# Patient Record
Sex: FEMALE | Race: BLACK OR AFRICAN AMERICAN | Employment: STUDENT | URBAN - NONMETROPOLITAN AREA
[De-identification: names, ages, dates, MRNs, and addresses within clinical notes are randomized per-mention and may not be internally consistent; named-entity substitution may affect disease eponyms.]

---
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
## 2021-01-15 ENCOUNTER — OFFICE VISIT (OUTPATIENT)
Dept: URGENT CARE | Age: 21
End: 2021-01-15
Payer: COMMERCIAL

## 2021-01-15 VITALS
BODY MASS INDEX: 23.97 KG/M2 | OXYGEN SATURATION: 100 % | TEMPERATURE: 99.4 F | RESPIRATION RATE: 20 BRPM | SYSTOLIC BLOOD PRESSURE: 126 MMHG | DIASTOLIC BLOOD PRESSURE: 82 MMHG | WEIGHT: 114.2 LBS | HEART RATE: 75 BPM | HEIGHT: 58 IN

## 2021-01-15 DIAGNOSIS — B37.31 VULVOVAGINAL CANDIDIASIS: Primary | ICD-10-CM

## 2021-01-15 DIAGNOSIS — Z11.3 SCREENING FOR STDS (SEXUALLY TRANSMITTED DISEASES): ICD-10-CM

## 2021-01-15 PROCEDURE — G8482 FLU IMMUNIZE ORDER/ADMIN: HCPCS | Performed by: NURSE PRACTITIONER

## 2021-01-15 PROCEDURE — G8420 CALC BMI NORM PARAMETERS: HCPCS | Performed by: NURSE PRACTITIONER

## 2021-01-15 PROCEDURE — 99213 OFFICE O/P EST LOW 20 MIN: CPT | Performed by: NURSE PRACTITIONER

## 2021-01-15 PROCEDURE — 1036F TOBACCO NON-USER: CPT | Performed by: NURSE PRACTITIONER

## 2021-01-15 PROCEDURE — G8427 DOCREV CUR MEDS BY ELIG CLIN: HCPCS | Performed by: NURSE PRACTITIONER

## 2021-01-15 RX ORDER — FLUCONAZOLE 100 MG/1
200 TABLET ORAL ONCE
Qty: 2 TABLET | Refills: 0 | Status: SHIPPED | OUTPATIENT
Start: 2021-01-15 | End: 2021-01-15

## 2021-01-15 ASSESSMENT — ENCOUNTER SYMPTOMS: ABDOMINAL PAIN: 0

## 2021-01-15 NOTE — PROGRESS NOTES
[facility]  [address] [address] [place] [address]  Dept: [phone]  Dept Fax: [phone]  Loc: [phone]    Kerry Wright is a 24 y.o. female who presents today for her medical conditions/complaintsas noted below. Kerry Wright is c/o of Vaginal Itching        HPI:     Vaginal Itching  The patient's primary symptoms include genital itching and vaginal discharge. The patient's pertinent negatives include no genital lesions, genital odor or pelvic pain. This is a new problem. The current episode started in the past 7 days. The problem occurs daily. The problem has been gradually worsening. The patient is experiencing no pain. Pertinent negatives include no abdominal pain, chills, dysuria, fever, frequency or urgency. The vaginal discharge was yellow, white and thick. Nothing aggravates the symptoms. She has tried nothing for the symptoms. She is sexually active. It is unknown whether or not her partner has an STD. Her past medical history is significant for vaginosis. History reviewed. No pertinent past medical history. Past Surgical History:   Procedure Laterality Date    EXTERNAL EAR SURGERY Left        Family History   Problem Relation Age of Onset    Other Mother         Irregular menses    Diabetes Maternal Grandmother     Cancer Maternal Grandfather        Social History     Tobacco Use    Smoking status: Never Smoker    Smokeless tobacco: Never Used   Substance Use Topics    Alcohol use: No     Alcohol/week: 0.0 standard drinks      Current Outpatient Medications   Medication Sig Dispense Refill    fluconazole (DIFLUCAN) 100 MG tablet Take 2 tablets by mouth once for 1 dose 2 tablet 0    norgestimate-ethinyl estradiol (ESTARYLLA) 0.25-35 MG-MCG per tablet TAKE 1 TABLET BY MOUTH EVERY DAY 28 tablet 11     No current facility-administered medications for this visit.       No Known Allergies    Health Maintenance   Topic Date Due    Hepatitis C screen  2001    Chlamydia screen  08/22/2019    DTaP/Tdap/Td vaccine (5 - Td) 02/26/2029    Hepatitis A vaccine  Completed    Hepatitis B vaccine  Completed    Hib vaccine  Completed    HPV vaccine  Completed    Varicella vaccine  Completed    Meningococcal (ACWY) vaccine  Completed    Flu vaccine  Completed    HIV screen  Completed    Pneumococcal 0-64 years Vaccine  Aged Out       Subjective:     Review of Systems   Constitutional: Negative for chills and fever. Gastrointestinal: Negative for abdominal pain. Genitourinary: Positive for vaginal discharge. Negative for dysuria, frequency, genital sores, pelvic pain, urgency, vaginal bleeding and vaginal pain. Psychiatric/Behavioral: Negative for behavioral problems. Objective:     Physical Exam  Vitals signs and nursing note reviewed. Constitutional:       General: She is not in acute distress. Appearance: Normal appearance. She is not ill-appearing or toxic-appearing. HENT:      Head: Normocephalic and atraumatic. Right Ear: External ear normal.      Left Ear: External ear normal.   Eyes:      Extraocular Movements: Extraocular movements intact. Conjunctiva/sclera: Conjunctivae normal.      Pupils: Pupils are equal, round, and reactive to light. Cardiovascular:      Rate and Rhythm: Normal rate. Pulmonary:      Effort: Pulmonary effort is normal.   Genitourinary:     Comments: deferred  Musculoskeletal:         General: No swelling or signs of injury. Skin:     General: Skin is warm and dry. Findings: No rash. Neurological:      General: No focal deficit present. Mental Status: She is alert and oriented to person, place, and time. Motor: No weakness.    Psychiatric:         Mood and Affect: Mood normal.       /82   Pulse 75   Temp 99.4 °F (37.4 °C)   Resp 20   Ht (!) 4' 10\" (1.473 m)   Wt 114 lb 3.2 oz (51.8 kg)   LMP 01/02/2021   SpO2 100%   BMI 23.87 kg/m² Assessment:       Diagnosis Orders   1. Vulvovaginal candidiasis  fluconazole (DIFLUCAN) 100 MG tablet   2. Screening for STDs (sexually transmitted diseases)         Plan:    No orders of the defined types were placed in this encounter. Symptoms consistent with vulvovaginal candida will treat with Diflucan. Because patient has also had unprotected sex with partner will do [facility] urine panel. She VU. No follow-ups on file. Orders Placed This Encounter   Medications    fluconazole (DIFLUCAN) 100 MG tablet     Sig: Take 2 tablets by mouth once for 1 dose     Dispense:  2 tablet     Refill:  0       Patient given educational materials- see patient instructions. Discussed use, benefit, and side effects of prescribedmedications. All patient questions answered. Pt voiced understanding. Reviewedhealth maintenance. Instructed to continue current medications, diet and exercise. Patient agreed with treatment plan. Follow up as directed. Patient Instructions   Take Diflucan as prescribed. Urine sent out for panel. Will call with results. Refrain from sexual activity until results are discussed with you.         Electronically signed by BETH Moore CNP on 1/15/2021 at 4:27 PM

## 2021-01-15 NOTE — PATIENT INSTRUCTIONS
Take Diflucan as prescribed. Urine sent out for panel. Will call with results. Refrain from sexual activity until results are discussed with you.

## 2021-01-17 ENCOUNTER — TELEPHONE (OUTPATIENT)
Dept: URGENT CARE | Age: 21
End: 2021-01-17

## 2021-01-17 RX ORDER — METRONIDAZOLE 500 MG/1
500 TABLET ORAL 2 TIMES DAILY
Qty: 14 TABLET | Refills: 0 | Status: SHIPPED | OUTPATIENT
Start: 2021-01-17 | End: 2021-01-24

## 2021-01-17 NOTE — TELEPHONE ENCOUNTER
Please call pt and let her know she has BV and a yeast infection. She has been treated for the yeast. Script for BV tx sent to pharmacy.

## 2021-05-18 ENCOUNTER — OFFICE VISIT (OUTPATIENT)
Dept: URGENT CARE | Age: 21
End: 2021-05-18
Payer: COMMERCIAL

## 2021-05-18 VITALS
SYSTOLIC BLOOD PRESSURE: 138 MMHG | BODY MASS INDEX: 24.86 KG/M2 | WEIGHT: 118.4 LBS | RESPIRATION RATE: 18 BRPM | OXYGEN SATURATION: 100 % | TEMPERATURE: 98.1 F | DIASTOLIC BLOOD PRESSURE: 78 MMHG | HEIGHT: 58 IN | HEART RATE: 74 BPM

## 2021-05-18 DIAGNOSIS — R10.32 BILATERAL LOWER ABDOMINAL CRAMPING: ICD-10-CM

## 2021-05-18 DIAGNOSIS — R10.31 BILATERAL LOWER ABDOMINAL CRAMPING: ICD-10-CM

## 2021-05-18 DIAGNOSIS — Z11.3 SCREEN FOR STD (SEXUALLY TRANSMITTED DISEASE): Primary | ICD-10-CM

## 2021-05-18 LAB
BILIRUBIN, POC: NORMAL
BLOOD URINE, POC: NORMAL
CLARITY, POC: CLEAR
COLOR, POC: YELLOW
GLUCOSE URINE, POC: NORMAL
KETONES, POC: NORMAL
LEUKOCYTE EST, POC: NORMAL
NITRITE, POC: NORMAL
PH, POC: 7
PROTEIN, POC: NORMAL
SPECIFIC GRAVITY, POC: >=1030
UROBILINOGEN, POC: NORMAL

## 2021-05-18 PROCEDURE — 99213 OFFICE O/P EST LOW 20 MIN: CPT | Performed by: NURSE PRACTITIONER

## 2021-05-18 PROCEDURE — 1036F TOBACCO NON-USER: CPT | Performed by: NURSE PRACTITIONER

## 2021-05-18 PROCEDURE — G8427 DOCREV CUR MEDS BY ELIG CLIN: HCPCS | Performed by: NURSE PRACTITIONER

## 2021-05-18 PROCEDURE — 81002 URINALYSIS NONAUTO W/O SCOPE: CPT | Performed by: NURSE PRACTITIONER

## 2021-05-18 PROCEDURE — G8420 CALC BMI NORM PARAMETERS: HCPCS | Performed by: NURSE PRACTITIONER

## 2021-05-18 ASSESSMENT — VISUAL ACUITY: OU: 1

## 2021-05-18 ASSESSMENT — ENCOUNTER SYMPTOMS
DIARRHEA: 0
COUGH: 0
BACK PAIN: 0
NAUSEA: 0
ABDOMINAL PAIN: 1
VOMITING: 0

## 2021-05-18 NOTE — PATIENT INSTRUCTIONS
[facility] swab sent for STD, yeast- we will call with results in 2 days  Follow up with PCP or return to Urgent Care for worsening or unresolved symptoms. Patient Education        Exposure to Sexually Transmitted Infections: Care Instructions  Your Care Instructions  Sexually transmitted infections (STIs) are those diseases spread by sexual contact. There are at least 20 different STIs, including chlamydia, gonorrhea, syphilis, and human immunodeficiency virus (HIV), which causes AIDS. Bacteria-caused STIs can be treated and cured. STIs caused by viruses, such as HIV, can be treated but not cured. Some STIs can reduce a woman's chances of getting pregnant in the future. STIs are spread during sexual contact, such as vaginal intercourse and oral or anal sex. Follow-up care is a key part of your treatment and safety. Be sure to make and go to all appointments, and call your doctor if you are having problems. It's also a good idea to know your test results and keep a list of the medicines you take. How can you care for yourself at home? · Take medicines exactly as prescribed. · If your doctor prescribed antibiotics, take them as directed. Do not stop taking them just because you feel better. You need to take the full course of antibiotics. · Tell your sex partner (or partners) that they will need treatment. · If you are a woman, do not douche. Douching changes the normal balance of bacteria in the vagina. It may also spread an infection up into your reproductive organs. How can you prevent sexually transmitted infections (STIs)? You can help prevent STIs if you wait to have sex with a new partner (or partners) until you've each been tested for STIs. It also helps if you use condoms (male or female condoms) and if you limit your sex partners to one person who only has sex with you. When should you call for help?    Call your doctor now or seek immediate medical care if:    · You have new pain in your belly or pelvis.     · You have symptoms of a urinary tract infection. These may include:  ? Pain or burning when you urinate. ? A frequent need to urinate without being able to pass much urine. ? Pain in the flank, which is just below the rib cage and above the waist on either side of the back. ? Blood in your urine. ? A fever.     · You have new or worsening pain or swelling in the scrotum. Watch closely for changes in your health, and be sure to contact your doctor if:    · You have unusual vaginal bleeding.     · You have a discharge from the vagina or penis.     · You have any new symptoms, such as sores, bumps, rashes, blisters, or warts.     · You have itching, tingling, pain, or burning in the genital or anal area.     · You think you may have an STI. Where can you learn more? Go to [email or web address] and sign in to your Meijob account. Enter V815 in the Stratio box to learn more about \"Exposure to Sexually Transmitted Infections: Care Instructions. \"     If you do not have an account, please click on the \"Sign Up Now\" link. Current as of: February 26, 2020               Content Version: 12.8  © 2006-2021 Healthwise, Incorporated. Care instructions adapted under license by [facility] ([facility]). If you have questions about a medical condition or this instruction, always ask your healthcare professional. [address] [place] [address] any warranty or liability for your use of this information.

## 2021-05-18 NOTE — PROGRESS NOTES
[address] [facility]  [address] [address] [place] [address]  Dept: [phone]  Dept Fax: [phone]  Loc: [phone]    Oskar Ramos is a 24 y.o. female who presents today for her medical conditions/complaintsas noted below. Oskar Ramos is c/o of Other (patient states i feel fine today but the other day i had a weird cramp in my stomach and i nned to make sure i am okay. Patient states I have been sexually active but have not had any discharge or vaginal odor.)        HPI:     Other  Associated symptoms include abdominal pain. Pertinent negatives include no chills, coughing, fever, nausea, rash or vomiting. Marisela Freitas is here with complaint of lower abd cramping a couple of days ago but feels fine now. She has been sexually active and wants to be checked for STD. She denies pain, vaginal irritation or vaginal discharge. She has had no fever,chiils or back pain noted. She had yeast, BV and ureaplasma in January of this year and was treated in our office. History reviewed. No pertinent past medical history. Past Surgical History:   Procedure Laterality Date    EXTERNAL EAR SURGERY Left        Family History   Problem Relation Age of Onset    Other Mother         Irregular menses    Diabetes Maternal Grandmother     Cancer Maternal Grandfather        Social History     Tobacco Use    Smoking status: Never Smoker    Smokeless tobacco: Never Used   Substance Use Topics    Alcohol use: No     Alcohol/week: 0.0 standard drinks      Current Outpatient Medications   Medication Sig Dispense Refill    norgestimate-ethinyl estradiol (ESTARYLLA) 0.25-35 MG-MCG per tablet TAKE 1 TABLET BY MOUTH EVERY DAY 28 tablet 11     No current facility-administered medications for this visit.      No Known Allergies    Health Maintenance   Topic Date Due    Hepatitis C screen  Never done    COVID-19 Vaccine (1) Never done    Chlamydia screen  08/22/2019    DTaP/Tdap/Td vaccine (5 - Td) 02/26/2029    Hepatitis A vaccine  Completed    Hepatitis B vaccine  Completed    Hib vaccine  Completed    HPV vaccine  Completed    Varicella vaccine  Completed    Meningococcal (ACWY) vaccine  Completed    Flu vaccine  Completed    HIV screen  Completed    Pneumococcal 0-64 years Vaccine  Aged Out       Subjective:     Review of Systems   Constitutional: Negative for activity change, appetite change, chills and fever. Respiratory: Negative for cough. Cardiovascular: Negative. Gastrointestinal: Positive for abdominal pain. Negative for diarrhea, nausea and vomiting. Genitourinary: Negative for dysuria, pelvic pain, vaginal bleeding, vaginal discharge and vaginal pain. Musculoskeletal: Negative for back pain. Skin: Negative for rash. Neurological: Negative.        :Objective      Physical Exam  Vitals and nursing note reviewed. Constitutional:       General: She is awake. She is not in acute distress. Appearance: Normal appearance. She is well-developed and well-groomed. She is not ill-appearing. HENT:      Head: Normocephalic. Right Ear: Hearing normal.      Left Ear: Hearing normal.      Nose: Nose normal.      Mouth/Throat:      Lips: Pink. Eyes:      General: Vision grossly intact. Neck:      Trachea: Phonation normal.   Cardiovascular:      Rate and Rhythm: Normal rate and regular rhythm. Heart sounds: Normal heart sounds, S1 normal and S2 normal. No murmur heard. No friction rub. No gallop. Pulmonary:      Effort: Pulmonary effort is normal. No respiratory distress. Breath sounds: Normal breath sounds and air entry. No wheezing, rhonchi or rales. Abdominal:      General: Abdomen is flat. Bowel sounds are normal.      Palpations: Abdomen is soft. Tenderness: There is no abdominal tenderness. There is no right CVA tenderness or left CVA tenderness. Musculoskeletal:         General: No tenderness or deformity.  Normal range of motion. Cervical back: Neck supple. Skin:     General: Skin is warm and dry. Capillary Refill: Capillary refill takes less than 2 seconds. Neurological:      General: No focal deficit present. Mental Status: She is alert, oriented to person, place, and time and easily aroused. Psychiatric:         Attention and Perception: Attention normal.         Mood and Affect: Mood normal.         Speech: Speech normal.         Behavior: Behavior normal. Behavior is cooperative. /78   Pulse 74   Temp 98.1 °F (36.7 °C)   Resp 18   Ht (!) 4' 10\" (1.473 m)   Wt 118 lb 6.4 oz (53.7 kg)   LMP 04/30/2021   SpO2 100%   BMI 24.75 kg/m²     :Assessment       Diagnosis Orders   1. Screen for STD (sexually transmitted disease)     2. Bilateral lower abdominal cramping  POCT Urinalysis no Micro       :Plan      Orders Placed This Encounter   Procedures    POCT Urinalysis no Micro     Results for orders placed or performed in visit on 05/18/21   POCT Urinalysis no Micro   Result Value Ref Range    Color, UA yellow     Clarity, UA clear     Glucose, UA POC neg     Bilirubin, UA neg     Ketones, UA neg     Spec Grav, UA >=1,030     Blood, UA POC neg     pH, UA 7.0     Protein, UA POC neg     Urobilinogen, UA 0.2 E.U./dl     Leukocytes, UA neg     Nitrite, UA neg        No follow-ups on file. No orders of the defined types were placed in this encounter. Patient Instructions     [facility] swab sent for STD, yeast- we will call with results in 2 days  Follow up with PCP or return to Urgent Care for worsening or unresolved symptoms. Patient Education        Exposure to Sexually Transmitted Infections: Care Instructions  Your Care Instructions  Sexually transmitted infections (STIs) are those diseases spread by sexual contact. There are at least 20 different STIs, including chlamydia, gonorrhea, syphilis, and human immunodeficiency virus (HIV), which causes AIDS.  Bacteria-caused STIs can be · You have a discharge from the vagina or penis.     · You have any new symptoms, such as sores, bumps, rashes, blisters, or warts.     · You have itching, tingling, pain, or burning in the genital or anal area.     · You think you may have an STI. Where can you learn more? Go to [email or web address] and sign in to your DigiMeld account. Enter F195 in the GridX box to learn more about \"Exposure to Sexually Transmitted Infections: Care Instructions. \"     If you do not have an account, please click on the \"Sign Up Now\" link. Current as of: February 26, 2020               Content Version: 12.8  © 2006-2021 Healthwise, [email or web address]. Care instructions adapted under license by [facility] ([facility]). If you have questions about a medical condition or this instruction, always ask your healthcare professional. [address] [place] [address] any warranty or liability for your use of this information. Patient given educational materials- see patient instructions. Discussed use, benefit, and side effects of prescribedmedications. All patient questions answered. Pt voiced understanding.        Electronically signed by BETH uBi CNP on 5/18/2021 at 11:40 AM

## 2021-05-19 ENCOUNTER — TELEPHONE (OUTPATIENT)
Dept: URGENT CARE | Age: 21
End: 2021-05-19

## 2021-05-19 DIAGNOSIS — B96.89 BACTERIAL VAGINOSIS: Primary | ICD-10-CM

## 2021-05-19 DIAGNOSIS — N76.0 BACTERIAL VAGINOSIS: Primary | ICD-10-CM

## 2021-05-19 RX ORDER — METRONIDAZOLE 500 MG/1
500 TABLET ORAL 2 TIMES DAILY
Qty: 14 TABLET | Refills: 0 | Status: SHIPPED | OUTPATIENT
Start: 2021-05-19 | End: 2021-05-26

## 2021-05-19 RX ORDER — DOXYCYCLINE HYCLATE 100 MG
100 TABLET ORAL 2 TIMES DAILY
Qty: 20 TABLET | Refills: 0 | Status: SHIPPED | OUTPATIENT
Start: 2021-05-19 | End: 2021-05-29

## 2021-05-19 NOTE — TELEPHONE ENCOUNTER
Please call and let patient know that swab came back positive for bacterial vaginosis. Doxy and Flagyl sent to [place]. Take both medicines until completed; no sexual activity until meds complete.

## 2021-05-28 ENCOUNTER — OFFICE VISIT (OUTPATIENT)
Dept: OBGYN CLINIC | Age: 21
End: 2021-05-28
Payer: COMMERCIAL

## 2021-05-28 VITALS
SYSTOLIC BLOOD PRESSURE: 119 MMHG | BODY MASS INDEX: 23.51 KG/M2 | DIASTOLIC BLOOD PRESSURE: 81 MMHG | HEART RATE: 90 BPM | HEIGHT: 58 IN | WEIGHT: 112 LBS

## 2021-05-28 DIAGNOSIS — R10.2 PELVIC PAIN IN FEMALE: ICD-10-CM

## 2021-05-28 DIAGNOSIS — N76.0 BV (BACTERIAL VAGINOSIS): Primary | ICD-10-CM

## 2021-05-28 DIAGNOSIS — B96.89 BV (BACTERIAL VAGINOSIS): Primary | ICD-10-CM

## 2021-05-28 PROCEDURE — 1036F TOBACCO NON-USER: CPT | Performed by: OBSTETRICS & GYNECOLOGY

## 2021-05-28 PROCEDURE — 99202 OFFICE O/P NEW SF 15 MIN: CPT | Performed by: OBSTETRICS & GYNECOLOGY

## 2021-05-28 PROCEDURE — G8420 CALC BMI NORM PARAMETERS: HCPCS | Performed by: OBSTETRICS & GYNECOLOGY

## 2021-05-28 PROCEDURE — G8427 DOCREV CUR MEDS BY ELIG CLIN: HCPCS | Performed by: OBSTETRICS & GYNECOLOGY

## 2021-05-28 ASSESSMENT — ENCOUNTER SYMPTOMS
RESPIRATORY NEGATIVE: 1
GASTROINTESTINAL NEGATIVE: 1
EYES NEGATIVE: 1

## 2021-05-28 NOTE — PROGRESS NOTES
Pt is here today to establish care. She had a previous diagnosis of BV and is currently taking medication for it. She states it has gotten better. She also says, when she has intercourse she always gets BV, she isn't sure why this always keeps occurring.

## 2021-05-28 NOTE — PROGRESS NOTES
Jaguar Doyle is a 24 y.o. new patient who presents today for problems with BV. States she has been treated and it gets better, but as soon as she has intercourse, her symptoms reoccur. Pt has some pelvic pain with the infection. Pt is currently taking Doxycycline and boric acid suppositories. Pt wishes to discuss how to avoid this. Review of Systems   Constitutional: Negative. HENT: Negative. Eyes: Negative. Respiratory: Negative. Cardiovascular: Negative. Gastrointestinal: Negative. Endocrine: Negative. Genitourinary: Positive for pelvic pain. Negative for dysuria, frequency, menstrual problem, urgency and vaginal discharge. Musculoskeletal: Negative. Skin: Negative. Allergic/Immunologic: Negative. Neurological: Negative. Hematological: Negative. Psychiatric/Behavioral: Negative. History reviewed. No pertinent past medical history.     Past Surgical History:   Procedure Laterality Date    EXTERNAL EAR SURGERY Left        Family History   Problem Relation Age of Onset    Other Mother         Irregular menses    Diabetes Maternal Grandmother     Cancer Maternal Grandfather        Social History     Socioeconomic History    Marital status: Single     Spouse name: Not on file    Number of children: Not on file    Years of education: Not on file    Highest education level: Not on file   Occupational History    Not on file   Tobacco Use    Smoking status: Never Smoker    Smokeless tobacco: Never Used   Vaping Use    Vaping Use: Never used   Substance and Sexual Activity    Alcohol use: No     Alcohol/week: 0.0 standard drinks    Drug use: No    Sexual activity: Yes     Partners: Male   Other Topics Concern    Not on file   Social History Narrative    Not on file     Social Determinants of Health     Financial Resource Strain:     Difficulty of Paying Living Expenses:    Food Insecurity:     Worried About Running Out of Food in the Last Year:     Ran Out of Food in the Last Year:    Transportation Needs:     Lack of Transportation (Medical):  Lack of Transportation (Non-Medical):    Physical Activity:     Days of Exercise per Week:     Minutes of Exercise per Session:    Stress:     Feeling of Stress :    Social Connections:     Frequency of Communication with Friends and Family:     Frequency of Social Gatherings with Friends and Family:     Attends [employer]ist Services:     Active Member of Clubs or Organizations:     Attends Club or Organization Meetings:     Marital Status:    Intimate Partner Violence:     Fear of Current or Ex-Partner:     Emotionally Abused:     Physically Abused:     Sexually Abused:          Current Outpatient Medications:     norgestimate-ethinyl estradiol (ESTARYLLA) 0.25-35 MG-MCG per tablet, TAKE 1 TABLET BY MOUTH EVERY DAY, Disp: 28 tablet, Rfl: 11    No Known Allergies    /81   Pulse 90   Ht (!) 4' 10\" (1.473 m)   Wt 112 lb (50.8 kg)   LMP 04/22/2021   BMI 23.41 kg/m²   Physical Exam  Constitutional:       General: She is not in acute distress. Appearance: She is well-developed. She is not diaphoretic. HENT:      Head: Normocephalic and atraumatic. Hair is normal.      Right Ear: Hearing and external ear normal. No decreased hearing noted. Left Ear: Hearing and external ear normal. No decreased hearing noted. Nose: Nose normal. No rhinorrhea. Mouth/Throat:      Dentition: Normal dentition. Eyes:      General:         Right eye: No discharge. Left eye: No discharge. Conjunctiva/sclera: Conjunctivae normal.      Pupils: Pupils are equal, round, and reactive to light. Pulmonary:      Effort: Pulmonary effort is normal. No respiratory distress. Musculoskeletal:         General: No deformity. Normal range of motion. Cervical back: Normal range of motion. Skin:     General: Skin is warm and dry. Coloration: Skin is not pale. Findings: No rash.

## 2021-06-25 ASSESSMENT — ENCOUNTER SYMPTOMS: ALLERGIC/IMMUNOLOGIC NEGATIVE: 1

## 2021-07-22 ENCOUNTER — OFFICE VISIT (OUTPATIENT)
Dept: URGENT CARE | Age: 21
End: 2021-07-22
Payer: COMMERCIAL

## 2021-07-22 VITALS
HEART RATE: 80 BPM | TEMPERATURE: 97.4 F | RESPIRATION RATE: 16 BRPM | WEIGHT: 114 LBS | DIASTOLIC BLOOD PRESSURE: 74 MMHG | HEIGHT: 58 IN | OXYGEN SATURATION: 100 % | BODY MASS INDEX: 23.93 KG/M2 | SYSTOLIC BLOOD PRESSURE: 125 MMHG

## 2021-07-22 DIAGNOSIS — R82.90 ABNORMAL URINALYSIS: ICD-10-CM

## 2021-07-22 DIAGNOSIS — Z87.440 HISTORY OF UTI: ICD-10-CM

## 2021-07-22 DIAGNOSIS — R30.0 DYSURIA: Primary | ICD-10-CM

## 2021-07-22 LAB
APPEARANCE FLUID: ABNORMAL
BILIRUBIN, POC: ABNORMAL
BLOOD URINE, POC: ABNORMAL
CLARITY, POC: ABNORMAL
COLOR, POC: ABNORMAL
GLUCOSE URINE, POC: ABNORMAL
KETONES, POC: ABNORMAL
LEUKOCYTE EST, POC: ABNORMAL
NITRITE, POC: ABNORMAL
PH, POC: 6
PROTEIN, POC: ABNORMAL
SPECIFIC GRAVITY, POC: >=1.03
UROBILINOGEN, POC: 0.2

## 2021-07-22 PROCEDURE — G8427 DOCREV CUR MEDS BY ELIG CLIN: HCPCS | Performed by: NURSE PRACTITIONER

## 2021-07-22 PROCEDURE — 1036F TOBACCO NON-USER: CPT | Performed by: NURSE PRACTITIONER

## 2021-07-22 PROCEDURE — 99214 OFFICE O/P EST MOD 30 MIN: CPT | Performed by: NURSE PRACTITIONER

## 2021-07-22 PROCEDURE — 81002 URINALYSIS NONAUTO W/O SCOPE: CPT | Performed by: NURSE PRACTITIONER

## 2021-07-22 PROCEDURE — G8420 CALC BMI NORM PARAMETERS: HCPCS | Performed by: NURSE PRACTITIONER

## 2021-07-22 RX ORDER — NITROFURANTOIN 25; 75 MG/1; MG/1
100 CAPSULE ORAL 2 TIMES DAILY
Qty: 14 CAPSULE | Refills: 0 | Status: SHIPPED | OUTPATIENT
Start: 2021-07-22 | End: 2021-07-29

## 2021-07-22 ASSESSMENT — ENCOUNTER SYMPTOMS: NAUSEA: 1

## 2021-07-22 NOTE — LETTER
[id number] [facility]  [address] [place] [address] [address] [id number]  Phone: [phone]  Fax: BETH Mary CNP        July 22, 2021     Patient: Tashi Cannon   YOB: 2000   Date of Visit: 7/22/2021       To Whom it May Concern:    Tashi Cannon was seen in my clinic on 7/22/2021. She may return to work on 7/23/2021. If you have any questions or concerns, please don't hesitate to call.     Sincerely,         BETH Stuart CNP

## 2021-07-22 NOTE — PROGRESS NOTES
[facility]  [address] [address] [place] [address]  Dept: [phone]  Dept Fax: [phone]  Loc: [phone]    Cm Rodriguez is a 24 y.o. female who presents today for her medical conditions/complaintsas noted below. Cm Rodriguez is c/o of Dysuria (onset 2 days ago, reports hx of BV ), Vaginal Pain, Nausea, and Sexually Transmitted Diseases (wants to be tested for STDs )        HPI:     Dysuria   This is a new problem. The current episode started in the past 7 days. The problem has been unchanged. The quality of the pain is described as burning. The pain is mild. There has been no fever. She is sexually active. Associated symptoms include a discharge and nausea. Pertinent negatives include no chills, flank pain, frequency or urgency. She has tried nothing for the symptoms. PMH UTI's and recurrent BV infections       No past medical history on file. Past Surgical History:   Procedure Laterality Date    EXTERNAL EAR SURGERY Left        Family History   Problem Relation Age of Onset    Other Mother         Irregular menses    Diabetes Maternal Grandmother     Cancer Maternal Grandfather        Social History     Tobacco Use    Smoking status: Never Smoker    Smokeless tobacco: Never Used   Substance Use Topics    Alcohol use: No     Alcohol/week: 0.0 standard drinks      Current Outpatient Medications   Medication Sig Dispense Refill    nitrofurantoin, macrocrystal-monohydrate, (MACROBID) 100 MG capsule Take 1 capsule by mouth 2 times daily for 7 days 14 capsule 0    norgestimate-ethinyl estradiol (ESTARYLLA) 0.25-35 MG-MCG per tablet TAKE 1 TABLET BY MOUTH EVERY DAY 28 tablet 11     No current facility-administered medications for this visit.      No Known Allergies    Health Maintenance   Topic Date Due    Hepatitis C screen  Never done    COVID-19 Vaccine (1) Never done    Chlamydia screen  08/22/2019    Flu vaccine (1) 09/01/2021    DTaP/Tdap/Td vaccine (5 - Td or Tdap) 02/26/2029    Hepatitis A vaccine  Completed    Hepatitis B vaccine  Completed    Hib vaccine  Completed    HPV vaccine  Completed    Varicella vaccine  Completed    Meningococcal (ACWY) vaccine  Completed    HIV screen  Completed    Pneumococcal 0-64 years Vaccine  Aged Out       Subjective:     Review of Systems   Constitutional: Negative for chills, fatigue and fever. Gastrointestinal: Positive for nausea. Genitourinary: Positive for dysuria and vaginal discharge. Negative for difficulty urinating, flank pain, frequency, urgency and vaginal bleeding. Objective:     Physical Exam  Vitals and nursing note reviewed. Constitutional:       General: She is not in acute distress. Appearance: Normal appearance. She is not ill-appearing or toxic-appearing. HENT:      Head: Normocephalic and atraumatic. Right Ear: External ear normal.      Left Ear: External ear normal.   Eyes:      Extraocular Movements: Extraocular movements intact. Conjunctiva/sclera: Conjunctivae normal.      Pupils: Pupils are equal, round, and reactive to light. Cardiovascular:      Rate and Rhythm: Normal rate. Pulmonary:      Effort: Pulmonary effort is normal.   Abdominal:      Tenderness: There is no right CVA tenderness or left CVA tenderness. Genitourinary:     Comments: deferred  Musculoskeletal:         General: No swelling or signs of injury. Skin:     General: Skin is warm and dry. Findings: No rash. Neurological:      General: No focal deficit present. Mental Status: She is alert and oriented to person, place, and time. Motor: No weakness. Psychiatric:         Mood and Affect: Mood normal.       /74   Pulse 80   Temp 97.4 °F (36.3 °C) (Temporal)   Resp 16   Ht 4' 10\" (1.473 m)   Wt 114 lb (51.7 kg)   SpO2 100%   BMI 23.83 kg/m²     Assessment:       Diagnosis Orders   1. Dysuria  POCT Urinalysis no Micro   2.  Abnormal urinalysis return as needed.           Electronically signed by BETH Rosenthal CNP on 7/22/2021 at 12:01 PM

## 2021-07-22 NOTE — PATIENT INSTRUCTIONS
1. Take full course of antibiotics. 2. Increase water. 3. Avoid baths or hot tubs. 4. We will call with urine culture results and urine BV results. 5. If patient is not improving or developing any new/worsening symptoms then return as needed.

## 2021-07-23 ENCOUNTER — TELEPHONE (OUTPATIENT)
Dept: URGENT CARE | Age: 21
End: 2021-07-23

## 2021-07-23 DIAGNOSIS — B96.89 BV (BACTERIAL VAGINOSIS): Primary | ICD-10-CM

## 2021-07-23 DIAGNOSIS — N76.0 BV (BACTERIAL VAGINOSIS): Primary | ICD-10-CM

## 2021-07-23 RX ORDER — DOXYCYCLINE 100 MG/1
100 TABLET ORAL 2 TIMES DAILY
Qty: 20 TABLET | Refills: 0 | Status: SHIPPED | OUTPATIENT
Start: 2021-07-23 | End: 2021-07-26

## 2021-07-23 RX ORDER — METRONIDAZOLE 500 MG/1
500 TABLET ORAL 2 TIMES DAILY
Qty: 14 TABLET | Refills: 0 | Status: SHIPPED | OUTPATIENT
Start: 2021-07-23 | End: 2021-07-30

## 2021-07-23 NOTE — TELEPHONE ENCOUNTER
Please call pt and let her know that her diatherix swab is back and it was positive for three types of BV bacteria (atopobium vaginae, gardnerella vaginalis, and mycoplasma hominis). She will need two antibiotics to treat this. One is doxycyline and the other is flagyl. She will need to avoid alcohol while taking the flagyl. Doxycyline can cause a bad skin reaction while she is taking it if she is out in prolonged sunlight. I advise that she follow up with PCP/GYN.

## 2021-07-24 LAB
ORGANISM: ABNORMAL
URINE CULTURE, ROUTINE: ABNORMAL
URINE CULTURE, ROUTINE: ABNORMAL

## 2021-07-24 NOTE — TELEPHONE ENCOUNTER
Called pt,verified her . Informed pt of result, recommendations and medications for treatment. Pt voiced understanding.

## 2021-07-26 ENCOUNTER — APPOINTMENT (OUTPATIENT)
Dept: GENERAL RADIOLOGY | Age: 21
End: 2021-07-26
Payer: COMMERCIAL

## 2021-07-26 ENCOUNTER — TELEPHONE (OUTPATIENT)
Dept: PEDIATRICS | Age: 21
End: 2021-07-26

## 2021-07-26 ENCOUNTER — HOSPITAL ENCOUNTER (EMERGENCY)
Age: 21
Discharge: HOME | End: 2021-07-26
Payer: COMMERCIAL

## 2021-07-26 ENCOUNTER — APPOINTMENT (OUTPATIENT)
Dept: CT IMAGING | Age: 21
End: 2021-07-26
Payer: COMMERCIAL

## 2021-07-26 VITALS
OXYGEN SATURATION: 94 % | TEMPERATURE: 100.1 F | SYSTOLIC BLOOD PRESSURE: 127 MMHG | BODY MASS INDEX: 23.93 KG/M2 | RESPIRATION RATE: 20 BRPM | WEIGHT: 114 LBS | HEART RATE: 86 BPM | DIASTOLIC BLOOD PRESSURE: 95 MMHG | HEIGHT: 58 IN

## 2021-07-26 DIAGNOSIS — N30.00 ACUTE CYSTITIS WITHOUT HEMATURIA: ICD-10-CM

## 2021-07-26 DIAGNOSIS — R51.9 ACUTE NONINTRACTABLE HEADACHE, UNSPECIFIED HEADACHE TYPE: Primary | ICD-10-CM

## 2021-07-26 DIAGNOSIS — R11.2 NON-INTRACTABLE VOMITING WITH NAUSEA, UNSPECIFIED VOMITING TYPE: ICD-10-CM

## 2021-07-26 LAB
ADENOVIRUS BY PCR: NOT DETECTED
ALBUMIN SERPL-MCNC: 3.9 G/DL (ref 3.5–5.2)
ALP BLD-CCNC: 69 U/L (ref 35–104)
ALT SERPL-CCNC: 9 U/L (ref 5–33)
ANION GAP SERPL CALCULATED.3IONS-SCNC: 13 MMOL/L (ref 7–19)
AST SERPL-CCNC: 13 U/L (ref 5–32)
ATYPICAL LYMPHOCYTE RELATIVE PERCENT: 10 % (ref 0–8)
BACTERIA: ABNORMAL /HPF
BASOPHILS ABSOLUTE: 0 K/UL (ref 0–0.2)
BASOPHILS RELATIVE PERCENT: 0 % (ref 0–1)
BILIRUB SERPL-MCNC: <0.2 MG/DL (ref 0.2–1.2)
BILIRUBIN URINE: NEGATIVE
BLOOD, URINE: ABNORMAL
BORDETELLA PARAPERTUSSIS BY PCR: NOT DETECTED
BORDETELLA PERTUSSIS BY PCR: NOT DETECTED
BUN BLDV-MCNC: 8 MG/DL (ref 6–20)
CALCIUM SERPL-MCNC: 9.1 MG/DL (ref 8.6–10)
CHLAMYDOPHILIA PNEUMONIAE BY PCR: NOT DETECTED
CHLORIDE BLD-SCNC: 100 MMOL/L (ref 98–111)
CLARITY: ABNORMAL
CO2: 23 MMOL/L (ref 22–29)
COLOR: ABNORMAL
CORONAVIRUS 229E BY PCR: NOT DETECTED
CORONAVIRUS HKU1 BY PCR: NOT DETECTED
CORONAVIRUS NL63 BY PCR: NOT DETECTED
CORONAVIRUS OC43 BY PCR: NOT DETECTED
CREAT SERPL-MCNC: 0.7 MG/DL (ref 0.5–0.9)
CRYSTALS, UA: ABNORMAL /HPF
EOSINOPHILS ABSOLUTE: 0 K/UL (ref 0–0.6)
EOSINOPHILS RELATIVE PERCENT: 0 % (ref 0–5)
EPITHELIAL CELLS, UA: 2 /HPF (ref 0–5)
GFR AFRICAN AMERICAN: >59
GFR NON-AFRICAN AMERICAN: >60
GLUCOSE BLD-MCNC: 97 MG/DL (ref 74–109)
GLUCOSE URINE: NEGATIVE MG/DL
HCG QUALITATIVE: NEGATIVE
HCT VFR BLD CALC: 46.3 % (ref 37–47)
HEMOGLOBIN: 14.6 G/DL (ref 12–16)
HUMAN METAPNEUMOVIRUS BY PCR: NOT DETECTED
HUMAN RHINOVIRUS/ENTEROVIRUS BY PCR: NOT DETECTED
HYALINE CASTS: 39 /HPF (ref 0–8)
IMMATURE GRANULOCYTES #: 0 K/UL
INFLUENZA A BY PCR: NOT DETECTED
INFLUENZA B BY PCR: NOT DETECTED
KETONES, URINE: 15 MG/DL
LEUKOCYTE ESTERASE, URINE: ABNORMAL
LYMPHOCYTES ABSOLUTE: 2 K/UL (ref 1.1–4.5)
LYMPHOCYTES RELATIVE PERCENT: 10 % (ref 20–40)
MCH RBC QN AUTO: 27.5 PG (ref 27–31)
MCHC RBC AUTO-ENTMCNC: 31.5 G/DL (ref 33–37)
MCV RBC AUTO: 87.2 FL (ref 81–99)
MONOCYTES ABSOLUTE: 0.5 K/UL (ref 0–0.9)
MONOCYTES RELATIVE PERCENT: 5 % (ref 0–10)
MYCOPLASMA PNEUMONIAE BY PCR: NOT DETECTED
NEUTROPHILS ABSOLUTE: 7.4 K/UL (ref 1.5–7.5)
NEUTROPHILS RELATIVE PERCENT: 75 % (ref 50–65)
NITRITE, URINE: NEGATIVE
PARAINFLUENZA VIRUS 1 BY PCR: NOT DETECTED
PARAINFLUENZA VIRUS 2 BY PCR: NOT DETECTED
PARAINFLUENZA VIRUS 3 BY PCR: NOT DETECTED
PARAINFLUENZA VIRUS 4 BY PCR: NOT DETECTED
PDW BLD-RTO: 12.7 % (ref 11.5–14.5)
PH UA: 5.5 (ref 5–8)
PLATELET # BLD: 237 K/UL (ref 130–400)
PLATELET SLIDE REVIEW: ADEQUATE
PMV BLD AUTO: 10.1 FL (ref 9.4–12.3)
POTASSIUM REFLEX MAGNESIUM: 4 MMOL/L (ref 3.5–5)
PROTEIN UA: ABNORMAL MG/DL
RBC # BLD: 5.31 M/UL (ref 4.2–5.4)
RBC # BLD: NORMAL 10*6/UL
RBC UA: 4 /HPF (ref 0–4)
RESPIRATORY SYNCYTIAL VIRUS BY PCR: NOT DETECTED
SARS-COV-2, PCR: NOT DETECTED
SODIUM BLD-SCNC: 136 MMOL/L (ref 136–145)
SPECIFIC GRAVITY UA: 1.02 (ref 1–1.03)
TOTAL PROTEIN: 7.2 G/DL (ref 6.6–8.7)
UROBILINOGEN, URINE: 1 E.U./DL
WBC # BLD: 9.8 K/UL (ref 4.8–10.8)
WBC UA: 42 /HPF (ref 0–5)

## 2021-07-26 PROCEDURE — 80053 COMPREHEN METABOLIC PANEL: CPT

## 2021-07-26 PROCEDURE — 71045 X-RAY EXAM CHEST 1 VIEW: CPT

## 2021-07-26 PROCEDURE — 2580000003 HC RX 258: Performed by: NURSE PRACTITIONER

## 2021-07-26 PROCEDURE — 96375 TX/PRO/DX INJ NEW DRUG ADDON: CPT

## 2021-07-26 PROCEDURE — 6370000000 HC RX 637 (ALT 250 FOR IP): Performed by: NURSE PRACTITIONER

## 2021-07-26 PROCEDURE — 85025 COMPLETE CBC W/AUTO DIFF WBC: CPT

## 2021-07-26 PROCEDURE — 6360000002 HC RX W HCPCS: Performed by: NURSE PRACTITIONER

## 2021-07-26 PROCEDURE — 87086 URINE CULTURE/COLONY COUNT: CPT

## 2021-07-26 PROCEDURE — 70450 CT HEAD/BRAIN W/O DYE: CPT

## 2021-07-26 PROCEDURE — 87491 CHLMYD TRACH DNA AMP PROBE: CPT

## 2021-07-26 PROCEDURE — 81001 URINALYSIS AUTO W/SCOPE: CPT

## 2021-07-26 PROCEDURE — 96374 THER/PROPH/DIAG INJ IV PUSH: CPT

## 2021-07-26 PROCEDURE — 0202U NFCT DS 22 TRGT SARS-COV-2: CPT

## 2021-07-26 PROCEDURE — 87591 N.GONORRHOEAE DNA AMP PROB: CPT

## 2021-07-26 PROCEDURE — 87661 TRICHOMONAS VAGINALIS AMPLIF: CPT

## 2021-07-26 PROCEDURE — 99283 EMERGENCY DEPT VISIT LOW MDM: CPT

## 2021-07-26 PROCEDURE — 84703 CHORIONIC GONADOTROPIN ASSAY: CPT

## 2021-07-26 PROCEDURE — 36415 COLL VENOUS BLD VENIPUNCTURE: CPT

## 2021-07-26 RX ORDER — ONDANSETRON 4 MG/1
4 TABLET, ORALLY DISINTEGRATING ORAL EVERY 8 HOURS PRN
Qty: 10 TABLET | Refills: 0 | Status: SHIPPED | OUTPATIENT
Start: 2021-07-26

## 2021-07-26 RX ORDER — KETOROLAC TROMETHAMINE 30 MG/ML
30 INJECTION, SOLUTION INTRAMUSCULAR; INTRAVENOUS ONCE
Status: COMPLETED | OUTPATIENT
Start: 2021-07-26 | End: 2021-07-26

## 2021-07-26 RX ORDER — HYDROCODONE BITARTRATE AND ACETAMINOPHEN 7.5; 325 MG/1; MG/1
1 TABLET ORAL ONCE
Status: COMPLETED | OUTPATIENT
Start: 2021-07-26 | End: 2021-07-26

## 2021-07-26 RX ORDER — PROCHLORPERAZINE EDISYLATE 5 MG/ML
10 INJECTION INTRAMUSCULAR; INTRAVENOUS ONCE
Status: COMPLETED | OUTPATIENT
Start: 2021-07-26 | End: 2021-07-26

## 2021-07-26 RX ORDER — SODIUM CHLORIDE, SODIUM LACTATE, POTASSIUM CHLORIDE, CALCIUM CHLORIDE 600; 310; 30; 20 MG/100ML; MG/100ML; MG/100ML; MG/100ML
1000 INJECTION, SOLUTION INTRAVENOUS ONCE
Status: COMPLETED | OUTPATIENT
Start: 2021-07-26 | End: 2021-07-26

## 2021-07-26 RX ADMIN — SODIUM CHLORIDE, POTASSIUM CHLORIDE, SODIUM LACTATE AND CALCIUM CHLORIDE 1000 ML: 600; 310; 30; 20 INJECTION, SOLUTION INTRAVENOUS at 19:14

## 2021-07-26 RX ADMIN — PROCHLORPERAZINE EDISYLATE 10 MG: 5 INJECTION INTRAMUSCULAR; INTRAVENOUS at 19:15

## 2021-07-26 RX ADMIN — KETOROLAC TROMETHAMINE 30 MG: 30 INJECTION, SOLUTION INTRAMUSCULAR; INTRAVENOUS at 19:15

## 2021-07-26 RX ADMIN — HYDROCODONE BITARTRATE AND ACETAMINOPHEN 1 TABLET: 7.5; 325 TABLET ORAL at 19:15

## 2021-07-26 ASSESSMENT — PAIN SCALES - GENERAL
PAINLEVEL_OUTOF10: 8
PAINLEVEL_OUTOF10: 8

## 2021-07-26 ASSESSMENT — ENCOUNTER SYMPTOMS
NAUSEA: 1
COUGH: 0
SHORTNESS OF BREATH: 0
VOMITING: 1
ABDOMINAL PAIN: 0

## 2021-07-26 ASSESSMENT — PAIN DESCRIPTION - LOCATION: LOCATION: HEAD

## 2021-07-26 NOTE — TELEPHONE ENCOUNTER
Was dx with UTI at [address] [facility] on Friday. She is taking 3 different abx. Mom unsure if allergic to a medicaition. She is lethargic, vomiting and has  fever. Not improving call mom  ------------------------------  Today has fever 101 , vomiting. Very lethargic. Mom concerned she is worse, advised to have evaluated in ER or UC.  Mom voiced understandoing

## 2021-07-26 NOTE — ED PROVIDER NOTES
[facility] EMERGENCY DEPT  eMERGENCY dEPARTMENT eNCOUnter      Pt Name: Raphael Goodson  MRN: [id number]  [place] 2000  Date of evaluation: 7/26/2021  Provider: Thaddeus Ashford, [address]       Chief Complaint   Patient presents with    Headache    Emesis     taking antibiotics for UTI         HISTORY OF PRESENT ILLNESS   (Location/Symptom, Timing/Onset,Context/Setting, Quality, Duration, Modifying Factors, Severity)  Note limiting factors. Aarti Narrow a 24 y.o. female who presents to the emergency department for evaluation of headache and vomiting. Pt tells me that she has had episodes of vomiting over past few days associated with generalized headache and neck discomfort for the past two days. She has had fevers at home. She reports history of menstrual migraines relating last menstrual period 2 weeks ago. She tells me that she has had urinary frequency and burning treated with macrobid at urgent care Friday. She tells me that she filled prescription for flagyl and doxycycline yesterday however hasn't started this as prescribed for BV per patient. She denies to me problems with vaginal discharge, abdominal pain, cough as well as recent illness. She has normal mobility of neck. She denies head and neck injury. [facility]    Nursing Notes were reviewed. REVIEW OF SYSTEMS    (2-9 systems for level 4, 10 or more for level 5)     Review of Systems   Constitutional: Positive for chills and fever. Respiratory: Negative for cough and shortness of breath. Gastrointestinal: Positive for nausea and vomiting. Negative for abdominal pain. Genitourinary: Positive for dysuria (improved per patient on macrobid). Negative for vaginal discharge. Neurological: Positive for headaches. A complete review of systems was performed and is negative except as noted above in the HPI. PAST MEDICAL HISTORY   History reviewed. No pertinent past medical history.       SURGICAL HISTORY       Past Surgical History: Procedure Laterality Date    EXTERNAL EAR SURGERY Left          CURRENT MEDICATIONS       Discharge Medication List as of 7/26/2021  9:35 PM      CONTINUE these medications which have NOT CHANGED    Details   metroNIDAZOLE (FLAGYL) 500 MG tablet Take 1 tablet by mouth 2 times daily for 7 days, Disp-14 tablet, R-0Normal      nitrofurantoin, macrocrystal-monohydrate, (MACROBID) 100 MG capsule Take 1 capsule by mouth 2 times daily for 7 days, Disp-14 capsule, R-0Normal      norgestimate-ethinyl estradiol (ESTARYLLA) 0.25-35 MG-MCG per tablet TAKE 1 TABLET BY MOUTH EVERY DAY, Disp-28 tablet, R-11Normal             ALLERGIES     Patient has no known allergies. FAMILY HISTORY       Family History   Problem Relation Age of Onset    Other Mother         Irregular menses    Diabetes Maternal Grandmother     Cancer Maternal Grandfather           SOCIAL HISTORY       Social History     Socioeconomic History    Marital status: Single     Spouse name: None    Number of children: None    Years of education: None    Highest education level: None   Occupational History    None   Tobacco Use    Smoking status: Never Smoker    Smokeless tobacco: Never Used   Vaping Use    Vaping Use: Never used   Substance and Sexual Activity    Alcohol use: No     Alcohol/week: 0.0 standard drinks    Drug use: No    Sexual activity: Yes     Partners: Male   Other Topics Concern    None   Social History Narrative    None     Social Determinants of Health     Financial Resource Strain:     Difficulty of Paying Living Expenses:    Food Insecurity:     Worried About Running Out of Food in the Last Year:     Ran Out of Food in the Last Year:    Transportation Needs:     Lack of Transportation (Medical):      Lack of Transportation (Non-Medical):    Physical Activity:     Days of Exercise per Week:     Minutes of Exercise per Session:    Stress:     Feeling of Stress :    Social Connections:     Frequency of Communication with Friends and Family:     Frequency of Social Gatherings with Friends and Family:     Attends Yazdanism Services:     Active Member of Clubs or Organizations:     Attends Club or Organization Meetings:     Marital Status:    Intimate Partner Violence:     Fear of Current or Ex-Partner:     Emotionally Abused:     Physically Abused:     Sexually Abused:        SCREENINGS             PHYSICAL EXAM    (up to 7 for level 4, 8 or more for level 5)     ED Triage Vitals   BP Temp Temp Source Pulse Resp SpO2 Height Weight   07/26/21 1618 07/26/21 1618 07/26/21 1826 07/26/21 1618 07/26/21 1618 07/26/21 1618 07/26/21 1618 07/26/21 1618   (!) 127/95 98.6 °F (37 °C) Oral 86 20 94 % 4' 10\" (1.473 m) 114 lb (51.7 kg)       Physical Exam  Vitals reviewed. HENT:      Head: Normocephalic. Right Ear: External ear normal.      Left Ear: External ear normal.   Eyes:      Conjunctiva/sclera: Conjunctivae normal.      Pupils: Pupils are equal, round, and reactive to light. Neck:      Comments: No meningismus   Cardiovascular:      Rate and Rhythm: Normal rate and regular rhythm. Heart sounds: Normal heart sounds. Pulmonary:      Effort: Pulmonary effort is normal.      Breath sounds: Normal breath sounds. Abdominal:      General: Bowel sounds are normal.      Palpations: Abdomen is soft. Tenderness: There is no abdominal tenderness. Musculoskeletal:         General: Normal range of motion. Cervical back: Normal range of motion and neck supple. No tenderness. Skin:     General: Skin is warm and dry. Neurological:      Mental Status: She is alert and oriented to person, place, and time.       Comments: No facial weakness  Normal speech  No truncal ataxia         DIAGNOSTIC RESULTS     EKG: All EKG's are interpreted by the Emergency Department Physician who either signs or Co-signs this chart in the absence of acardiologist.        RADIOLOGY:   Non-plain film images such as CT, Ultrasound andMRI are read by the radiologist. Plain radiographic images are visualized and preliminarily interpreted by the emergency physician with the below findings:        Interpretation per the Radiologist below, if available at the time of this note:    CT HEAD WO CONTRAST   Final Result   Impression:    1. No CT evidence of an acute intracranial process. 2. Paranasal sinus disease. Signed by Dr Marylen Ko   Final Result   No acute cardiopulmonary process. Signed by Dr Kay Gallegos            ED BEDSIDE ULTRASOUND:   Performed by ED Physician - none    LABS:  Labs Reviewed   CBC WITH AUTO DIFFERENTIAL - Abnormal; Notable for the following components:       Result Value    MCHC 31.5 (*)     Neutrophils % 75.0 (*)     Lymphocytes % 10.0 (*)     Atypical Lymphocytes Relative 10 (*)     All other components within normal limits   URINE RT REFLEX TO CULTURE - Abnormal; Notable for the following components:    Color, UA DARK YELLOW (*)     Clarity, UA CLOUDY (*)     Ketones, Urine 15 (*)     Blood, Urine MODERATE (*)     Protein, UA TRACE (*)     Leukocyte Esterase, Urine SMALL (*)     All other components within normal limits   MICROSCOPIC URINALYSIS - Abnormal; Notable for the following components:    Bacteria, UA TRACE (*)     Crystals, UA NEG (*)     Hyaline Casts, UA 39 (*)     WBC, UA 42 (*)     All other components within normal limits   RESPIRATORY PANEL, MOLECULAR, WITH COVID-19   CULTURE, URINE   CHLAMYDIA/N. GONORRHOEAE/T. VAGINALIS, AMPLIFIED PROBE(UR)   HCG, SERUM, QUALITATIVE   COMPREHENSIVE METABOLIC PANEL W/ REFLEX TO MG FOR LOW K       All other labs were within normal range or not returned as of this dictation. RE-ASSESSMENT     Pt will continue macrobid. Hold flagyl and doxycycline for now. She is tolerating po fluids and remains in no distress at discharge.        EMERGENCY DEPARTMENT COURSE and DIFFERENTIALDIAGNOSIS/MDM:   Vitals:    Vitals:    07/26/21 1618 07/26/21 1826   BP: (!) 127/95    Pulse: 86    Resp: 20    Temp: 98.6 °F (37 °C) 100.1 °F (37.8 °C)   TempSrc:  Oral   SpO2: 94%    Weight: 114 lb (51.7 kg)    Height: 4' 10\" (1.473 m)        MDM      CONSULTS:  None    PROCEDURES:  Unless otherwise notedbelow, none     Procedures    FINAL IMPRESSION     1. Acute nonintractable headache, unspecified headache type    2. Acute cystitis without hematuria    3. Non-intractable vomiting with nausea, unspecified vomiting type          DISPOSITION/PLAN   DISPOSITION Decision To Discharge 07/26/2021 09:30:46 PM      PATIENT REFERRED TO:  Madison Bur[place] [id number] [phone]  Schedule an appointment as soon as possible for a visit   As needed      DISCHARGE MEDICATIONS:       Discharge Medication List as of 7/26/2021  9:35 PM           Medication List      START taking these medications    ondansetron 4 MG disintegrating tablet  Commonly known as: Zofran ODT  Take 1 tablet by mouth every 8 hours as needed for Nausea or Vomiting        ASK your doctor about these medications    metroNIDAZOLE 500 MG tablet  Commonly known as: FLAGYL  Take 1 tablet by mouth 2 times daily for 7 days     nitrofurantoin (macrocrystal-monohydrate) 100 MG capsule  Commonly known as: MACROBID  Take 1 capsule by mouth 2 times daily for 7 days     norgestimate-ethinyl estradiol 0.25-35 MG-MCG per tablet  Commonly known as: Estarylla  TAKE 1 TABLET BY MOUTH EVERY DAY           Where to Get Your Medications      You can get these medications from any pharmacy    Bring a paper prescription for each of these medications  · ondansetron 4 MG disintegrating tablet         (Pleasenote that portions of this note were completed with a voice recognition program.  Efforts were made to edit the dictations but occasionally words are mis-transcribed.)              Tavo Holder, BETH  07/27/21 [facility], APR  07/27/21 2145

## 2021-07-27 LAB
C. TRACHOMATIS DNA ,URINE: NEGATIVE
N. GONORRHOEAE DNA, URINE: NEGATIVE
TRICHOMONAS VAGINALIS DNA, URINE: NEGATIVE

## 2021-07-28 LAB — URINE CULTURE, ROUTINE: NORMAL

## 2021-11-16 ENCOUNTER — OFFICE VISIT (OUTPATIENT)
Dept: OBGYN CLINIC | Age: 21
End: 2021-11-16
Payer: COMMERCIAL

## 2021-11-16 VITALS
DIASTOLIC BLOOD PRESSURE: 90 MMHG | HEIGHT: 58 IN | HEART RATE: 75 BPM | WEIGHT: 111 LBS | BODY MASS INDEX: 23.3 KG/M2 | SYSTOLIC BLOOD PRESSURE: 132 MMHG

## 2021-11-16 DIAGNOSIS — R10.2 PELVIC PAIN: ICD-10-CM

## 2021-11-16 DIAGNOSIS — R21 RASH: ICD-10-CM

## 2021-11-16 DIAGNOSIS — N89.8 VAGINAL DISCHARGE: ICD-10-CM

## 2021-11-16 DIAGNOSIS — Z30.41 ORAL CONTRACEPTIVE PILL SURVEILLANCE: ICD-10-CM

## 2021-11-16 DIAGNOSIS — Z11.3 SCREEN FOR STD (SEXUALLY TRANSMITTED DISEASE): ICD-10-CM

## 2021-11-16 DIAGNOSIS — N90.89 VULVAR LESION: Primary | ICD-10-CM

## 2021-11-16 PROCEDURE — 1036F TOBACCO NON-USER: CPT | Performed by: NURSE PRACTITIONER

## 2021-11-16 PROCEDURE — G8484 FLU IMMUNIZE NO ADMIN: HCPCS | Performed by: NURSE PRACTITIONER

## 2021-11-16 PROCEDURE — 99214 OFFICE O/P EST MOD 30 MIN: CPT | Performed by: NURSE PRACTITIONER

## 2021-11-16 PROCEDURE — G8420 CALC BMI NORM PARAMETERS: HCPCS | Performed by: NURSE PRACTITIONER

## 2021-11-16 PROCEDURE — G8427 DOCREV CUR MEDS BY ELIG CLIN: HCPCS | Performed by: NURSE PRACTITIONER

## 2021-11-16 RX ORDER — VALACYCLOVIR HYDROCHLORIDE 1 G/1
1000 TABLET, FILM COATED ORAL 2 TIMES DAILY
Qty: 20 TABLET | Refills: 0 | Status: SHIPPED | OUTPATIENT
Start: 2021-11-16 | End: 2021-11-26

## 2021-11-16 RX ORDER — NORGESTIMATE AND ETHINYL ESTRADIOL 0.25-0.035
KIT ORAL
Qty: 28 TABLET | Refills: 11 | Status: SHIPPED | OUTPATIENT
Start: 2021-11-16

## 2021-11-16 ASSESSMENT — ENCOUNTER SYMPTOMS
EYES NEGATIVE: 1
GASTROINTESTINAL NEGATIVE: 1
RESPIRATORY NEGATIVE: 1

## 2021-11-16 NOTE — PROGRESS NOTES
Irwin Wilson is a [address] y.o. female who presents today for her medical conditions/ complaints as noted below. Irwin Wilson is c/o of Rash and Gynecologic Exam        HPI  Patient presents today with complaints of rash to labia. Thinks maybe little worse, it itching and is slightly painful. She desires infection swab  Needs ocp rf sent to [facility]  Wants std check, has broken up with recent partner    No LMP recorded.     History reviewed. No pertinent past medical history. Past Surgical History:   Procedure Laterality Date    EXTERNAL EAR SURGERY Left      Family History   Problem Relation Age of Onset    Other Mother         Irregular menses    Diabetes Maternal Grandmother     Cancer Maternal Grandfather      Social History     Tobacco Use    Smoking status: Never Smoker    Smokeless tobacco: Never Used   Substance Use Topics    Alcohol use: No     Alcohol/week: 0.0 standard drinks       Current Outpatient Medications   Medication Sig Dispense Refill    norgestimate-ethinyl estradiol (ESTARYLLA) 0.25-35 MG-MCG per tablet TAKE 1 TABLET BY MOUTH EVERY DAY 28 tablet 11    valACYclovir (VALTREX) 1 g tablet Take 1 tablet by mouth 2 times daily for 10 days 20 tablet 0    ondansetron (ZOFRAN ODT) 4 MG disintegrating tablet Take 1 tablet by mouth every 8 hours as needed for Nausea or Vomiting 10 tablet 0     No current facility-administered medications for this visit. No Known Allergies  Vitals:    21 1335   BP: (!) 132/90   Pulse: 75     Body mass index is 23.2 kg/m². Review of Systems   Constitutional: Negative. HENT: Negative. Eyes: Negative. Respiratory: Negative. Cardiovascular: Negative. Gastrointestinal: Negative. Genitourinary: Positive for genital sores and pelvic pain. Negative for difficulty urinating, dyspareunia, dysuria, enuresis, frequency, hematuria, menstrual problem, urgency and vaginal discharge. Musculoskeletal: Negative. Skin: Negative. Neurological: Negative. Psychiatric/Behavioral: Negative. Physical Exam  Vitals and nursing note reviewed. Constitutional:       General: She is not in acute distress. Appearance: She is well-developed. She is not diaphoretic. HENT:      Head: Normocephalic and atraumatic. Eyes:      Conjunctiva/sclera: Conjunctivae normal.      Pupils: Pupils are equal, round, and reactive to light. Pulmonary:      Effort: Pulmonary effort is normal.   Abdominal:      Tenderness: There is no guarding. Genitourinary:         Comments: propath vaginal swab collected    Musculoskeletal:         General: Normal range of motion. Cervical back: Normal range of motion. Comments: Normal ROM in all 4 extremities; normal gait   Skin:     General: Skin is warm and dry. Neurological:      Mental Status: She is alert and oriented to person, place, and time. Motor: No abnormal muscle tone. Coordination: Coordination normal.   Psychiatric:         Behavior: Behavior normal.          Diagnosis Orders   1. Vulvar lesion  valACYclovir (VALTREX) 1 g tablet    Culture, HSV   2. Rash  valACYclovir (VALTREX) 1 g tablet    Culture, HSV   3. Screen for STD (sexually transmitted disease)     4. Vaginal discharge     5. Pelvic pain     6.  Oral contraceptive pill surveillance  norgestimate-ethinyl estradiol (ESTARYLLA) 0.25-35 MG-MCG per tablet       MEDICATIONS:  Orders Placed This Encounter   Medications    norgestimate-ethinyl estradiol (ESTARYLLA) 0.25-35 MG-MCG per tablet     Sig: TAKE 1 TABLET BY MOUTH EVERY DAY     Dispense:  28 tablet     Refill:  11    valACYclovir (VALTREX) 1 g tablet     Sig: Take 1 tablet by mouth 2 times daily for 10 days     Dispense:  20 tablet     Refill:  0       ORDERS:  Orders Placed This Encounter   Procedures    Culture, HSV       PLAN:  propath and herpes swabs sent  ocp refilled  Start valtrex while await culture, high likelihood of herpes  There are no Patient

## 2021-11-18 ENCOUNTER — CARE COORDINATION (OUTPATIENT)
Dept: OTHER | Facility: CLINIC | Age: 21
End: 2021-11-18

## 2021-11-18 NOTE — CARE COORDINATION
Ambulatory Care Coordination Note  11/18/2021  CM Risk Score: 0  Charlson 10 Year Mortality Risk Score: 2%     ACC: Mati Murillo RN    Summary Note: Spoke with patient today. She said she still has the sore open area in one place near her vagina, she said its about the same but not worse. She said she is taking the valtrex as ordered. I spoke with her about getting a primary care doctor and the benefits of having that for continuity of care. She said \" I have an ob/gyn doctor, I explained to her that they only deal with female issues and checkups related to that. She said oh ok she provided me her email and asked that I send her the phone number to the referral line so she can call and get a doctor. I explained my role and services to her, she  Is agreeable for follow up calls     Ambulatory Care Coordination Assessment    Care Coordination Protocol  Program Enrollment: Complex Care  Referral from Primary Care Provider: No  Week 1 - Initial Assessment     Do you have all of your prescriptions and are they filled?: Yes  Barriers to medication adherence: None  Are you able to afford your medications?: Yes  How often do you have trouble taking your medications the way you have been told to take them?: I always take them as prescribed. Do you have Home O2 Therapy?: No      Ability to seek help/take action for Emergent Urgent situations i.e. fire, crime, inclement weather or health crisis. : Independent  Ability to ambulate to restroom: Independent  Ability handle personal hygeine needs (bathing/dressing/grooming): Independent  Ability to manage Medications: Independent  Ability to prepare Food Preparation: Independent  Ability to maintain home (clean home, laundry): Independent  Ability to drive and/or has transportation: Independent  Ability to do shopping: Independent  Ability to manage finances:  Independent  Is patient able to live independently?: Yes                    Do other services need to be involved to help this patient?: Other care/services not in place and required   Suggested Interventions and Community Resources   Disease Specific Clinic: Completed (Comment: Dr Parker Panchal ( Ob/Gyn) )                  Prior to Admission medications    Medication Sig Start Date End Date Taking? Authorizing Provider   norgestimate-ethinyl estradiol (ESTARYLLA) 0.25-35 MG-MCG per tablet TAKE 1 TABLET BY MOUTH EVERY DAY 11/16/21   [facility], APRN   valACYclovir (VALTREX) 1 g tablet Take 1 tablet by mouth 2 times daily for 10 days 11/16/21 11/26/21  [facility], APRN   ondansetron (ZOFRAN ODT) 4 MG disintegrating tablet Take 1 tablet by mouth every 8 hours as needed for Nausea or Vomiting 7/26/21   BETH Conde       Future Appointments   Date Time Provider Pilar Hughes   1/6/2022 10:30 AM [facility]        Care Coordination Interventions    Program Enrollment: Complex Care  Referral from Primary Care Provider: No  Suggested Interventions and Community Resources  Disease Specific Clinic: Completed (Comment: Dr Parker Panchal ( Ob/Gyn) )         Goals Addressed                    This Visit's Progress      Patient Stated (pt-stated)         Pt will follow up with calling physician referral line to obtain a pcp     Barriers: lack of education  Plan for overcoming my barriers: follow up with ACM  Confidence: 8/10  Anticipated Goal Completion Date: 11/30/21            Prior to Admission medications    Medication Sig Start Date End Date Taking?  Authorizing Provider   norgestimate-ethinyl estradiol (ESTARYLLA) 0.25-35 MG-MCG per tablet TAKE 1 TABLET BY MOUTH EVERY DAY 11/16/21   [facility], APRN   valACYclovir (VALTREX) 1 g tablet Take 1 tablet by mouth 2 times daily for 10 days 11/16/21 11/26/21  [facility], APRN   ondansetron (ZOFRAN ODT) 4 MG disintegrating tablet Take 1 tablet by mouth every 8 hours as needed for Nausea or Vomiting 7/26/21   BETH Conde       Future Appointments Date Time Provider [facility]   1/6/2022 10:30 AM Sergei Kumar MD OB/GYN [facility]

## 2021-11-19 LAB
FINAL REPORT: NORMAL
PRELIMINARY: NORMAL

## 2021-11-22 ENCOUNTER — TELEPHONE (OUTPATIENT)
Dept: OBGYN CLINIC | Age: 21
End: 2021-11-22

## 2021-11-22 DIAGNOSIS — A60.00 GENITAL HERPES SIMPLEX, UNSPECIFIED SITE: Primary | ICD-10-CM

## 2021-11-22 RX ORDER — METRONIDAZOLE 500 MG/1
500 TABLET ORAL 2 TIMES DAILY WITH MEALS
Qty: 14 TABLET | Refills: 0 | Status: SHIPPED | OUTPATIENT
Start: 2021-11-22 | End: 2021-11-29

## 2021-11-22 RX ORDER — VALACYCLOVIR HYDROCHLORIDE 500 MG/1
500 TABLET, FILM COATED ORAL DAILY
Qty: 30 TABLET | Refills: 11 | Status: SHIPPED | OUTPATIENT
Start: 2021-11-22 | End: 2022-07-11 | Stop reason: SDUPTHER

## 2021-11-22 NOTE — TELEPHONE ENCOUNTER
Patient called the voicemail for results:  Per Yoly's message on mychart:Hien, the swab came back and it did show herpes unfortunately. I'm glad you are already on the medication and hopefully that area is about healed up. I am going to send in a new script and will want you to take one pill daily for at least 6 months to prevent recurrence. If you want to take one daily forever to prevent that is fine too. Thank you and so sorry,  ... LM on  to call us back.

## 2021-11-26 ENCOUNTER — CARE COORDINATION (OUTPATIENT)
Dept: OTHER | Facility: CLINIC | Age: 21
End: 2021-11-26

## 2021-11-26 NOTE — CARE COORDINATION
Ambulatory Care Coordination Note  2021  CM Risk Score: 0  Charlson 10 Year Mortality Risk Score: 2%     ACC: Blaire Wesley RN    Summary Note: [facility]) contacted the patient by telephone to follow up on progress, discuss new issues or concerns, and reinforce/ provide patient education. Verified name and  with patient as identifiers. Pt answered phone, said she did receive the email I sent her with the physician referral numbers, suddenly the line went dead. I attempted to call patient back , had to leave a message pt did not answer    Reinforced/ Provided Education:  Discussed red flags and appropriate site of care based on symptoms and resources available to patient including: Benefits related nurse triage line, Urgent care clinics and [address]. Importance and benefits of: Follow up with PCP and specialist, medication adherence, self monitoring and reporting of symptoms. Plan:  Continue biweekly  outreaches to provide telephonic support, education and resources as needed. Discuss / follow up on: Goal progress and status of obtaining a pcp      Pt verbalized understanding and is agreeable to follow up contact. Care Coordination Interventions    Program Enrollment: Complex Care  Referral from Primary Care Provider: No  Suggested Interventions and Community Resources  Disease Specific Clinic: Completed (Comment: Dr Leopoldo Like ( Ob/Gyn) )           Prior to Admission medications    Medication Sig Start Date End Date Taking?  Authorizing Provider   valACYclovir (VALTREX) 500 MG tablet Take 1 tablet by mouth daily 21   Creta Prader, APRN   metroNIDAZOLE (FLAGYL) 500 MG tablet Take 1 tablet by mouth 2 times daily (with meals) for 7 days 21  Creta Prader, APRN   norgestimate-ethinyl estradiol (ESTARYLLA) 0.25-35 MG-MCG per tablet TAKE 1 TABLET BY MOUTH EVERY DAY 21   Creta Prader, APRN   valACYclovir (VALTREX) 1 g tablet Take 1 tablet by mouth 2 times daily for 10 days 11/16/21 11/26/21  BETH Rudd   ondansetron (ZOFRAN ODT) 4 MG disintegrating tablet Take 1 tablet by mouth every 8 hours as needed for Nausea or Vomiting 7/26/21   BETH Correa       Future Appointments   Date Time Provider Pilar Hughes   1/6/2022 10:30 AM Yana Julian MD OB/GYN [facility]

## 2021-12-07 ENCOUNTER — CARE COORDINATION (OUTPATIENT)
Dept: OTHER | Facility: CLINIC | Age: 21
End: 2021-12-07

## 2021-12-07 NOTE — CARE COORDINATION
ACM attempted to reach patient for follow up call regarding obtaining a pcp . HIPAA compliant message left requesting a return phone call at patients convenience. Will continue to follow.      Krishan JAIME, RN- [facility]  Associate Care Manager  [phone]

## 2021-12-28 ENCOUNTER — CARE COORDINATION (OUTPATIENT)
Dept: OTHER | Facility: CLINIC | Age: 21
End: 2021-12-28

## 2021-12-28 NOTE — CARE COORDINATION
Ambulatory Care Coordination Note  12/28/2021  CM Risk Score: 0  Charlson 10 Year Mortality Risk Score: 2%     ACC: Grabiel Parada, ENRIQUE    Summary Note: ACM attempted to reach patient for follow up call regarding obtaining a physician. UTR letter sent via My Chart . HIPAA compliant message left requesting a return phone call at patients convenience. Care Coordination Interventions    Program Enrollment: Complex Care  Referral from Primary Care Provider: No  Suggested Interventions and Community Resources  Disease Specific Clinic: Completed (Comment: Dr Noah Gunderson ( Ob/Gyn) )           Prior to Admission medications    Medication Sig Start Date End Date Taking?  Authorizing Provider   valACYclovir (VALTREX) 500 MG tablet Take 1 tablet by mouth daily 11/22/21   BETH Colby   norgestimate-ethinyl estradiol (ESTARYLLA) 0.25-35 MG-MCG per tablet TAKE 1 TABLET BY MOUTH EVERY DAY 11/16/21   BETH Colby   ondansetron (ZOFRAN ODT) 4 MG disintegrating tablet Take 1 tablet by mouth every 8 hours as needed for Nausea or Vomiting 7/26/21   BETH Mercedes - CNP       Future Appointments   Date Time Provider Pilar Hughes   1/6/2022 10:30 AM Renata Reyna MD OB/GYN [facility], RN- [facility]  Associate Care Manager  [phone]

## 2021-12-28 NOTE — LETTER
Dear April Watts:      My name is Vickie Lloyd, Associate Care Manager for [facility] and I have been trying to reach you. The Associate Care Management (ACM) program is a free-of-charge confidential service provided to our employees and their family members covered by the [facility] [address]. The program will provide an associate and his/her family with the [facility]'s expertise to assist in navigating the health care delivery system, provider services, and their overall care needsso as to assure and improve health care interactions and enhance the quality of life. This program is designed to provide you with the opportunity to have a [facility] partner with you for the following services:     1) when you come home from the hospital or emergency room   2) when help is needed to manage your disease   3) when you need assistance coordinating services or appointments  4) when you need additional education, resources or assistance reaching your Be Well Health Program goals/requirements such as Be Well With Diabetes      [facility] is dedicated to empowering the good health of its community and improving the quality of care and care experiences for employees and their families. We are committed to safeguarding patient confidentiality and privacy, assuring that every employee has the respect he or she deserves in managing their health. The information shared with your care manager will not be shared with anyone else aside from those you identify as part of your care team, and will only be used to assist you with any identified care needs. Please contact me if you would like this service provided to you. Sincerely,    Vickie JAIME, RN- [facility]  Associate Care Manager  [phone]        If you have any questions or concerns, please don't hesitate to call.     Sincerely,        Drake Parkinson, RN

## 2022-01-11 ENCOUNTER — CARE COORDINATION (OUTPATIENT)
Dept: OTHER | Facility: CLINIC | Age: 22
End: 2022-01-11

## 2022-01-11 NOTE — CARE COORDINATION
Ambulatory Care Coordination Note  1/11/2022  CM Risk Score: 0  Charlson 10 Year Mortality Risk Score: 2%     ACC: Eyad Kraus RN    Summary Note: Associate Care Manager (ACM) attempted final outreach to reach patient for follow up call regarding obtaining a pcp and care mtg follow up . HIPAA compliant message left requesting a return phone call at patients convenience. Lost to follow up letter sent via My Chart. No further outreach scheduled with this ACM, ACM will sign off care team at this time. Episode of Care resolved. Patient has this ACM's contact information if future needs arise. Care Coordination Interventions    Program Enrollment: Complex Care  Referral from Primary Care Provider: No  Suggested Interventions and Community Resources  Disease Specific Clinic: Completed (Comment: Dr Nasir Wakefield ( Ob/Gyn) )             Prior to Admission medications    Medication Sig Start Date End Date Taking? Authorizing Provider   valACYclovir (VALTREX) 500 MG tablet Take 1 tablet by mouth daily 11/22/21   BETH Castillo   norgestimate-ethinyl estradiol (ESTARYLLA) 0.25-35 MG-MCG per tablet TAKE 1 TABLET BY MOUTH EVERY DAY 11/16/21   BETH Castillo   ondansetron (ZOFRAN ODT) 4 MG disintegrating tablet Take 1 tablet by mouth every 8 hours as needed for Nausea or Vomiting 7/26/21   BETH Hirsch - CNP       No future appointments.

## 2022-01-11 NOTE — LETTER
Dear Jose Grant:      I have been trying to reach you for a follow up call for services with our Associate Care Management Program. Your wellbeing is very important to us. With continued partnership in the [facility] program, we hope to work with you to optimize your health and increase your quality of life. I look forward to continuing to work with you. Please contact me at your convenience and we can schedule a time that works best for you. Sincerely,  Bright Mail YENI, RN- [facility]  Associate Care Manager  [phone]      If you have any questions or concerns, please don't hesitate to call.

## 2022-01-28 ENCOUNTER — OFFICE VISIT (OUTPATIENT)
Dept: OBGYN CLINIC | Age: 22
End: 2022-01-28
Payer: COMMERCIAL

## 2022-01-28 VITALS
DIASTOLIC BLOOD PRESSURE: 80 MMHG | BODY MASS INDEX: 22.67 KG/M2 | HEIGHT: 58 IN | SYSTOLIC BLOOD PRESSURE: 130 MMHG | WEIGHT: 108 LBS

## 2022-01-28 DIAGNOSIS — N89.8 VAGINAL DISCHARGE: ICD-10-CM

## 2022-01-28 DIAGNOSIS — A60.00 GENITAL HERPES SIMPLEX, UNSPECIFIED SITE: ICD-10-CM

## 2022-01-28 DIAGNOSIS — B00.9 HSV INFECTION: ICD-10-CM

## 2022-01-28 DIAGNOSIS — B96.89 BV (BACTERIAL VAGINOSIS): ICD-10-CM

## 2022-01-28 DIAGNOSIS — N89.8 VAGINAL ODOR: ICD-10-CM

## 2022-01-28 DIAGNOSIS — A60.00 GENITAL HERPES SIMPLEX, UNSPECIFIED SITE: Primary | ICD-10-CM

## 2022-01-28 DIAGNOSIS — N76.0 BV (BACTERIAL VAGINOSIS): ICD-10-CM

## 2022-01-28 PROCEDURE — 1036F TOBACCO NON-USER: CPT | Performed by: OBSTETRICS & GYNECOLOGY

## 2022-01-28 PROCEDURE — G8484 FLU IMMUNIZE NO ADMIN: HCPCS | Performed by: OBSTETRICS & GYNECOLOGY

## 2022-01-28 PROCEDURE — G8427 DOCREV CUR MEDS BY ELIG CLIN: HCPCS | Performed by: OBSTETRICS & GYNECOLOGY

## 2022-01-28 PROCEDURE — 99214 OFFICE O/P EST MOD 30 MIN: CPT | Performed by: OBSTETRICS & GYNECOLOGY

## 2022-01-28 PROCEDURE — G8420 CALC BMI NORM PARAMETERS: HCPCS | Performed by: OBSTETRICS & GYNECOLOGY

## 2022-01-28 RX ORDER — METRONIDAZOLE 7.5 MG/G
1 GEL VAGINAL NIGHTLY
Qty: 1 EACH | Refills: 0 | Status: SHIPPED | OUTPATIENT
Start: 2022-01-28 | End: 2022-02-02

## 2022-01-28 ASSESSMENT — ENCOUNTER SYMPTOMS
EYES NEGATIVE: 1
GASTROINTESTINAL NEGATIVE: 1
RESPIRATORY NEGATIVE: 1

## 2022-01-28 NOTE — PROGRESS NOTES
Yessica Stone is a 22 y.o. who presents today for complaints of vaginal discharge and odor. She has had the discharge for approximately 2 wks. Describes a sour smell. Some vaginal irritation. Was diagnosed with HSV via culture. She would like to discuss what type of HSV she has. Review of Systems   Constitutional: Negative. HENT: Negative. Eyes: Negative. Respiratory: Negative. Cardiovascular: Negative. Gastrointestinal: Negative. Genitourinary: Positive for vaginal discharge. Negative for dysuria, frequency, menstrual problem, pelvic pain and urgency. Skin: Negative. Neurological: Negative. Psychiatric/Behavioral: Negative. Past Medical History:   Diagnosis Date    Genital herpes        Past Surgical History:   Procedure Laterality Date    EXTERNAL EAR SURGERY Left        Family History   Problem Relation Age of Onset    Other Mother         Irregular menses    Diabetes Maternal Grandmother     Cancer Maternal Grandfather        Social History     Socioeconomic History    Marital status: Single     Spouse name: Not on file    Number of children: Not on file    Years of education: Not on file    Highest education level: Not on file   Occupational History    Not on file   Tobacco Use    Smoking status: Never Smoker    Smokeless tobacco: Never Used   Vaping Use    Vaping Use: Never used   Substance and Sexual Activity    Alcohol use: No     Alcohol/week: 0.0 standard drinks    Drug use: No    Sexual activity: Yes     Partners: Male   Other Topics Concern    Not on file   Social History Narrative    Not on file     Social Determinants of Health     Financial Resource Strain:     Difficulty of Paying Living Expenses: Not on file   Food Insecurity:     Worried About Running Out of Food in the Last Year: Not on file    [place] of Food in the Last Year: Not on file   Transportation Needs:     Lack of Transportation (Medical):  Not on file    Lack of Transportation (Non-Medical): Not on file   Physical Activity:     Days of Exercise per Week: Not on file    Minutes of Exercise per Session: Not on file   Stress:     Feeling of Stress : Not on file   Social Connections:     Frequency of Communication with Friends and Family: Not on file    Frequency of Social Gatherings with Friends and Family: Not on file    Attends Mandaeism Services: Not on file    Active Member of [address] [place] [address] or Organizations: Not on file    Attends Club or Organization Meetings: Not on file    Marital Status: Not on file   Intimate Partner Violence:     Fear of Current or Ex-Partner: Not on file    Emotionally Abused: Not on file    Physically Abused: Not on file    Sexually Abused: Not on file   Housing Stability:     Unable to Pay for Housing in the Last Year: Not on file    Number of [place] in the Last Year: Not on file    Unstable Housing in the Last Year: Not on file         Current Outpatient Medications:     metroNIDAZOLE (METROGEL) 0.75 % vaginal gel, Place 1 Applicatorful vaginally nightly for 5 days, Disp: 1 each, Rfl: 0    valACYclovir (VALTREX) 500 MG tablet, Take 1 tablet by mouth daily, Disp: 30 tablet, Rfl: 11    norgestimate-ethinyl estradiol (ESTARYLLA) 0.25-35 MG-MCG per tablet, TAKE 1 TABLET BY MOUTH EVERY DAY, Disp: 28 tablet, Rfl: 11    ondansetron (ZOFRAN ODT) 4 MG disintegrating tablet, Take 1 tablet by mouth every 8 hours as needed for Nausea or Vomiting, Disp: 10 tablet, Rfl: 0    No Known Allergies    /80   Ht 4' 10\" (1.473 m)   Wt 108 lb (49 kg)   LMP 01/20/2022   BMI 22.57 kg/m²   Physical Exam  Constitutional:       General: She is not in acute distress. Appearance: She is well-developed. She is not diaphoretic. HENT:      Head: Normocephalic and atraumatic. Hair is normal.      Right Ear: Hearing and external ear normal. No decreased hearing noted. Left Ear: Hearing and external ear normal. No decreased hearing noted. Eyes:      General: No scleral icterus. Conjunctiva/sclera: Conjunctivae normal.      Pupils: Pupils are equal, round, and reactive to light. Pulmonary:      Effort: Pulmonary effort is normal. No respiratory distress. Abdominal:      General: There is no distension. Palpations: Abdomen is soft. There is no mass. Tenderness: There is no abdominal tenderness. There is no guarding or rebound. Genitourinary:     Labia:         Right: No rash, tenderness or lesion. Left: No rash, tenderness or lesion. Vagina: Vaginal discharge present. Cervix: No cervical motion tenderness, discharge or friability. Adnexa:         Right: No mass, tenderness or fullness. Left: No mass, tenderness or fullness. Rectum: Normal.   Musculoskeletal:         General: No tenderness or deformity. Normal range of motion. Cervical back: Normal range of motion. Skin:     General: Skin is warm and dry. Coloration: Skin is not pale. Findings: No erythema or rash. Neurological:      Mental Status: She is alert and oriented to person, place, and time. Cranial Nerves: No cranial nerve deficit. Psychiatric:         Speech: Speech normal.         Behavior: Behavior normal.         Thought Content: Thought content normal.         Judgment: Judgment normal.       Microscopic wet-mount exam shows clue cells, excessive bacteria. Assessment   Diagnosis Orders   1. Genital herpes simplex, unspecified site  Herpes Simplex virus (HSV) I/II Antibodies IgG & IgM w Reflex   2. Vaginal discharge  [place] WET KIN/ W PREPARATIONS   3. Vaginal odor  [place] WET KIN/ W PREPARATIONS   4. HSV infection  Herpes Simplex virus (HSV) I/II Antibodies IgG & IgM w Reflex   5. BV (bacterial vaginosis)  metroNIDAZOLE (METROGEL) 0.75 % vaginal gel       Plan     1. HSV discussed, HSV for typing ordered  2. Propath sent  3. Wet prep - BV, Metrogel sent  4. Will notify of results.    Vince Montejo Doretha العلي, am scribing for and in the presence of Dr. Sapna Alvarado. I, Dr. Sapna Alvarado, personally performed the services described in this documentation as scribed by Adolfo Galan in my presence, and it is both accurate and complete.

## 2022-01-28 NOTE — PROGRESS NOTES
Pt states she had a swab in 11/2021 and was told she is HSV+ but she wasn't sure which one it is. Pt states she is having vaginal discharge, it is heavy before and after her period. Pt also states she has a vaginal odor she notices mostly after her period.

## 2022-01-31 LAB
HERPES TYPE 1/2 IGM COMBINED: 1.25 IV
HERPES TYPE I/II IGG COMBINED: 11 IV
HSV 1 GLYCOPROTEIN G AB IGG: 0.8 IV
HSV 2 GLYCOPROTEIN G AB IGG: 12.6 IV

## 2022-02-01 ENCOUNTER — TELEPHONE (OUTPATIENT)
Dept: OBGYN CLINIC | Age: 22
End: 2022-02-01

## 2022-04-22 ENCOUNTER — OFFICE VISIT (OUTPATIENT)
Age: 22
End: 2022-04-22
Payer: COMMERCIAL

## 2022-04-22 VITALS
WEIGHT: 109.4 LBS | TEMPERATURE: 98.2 F | SYSTOLIC BLOOD PRESSURE: 136 MMHG | BODY MASS INDEX: 22.05 KG/M2 | OXYGEN SATURATION: 99 % | DIASTOLIC BLOOD PRESSURE: 72 MMHG | HEART RATE: 66 BPM | HEIGHT: 59 IN | RESPIRATION RATE: 18 BRPM

## 2022-04-22 DIAGNOSIS — J02.9 SORE THROAT: ICD-10-CM

## 2022-04-22 DIAGNOSIS — J30.9 ALLERGIC RHINITIS, UNSPECIFIED SEASONALITY, UNSPECIFIED TRIGGER: Primary | ICD-10-CM

## 2022-04-22 LAB — S PYO AG THROAT QL: NORMAL

## 2022-04-22 PROCEDURE — 99213 OFFICE O/P EST LOW 20 MIN: CPT | Performed by: NURSE PRACTITIONER

## 2022-04-22 PROCEDURE — 87880 STREP A ASSAY W/OPTIC: CPT | Performed by: NURSE PRACTITIONER

## 2022-04-22 PROCEDURE — G8427 DOCREV CUR MEDS BY ELIG CLIN: HCPCS | Performed by: NURSE PRACTITIONER

## 2022-04-22 PROCEDURE — G8420 CALC BMI NORM PARAMETERS: HCPCS | Performed by: NURSE PRACTITIONER

## 2022-04-22 PROCEDURE — 1036F TOBACCO NON-USER: CPT | Performed by: NURSE PRACTITIONER

## 2022-04-22 ASSESSMENT — ENCOUNTER SYMPTOMS
WHEEZING: 0
COLOR CHANGE: 0
RHINORRHEA: 0
SINUS PRESSURE: 0
CONSTIPATION: 0
BLOOD IN STOOL: 0
ABDOMINAL PAIN: 0
EYE ITCHING: 0
SHORTNESS OF BREATH: 0
VOMITING: 0
SORE THROAT: 1
NAUSEA: 0
EYE DISCHARGE: 0
COUGH: 0
DIARRHEA: 0
SWOLLEN GLANDS: 1

## 2022-04-22 NOTE — LETTER
[facility]  [address] [id number]  Phone: [phone]  Fax: BETH Price CNP        April 22, 2022     Patient: Gale Stewart   YOB: 2000   Date of Visit: 4/22/2022       To Whom it May Concern:    Gale Stewart was seen in my clinic on 4/22/2022. She may return to work on 04/23/2022. If you have any questions or concerns, please don't hesitate to call.     Sincerely,         BETH Peter - CNP

## 2022-04-22 NOTE — PATIENT INSTRUCTIONS
Patient Education        Allergies: Care Instructions  Overview     Allergies occur when your body's defense system (immune system) overreacts to certain substances. The immune system treats a harmless substance as if it were a harmful germ or virus. Many things can make this happen. These includepollens, medicine, food, dust, animal dander, and mold. Allergies can be mild or severe. Mild allergies can be managed with hometreatment. But medicine may be needed to prevent problems. Managing your allergies is an important part of staying healthy. Your doctor may suggest that you have allergy testing to help find out what is causing yourallergies. Severe allergies can cause reactions that affect your whole body (anaphylactic reactions). Your doctor may prescribe a shot of epinephrine to carry with you in case you have a severe reaction. Learn how to give yourself the shot andkeep it with you at all times. Make sure it is not . Follow-up care is a key part of your treatment and safety. Be sure to make and go to all appointments, and call your doctor if you are having problems. It's also a good idea to know your test results and keep alist of the medicines you take. How can you care for yourself at home?  If you have been told by your doctor that dust or dust mites are causing your allergy, decrease the dust around your bed:  ? Wash sheets, pillowcases, and other bedding in hot water every week. ? Use dust-proof covers for pillows, duvets, and mattresses. Avoid plastic covers because they tear easily and do not \"breathe. \" Wash as instructed on the label. ? Do not use any blankets and pillows that you do not need. ? Use blankets that you can wash in your washing machine. ? Consider removing drapes and carpets, which attract and hold dust, from your bedroom.  If you are allergic to house dust and mites, do not use home humidifiers. Your doctor can suggest ways you can control dust and mites.    Look for signs of cockroaches. Cockroaches cause allergic reactions. Use cockroach baits to get rid of them. Then, clean your home well. Cockroaches like areas where grocery bags, newspapers, empty bottles, or cardboard boxes are stored. Do not keep these inside your home, and keep trash and food containers sealed. Seal off any spots where cockroaches might enter your home.  If you are allergic to mold, get rid of furniture, rugs, and drapes that smell musty. Check for mold in the bathroom.  If you are allergic to outdoor pollen or mold spores, use air-conditioning. Change or clean all filters every month. Keep windows closed.  If you are allergic to pollen, stay inside when pollen counts are high. Use a vacuum  with a HEPA filter or a double-thickness filter at least two times each week.  Stay inside when air pollution is bad. Avoid paint fumes, perfumes, and other strong odors.  Avoid conditions that make your allergies worse. Stay away from smoke. Do not smoke or let anyone else smoke in your house. Do not use fireplaces or wood-burning stoves.  If you are allergic to your pets, change the air filter in your furnace every month. Use high-efficiency filters.  If you are allergic to pet dander, keep pets outside or out of your bedroom. Old carpet and cloth furniture can hold a lot of animal dander. You may need to replace them. When should you call for help? Give an epinephrine shot if:     You think you are having a severe allergic reaction.      You have symptoms in more than one body area, such as mild nausea and an itchy mouth. After giving an epinephrine shot call 911, even if you feel better. Call 911 if:     You have symptoms of a severe allergic reaction. These may include:  ? Sudden raised, red areas (hives) all over your body. ? Swelling of the throat, mouth, lips, or tongue. ? Trouble breathing. ? Passing out (losing consciousness).  Or you may feel very lightheaded or suddenly

## 2022-04-22 NOTE — PROGRESS NOTES
[address] [facility] [address] [id number]  Dept: [phone]  Dept Fax: [phone]  Loc: [phone]    Jumana Parry is a 22 y.o. female who presents today for her medical conditions/complaints as noted below. Jumana Parry is complaining of Lymphadenopathy and Drainage        HPI:   Pharyngitis  This is a new problem. The current episode started in the past 7 days (2 days ago). The problem occurs constantly. The problem has been waxing and waning. Associated symptoms include a sore throat and swollen glands. Pertinent negatives include no abdominal pain, chest pain, chills, congestion, coughing, fatigue, fever, headaches, myalgias, nausea, rash or vomiting. The symptoms are aggravated by swallowing. She has tried acetaminophen for the symptoms. The treatment provided mild relief. Past Medical History:   Diagnosis Date    Genital herpes        Past Surgical History:   Procedure Laterality Date    EXTERNAL EAR SURGERY Left        Family History   Problem Relation Age of Onset    Other Mother         Irregular menses    Diabetes Maternal Grandmother     Cancer Maternal Grandfather        Social History     Tobacco Use    Smoking status: Never Smoker    Smokeless tobacco: Never Used   Substance Use Topics    Alcohol use: No     Alcohol/week: 0.0 standard drinks        Current Outpatient Medications   Medication Sig Dispense Refill    norgestimate-ethinyl estradiol (ESTARYLLA) 0.25-35 MG-MCG per tablet TAKE 1 TABLET BY MOUTH EVERY DAY 28 tablet 11    valACYclovir (VALTREX) 500 MG tablet Take 1 tablet by mouth daily (Patient not taking: Reported on 4/22/2022) 30 tablet 11    ondansetron (ZOFRAN ODT) 4 MG disintegrating tablet Take 1 tablet by mouth every 8 hours as needed for Nausea or Vomiting (Patient not taking: Reported on 4/22/2022) 10 tablet 0     No current facility-administered medications for this visit.        No Known Allergies    Health Maintenance   Topic Date Due    COVID-19 Vaccine (1) Never done    Hepatitis C screen  Never done    Depression Screen  12/21/2021    Chlamydia screen  07/26/2022    Flu vaccine (Season Ended) 09/01/2022    DTaP/Tdap/Td vaccine (5 - Td or Tdap) 02/26/2029    Hepatitis A vaccine  Completed    Hepatitis B vaccine  Completed    Hib vaccine  Completed    HPV vaccine  Completed    Varicella vaccine  Completed    Meningococcal (ACWY) vaccine  Completed    HIV screen  Completed    Pneumococcal 0-64 years Vaccine  Aged Out       Subjective:   Review of Systems   Constitutional: Negative for activity change, appetite change, chills, fatigue and fever. HENT: Positive for sore throat. Negative for congestion, ear pain, rhinorrhea and sinus pressure. Eyes: Negative for discharge and itching. Respiratory: Negative for cough, shortness of breath and wheezing. Cardiovascular: Negative for chest pain. Gastrointestinal: Negative for abdominal pain, blood in stool, constipation, diarrhea, nausea and vomiting. Musculoskeletal: Negative for myalgias. Skin: Negative for color change and rash. Neurological: Negative for dizziness and headaches. All other systems reviewed and are negative. Objective    Physical Exam  Vitals and nursing note reviewed. Constitutional:       General: She is not in acute distress. Appearance: Normal appearance. HENT:      Head: Normocephalic and atraumatic. Right Ear: Tympanic membrane and ear canal normal.      Left Ear: Tympanic membrane and ear canal normal.      Mouth/Throat:      Mouth: Mucous membranes are moist.      Pharynx: Posterior oropharyngeal erythema present. Tonsils: 2+ on the right. 2+ on the left. Eyes:      Extraocular Movements: Extraocular movements intact. Pupils: Pupils are equal, round, and reactive to light. Cardiovascular:      Rate and Rhythm: Normal rate and regular rhythm.       Pulses: Normal pulses. Heart sounds: Normal heart sounds. No murmur heard. Pulmonary:      Effort: Pulmonary effort is normal. No respiratory distress. Breath sounds: Normal breath sounds. No wheezing. Lymphadenopathy:      Head:      Right side of head: Tonsillar adenopathy present. Skin:     General: Skin is warm. Capillary Refill: Capillary refill takes less than 2 seconds. Coloration: Skin is not pale. Findings: No rash. Neurological:      General: No focal deficit present. Mental Status: She is alert and oriented to person, place, and time. Psychiatric:         Attention and Perception: Attention normal.         Mood and Affect: Mood normal.         Behavior: Behavior normal. Behavior is cooperative. Thought Content: Thought content normal.         /72   Pulse 66   Temp 98.2 °F (36.8 °C) (Temporal)   Resp 18   Ht 4' 11\" (1.499 m)   Wt 109 lb 6.4 oz (49.6 kg)   SpO2 99%   BMI 22.10 kg/m²     Assessment         Diagnosis Orders   1. Allergic rhinitis, unspecified seasonality, unspecified trigger     2. Sore throat  POCT rapid strep A       Plan   - Increase fluid intake  - Recommended OTC flonase  - Recommended OTC claritin or zyrtec  - The patient is to follow up with PCP or return to clinic if symptoms worsen/fail to improve. Orders Placed This Encounter   Procedures    POCT rapid strep A       Results for orders placed or performed in visit on 04/22/22   POCT rapid strep A   Result Value Ref Range    Strep A Ag None Detected None Detected     Return if symptoms worsen or fail to improve. Discussed use, benefits, and side effects of any prescribed medications. All patient questions were answered. Patient voiced understanding of care plan. Patient was given educational materials - see patient instructions below.      Patient Instructions       Patient Education        Allergies: Care Instructions  Overview     Allergies occur when your body's defense system (immune system) overreacts to certain substances. The immune system treats a harmless substance as if it were a harmful germ or virus. Many things can make this happen. These includepollens, medicine, food, dust, animal dander, and mold. Allergies can be mild or severe. Mild allergies can be managed with hometreatment. But medicine may be needed to prevent problems. Managing your allergies is an important part of staying healthy. Your doctor may suggest that you have allergy testing to help find out what is causing yourallergies. Severe allergies can cause reactions that affect your whole body (anaphylactic reactions). Your doctor may prescribe a shot of epinephrine to carry with you in case you have a severe reaction. Learn how to give yourself the shot andkeep it with you at all times. Make sure it is not . Follow-up care is a key part of your treatment and safety. Be sure to make and go to all appointments, and call your doctor if you are having problems. It's also a good idea to know your test results and keep alist of the medicines you take. How can you care for yourself at home?  If you have been told by your doctor that dust or dust mites are causing your allergy, decrease the dust around your bed:  ? Wash sheets, pillowcases, and other bedding in hot water every week. ? Use dust-proof covers for pillows, duvets, and mattresses. Avoid plastic covers because they tear easily and do not \"breathe. \" Wash as instructed on the label. ? Do not use any blankets and pillows that you do not need. ? Use blankets that you can wash in your washing machine. ? Consider removing drapes and carpets, which attract and hold dust, from your bedroom.  If you are allergic to house dust and mites, do not use home humidifiers. Your doctor can suggest ways you can control dust and mites.  Look for signs of cockroaches. Cockroaches cause allergic reactions. Use cockroach baits to get rid of them. Then, clean your home well. Cockroaches like areas where grocery bags, newspapers, empty bottles, or cardboard boxes are stored. Do not keep these inside your home, and keep trash and food containers sealed. Seal off any spots where cockroaches might enter your home.  If you are allergic to mold, get rid of furniture, rugs, and drapes that smell musty. Check for mold in the bathroom.  If you are allergic to outdoor pollen or mold spores, use air-conditioning. Change or clean all filters every month. Keep windows closed.  If you are allergic to pollen, stay inside when pollen counts are high. Use a vacuum  with a HEPA filter or a double-thickness filter at least two times each week.  Stay inside when air pollution is bad. Avoid paint fumes, perfumes, and other strong odors.  Avoid conditions that make your allergies worse. Stay away from smoke. Do not smoke or let anyone else smoke in your house. Do not use fireplaces or wood-burning stoves.  If you are allergic to your pets, change the air filter in your furnace every month. Use high-efficiency filters.  If you are allergic to pet dander, keep pets outside or out of your bedroom. Old carpet and cloth furniture can hold a lot of animal dander. You may need to replace them. When should you call for help? Give an epinephrine shot if:     You think you are having a severe allergic reaction.      You have symptoms in more than one body area, such as mild nausea and an itchy mouth. After giving an epinephrine shot call 911, even if you feel better. Call 911 if:     You have symptoms of a severe allergic reaction. These may include:  ? Sudden raised, red areas (hives) all over your body. ? Swelling of the throat, mouth, lips, or tongue. ? Trouble breathing. ? Passing out (losing consciousness).  Or you may feel very lightheaded or suddenly feel weak, confused, or restless.      You have been given an epinephrine shot, even if you feel better. Call your doctor now or seek immediate medical care if:     You have symptoms of an allergic reaction, such as:  ? A rash or hives (raised, red areas on the skin). ? Itching. ? Swelling. ? Belly pain, nausea, or vomiting. Watch closely for changes in your health, and be sure to contact your doctor if:     You do not get better as expected. Where can you learn more? Go to https://[employer] Mar[employer] MaterialspeResolute Networkseb.MedicAnimal.com. org and sign in to your [employer] account. Enter T875 in the [employer] box to learn more about \"Allergies: Care Instructions. \"     If you do not have an account, please click on the \"Sign Up Now\" link. Current as of: February 10, 2021               Content Version: 13.2  © 2708-3941 Healthwise, Incorporated. Care instructions adapted under license by [facility] ([facility]). If you have questions about a medical condition or this instruction, always ask your healthcare professional. Ramírez Garcia disclaims any warranty or liability for your use of this information.       - Increase fluid intake  - Recommended OTC flonase  - Recommended OTC claritin or zyrtec  - The patient is to follow up with PCP or return to clinic if symptoms worsen/fail to improve.             Electronically signed by BETH Traore CNP on 4/22/2022 at 12:59 PM

## 2022-05-04 ENCOUNTER — OFFICE VISIT (OUTPATIENT)
Age: 22
End: 2022-05-04
Payer: COMMERCIAL

## 2022-05-04 VITALS
TEMPERATURE: 98.2 F | SYSTOLIC BLOOD PRESSURE: 100 MMHG | BODY MASS INDEX: 21.53 KG/M2 | OXYGEN SATURATION: 99 % | WEIGHT: 106.8 LBS | HEIGHT: 59 IN | DIASTOLIC BLOOD PRESSURE: 60 MMHG | HEART RATE: 78 BPM | RESPIRATION RATE: 18 BRPM

## 2022-05-04 DIAGNOSIS — J30.9 ALLERGIC RHINITIS, UNSPECIFIED SEASONALITY, UNSPECIFIED TRIGGER: Primary | ICD-10-CM

## 2022-05-04 PROCEDURE — 99213 OFFICE O/P EST LOW 20 MIN: CPT

## 2022-05-04 PROCEDURE — G8420 CALC BMI NORM PARAMETERS: HCPCS

## 2022-05-04 PROCEDURE — G8428 CUR MEDS NOT DOCUMENT: HCPCS

## 2022-05-04 PROCEDURE — 96372 THER/PROPH/DIAG INJ SC/IM: CPT

## 2022-05-04 PROCEDURE — 1036F TOBACCO NON-USER: CPT

## 2022-05-04 RX ORDER — DEXAMETHASONE SODIUM PHOSPHATE 10 MG/ML
10 INJECTION INTRAMUSCULAR; INTRAVENOUS ONCE
Status: COMPLETED | OUTPATIENT
Start: 2022-05-04 | End: 2022-05-04

## 2022-05-04 RX ADMIN — DEXAMETHASONE SODIUM PHOSPHATE 10 MG: 10 INJECTION INTRAMUSCULAR; INTRAVENOUS at 15:02

## 2022-05-04 ASSESSMENT — ENCOUNTER SYMPTOMS
SORE THROAT: 0
COUGH: 1

## 2022-05-04 NOTE — PATIENT INSTRUCTIONS
1. Steroid shot in office today, avoid steroids for the next 3 months to avoid risk of brittle bones  2. Continue flonase and zyrtec daily. 3. Monitor for new fever, headache or worsening symptoms and return if they occur. Patient Education        Seasonal Allergies: Care Instructions  Your Care Instructions     Allergies occur when your body's defense system (immune system) overreacts to certain substances. The immune system treats a harmless substance as if it were a harmful germ or virus. Many things can cause this to happen. Examples includepollens, medicine, food, dust, animal dander, and mold. Your allergies are seasonal if you have symptoms just at certain times of the year. In that case, you are probably allergic to pollens from certain trees,grasses, or weeds. Allergies can be mild or severe. Over-the-counter allergy medicine may helpwith some symptoms. Read and follow all instructions on the label. Managing your allergies is an important part of staying healthy. Your doctor may suggest that you have tests to help find the cause of your allergies. When you know what things trigger your symptoms, you can avoid them. This canprevent allergy symptoms and other health problems. In some cases, immunotherapy might help. For this treatment, you get shots or use pills that have a small amount of certain allergens in them. Your body \"gets used to\" the allergen, so you react less to it over time. This kind oftreatment may help prevent or reduce some allergy symptoms. Follow-up care is a key part of your treatment and safety. Be sure to make and go to all appointments, and call your doctor if you are having problems. It's also a good idea to know your test results and keep alist of the medicines you take. How can you care for yourself at home?  Be safe with medicines. Take your medicines exactly as prescribed. Call your doctor if you think you are having a problem with your medicine.    During your allergy season, keep windows closed. If you need to use air-conditioning, change or clean all filters every month. Take a shower and change your clothes after you have been outside.  Stay inside when pollen counts are high. Vacuum once or twice a week. Use a vacuum  with a HEPA filter or a double-thickness filter. When should you call for help? Give an epinephrine shot if:     You think you are having a severe allergic reaction. After giving an epinephrine shot, call 911, even if you feel better. Call 911 if:     You have symptoms of a severe allergic reaction. These may include:  ? Sudden raised, red areas (hives) all over your body. ? Swelling of the throat, mouth, lips, or tongue. ? Trouble breathing. ? Passing out (losing consciousness). Or you may feel very lightheaded or suddenly feel weak, confused, or restless.      You have been given an epinephrine shot, even if you feel better. Call your doctor now or seek immediate medical care if:     You have symptoms of an allergic reaction, such as:  ? A rash or hives (raised, red areas on the skin). ? Itching. ? Swelling. ? Belly pain, nausea, or vomiting. Watch closely for changes in your health, and be sure to contact your doctor if:     You do not get better as expected. Where can you learn more? Go to https://[employer].WOWIO. org and sign in to your [facility] account. Enter J912 in the [facility] box to learn more about \"Seasonal Allergies: Care Instructions. \"     If you do not have an account, please click on the \"Sign Up Now\" link. Current as of: February 10, 2021               Content Version: 13.2  © 7986-1840 Healthwise, [employer]. Care instructions adapted under license by [facility] ([facility]). If you have questions about a medical condition or this instruction, always ask your healthcare professional. [address] any warranty or liability for your use of this information.

## 2022-05-04 NOTE — PROGRESS NOTES
[address]  [address] [facility] [address] [address]  Dept: [phone]  Dept Fax: [phone]  Loc: [phone]    Johnny Garcia is a 22 y.o. female who presents today for her medical conditions/complaints as noted below. Johnny Garcia is c/o of Cough and Congestion        HPI:     HPI  Maryann Ashford presents with complaints of cough, congestion and fatigue over the last several days. She reports she was seen here 4/22 and placed on flonase and zyrtec. Her symptoms resolved and she stopped taking the medications. She reports that the symptoms gradually returned over the last few days. She denies fever, headache, sore throat and ear pain. She denies recent covid19 infection.      Past Medical History:   Diagnosis Date    Genital herpes      Past Surgical History:   Procedure Laterality Date    EXTERNAL EAR SURGERY Left        Family History   Problem Relation Age of Onset    Other Mother         Irregular menses    Diabetes Maternal Grandmother     Cancer Maternal Grandfather        Social History     Tobacco Use    Smoking status: Never Smoker    Smokeless tobacco: Never Used   Substance Use Topics    Alcohol use: No     Alcohol/week: 0.0 standard drinks      Current Outpatient Medications   Medication Sig Dispense Refill    valACYclovir (VALTREX) 500 MG tablet Take 1 tablet by mouth daily 30 tablet 11    norgestimate-ethinyl estradiol (ESTARYLLA) 0.25-35 MG-MCG per tablet TAKE 1 TABLET BY MOUTH EVERY DAY 28 tablet 11    ondansetron (ZOFRAN ODT) 4 MG disintegrating tablet Take 1 tablet by mouth every 8 hours as needed for Nausea or Vomiting (Patient not taking: Reported on 4/22/2022) 10 tablet 0     Current Facility-Administered Medications   Medication Dose Route Frequency Provider Last Rate Last Admin    dexamethasone (DECADRON) injection 10 mg  10 mg IntraMUSCular Once BETH Kelly - CNP         No Known Allergies    Health Maintenance   Topic Date Due    COVID-19 Vaccine (1) Never done    Hepatitis C screen  Never done    Depression Screen  12/21/2021    Chlamydia screen  07/26/2022    Flu vaccine (Season Ended) 09/01/2022    DTaP/Tdap/Td vaccine (5 - Td or Tdap) 02/26/2029    Hepatitis A vaccine  Completed    Hepatitis B vaccine  Completed    Hib vaccine  Completed    HPV vaccine  Completed    Varicella vaccine  Completed    Meningococcal (ACWY) vaccine  Completed    HIV screen  Completed    Pneumococcal 0-64 years Vaccine  Aged Out       Subjective:     Review of Systems   Constitutional: Positive for fatigue. Negative for fever. HENT: Positive for congestion. Negative for ear pain and sore throat. Respiratory: Positive for cough. Neurological: Negative for headaches.       :Objective      Physical Exam  Constitutional:       General: She is not in acute distress. Appearance: Normal appearance. She is not toxic-appearing. HENT:      Head: Normocephalic and atraumatic. Right Ear: Tympanic membrane, ear canal and external ear normal.      Left Ear: Tympanic membrane, ear canal and external ear normal.      Nose: Rhinorrhea present. Mouth/Throat:      Mouth: Mucous membranes are moist.      Pharynx: Oropharynx is clear. Posterior oropharyngeal erythema present. No oropharyngeal exudate. Comments: Cobblestoning noted to posterior oropharynx   Eyes:      General:         Right eye: No discharge. Left eye: No discharge. Conjunctiva/sclera: Conjunctivae normal.   Cardiovascular:      Rate and Rhythm: Normal rate and regular rhythm. Pulmonary:      Effort: Pulmonary effort is normal. No respiratory distress. Breath sounds: Normal breath sounds. Abdominal:      General: Abdomen is flat. Palpations: Abdomen is soft. Musculoskeletal:         General: Normal range of motion. Cervical back: Normal range of motion.    Lymphadenopathy:      Cervical: No cervical adenopathy. Skin:     General: Skin is warm and dry. Capillary Refill: Capillary refill takes less than 2 seconds. Findings: No rash. Neurological:      General: No focal deficit present. Mental Status: She is alert. Psychiatric:         Mood and Affect: Mood normal.       /60   Pulse 78   Temp 98.2 °F (36.8 °C)   Resp 18   Ht 4' 11\" (1.499 m)   Wt 106 lb 12.8 oz (48.4 kg)   LMP 04/13/2022   SpO2 99%   BMI 21.57 kg/m²     :Assessment       Diagnosis Orders   1. Allergic rhinitis, unspecified seasonality, unspecified trigger  dexamethasone (DECADRON) injection 10 mg       :Plan    No orders of the defined types were placed in this encounter. Likely seasonal allergic rhinitis. Dex injection for symptomatic support, resume flonase and zyrtec. Return precautions and home care education completed. Patient verbalized understanding. Return if symptoms worsen or fail to improve. Orders Placed This Encounter   Medications    dexamethasone (DECADRON) injection 10 mg       Patient given educational materials- see patient instructions. Discussed use, benefit, and side effects of prescribed medications. All patient questions answered. Pt voiced understanding. Patient Instructions     1. Steroid shot in office today, avoid steroids for the next 3 months to avoid risk of brittle bones  2. Continue flonase and zyrtec daily. 3. Monitor for new fever, headache or worsening symptoms and return if they occur. Patient Education        Seasonal Allergies: Care Instructions  Your Care Instructions     Allergies occur when your body's defense system (immune system) overreacts to certain substances. The immune system treats a harmless substance as if it were a harmful germ or virus. Many things can cause this to happen. Examples includepollens, medicine, food, dust, animal dander, and mold. Your allergies are seasonal if you have symptoms just at certain times of the year.  In that case, you are probably allergic to pollens from certain trees,grasses, or weeds. Allergies can be mild or severe. Over-the-counter allergy medicine may helpwith some symptoms. Read and follow all instructions on the label. Managing your allergies is an important part of staying healthy. Your doctor may suggest that you have tests to help find the cause of your allergies. When you know what things trigger your symptoms, you can avoid them. This canprevent allergy symptoms and other health problems. In some cases, immunotherapy might help. For this treatment, you get shots or use pills that have a small amount of certain allergens in them. Your body \"gets used to\" the allergen, so you react less to it over time. This kind oftreatment may help prevent or reduce some allergy symptoms. Follow-up care is a key part of your treatment and safety. Be sure to make and go to all appointments, and call your doctor if you are having problems. It's also a good idea to know your test results and keep alist of the medicines you take. How can you care for yourself at home?  Be safe with medicines. Take your medicines exactly as prescribed. Call your doctor if you think you are having a problem with your medicine.  During your allergy season, keep windows closed. If you need to use air-conditioning, change or clean all filters every month. Take a shower and change your clothes after you have been outside.  Stay inside when pollen counts are high. Vacuum once or twice a week. Use a vacuum  with a HEPA filter or a double-thickness filter. When should you call for help? Give an epinephrine shot if:     You think you are having a severe allergic reaction. After giving an epinephrine shot, call 911, even if you feel better. Call 911 if:     You have symptoms of a severe allergic reaction. These may include:  ? Sudden raised, red areas (hives) all over your body. ?  Swelling of the throat, mouth, lips, or tongue. ? Trouble breathing. ? Passing out (losing consciousness). Or you may feel very lightheaded or suddenly feel weak, confused, or restless.      You have been given an epinephrine shot, even if you feel better. Call your doctor now or seek immediate medical care if:     You have symptoms of an allergic reaction, such as:  ? A rash or hives (raised, red areas on the skin). ? Itching. ? Swelling. ? Belly pain, nausea, or vomiting. Watch closely for changes in your health, and be sure to contact your doctor if:     You do not get better as expected. Where can you learn more? Go to https://fsboWOW.Anyone Home. org and sign in to your [employer] account. Enter J912 in the [employer] box to learn more about \"Seasonal Allergies: Care Instructions. \"     If you do not have an account, please click on the \"Sign Up Now\" link. Current as of: February 10, 2021               Content Version: 13.2  © 2006-2022 Healthwise, Incorporated. Care instructions adapted under license by [facility] ([facility]). If you have questions about a medical condition or this instruction, always ask your healthcare professional. [place] [address] any warranty or liability for your use of this information.                Electronically signed by BETH Pedraza CNP on 5/4/2022 at 3:01 PM

## 2022-06-01 ENCOUNTER — PATIENT MESSAGE (OUTPATIENT)
Dept: OBGYN CLINIC | Age: 22
End: 2022-06-01

## 2022-06-02 RX ORDER — VALACYCLOVIR HYDROCHLORIDE 1 G/1
1000 TABLET, FILM COATED ORAL DAILY
Qty: 5 TABLET | Refills: 0 | Status: SHIPPED | OUTPATIENT
Start: 2022-06-02 | End: 2022-06-07

## 2022-07-11 ENCOUNTER — OFFICE VISIT (OUTPATIENT)
Dept: OBGYN CLINIC | Age: 22
End: 2022-07-11
Payer: COMMERCIAL

## 2022-07-11 VITALS
WEIGHT: 105 LBS | BODY MASS INDEX: 21.17 KG/M2 | SYSTOLIC BLOOD PRESSURE: 129 MMHG | DIASTOLIC BLOOD PRESSURE: 73 MMHG | HEIGHT: 59 IN | HEART RATE: 74 BPM

## 2022-07-11 DIAGNOSIS — A60.00 GENITAL HERPES SIMPLEX, UNSPECIFIED SITE: ICD-10-CM

## 2022-07-11 DIAGNOSIS — N89.8 VAGINAL DISCHARGE: ICD-10-CM

## 2022-07-11 DIAGNOSIS — N89.8 VAGINAL ODOR: ICD-10-CM

## 2022-07-11 DIAGNOSIS — N89.8 VAGINAL ODOR: Primary | ICD-10-CM

## 2022-07-11 LAB
BACTERIAL VAGINOSIS: DETECTED
CANDIDA GLABRATA: NOT DETECTED
CANDIDA KRUSEI: NOT DETECTED
CANDIDA SPP: NOT DETECTED
TRICHOMONAS VAGINALIS: NOT DETECTED

## 2022-07-11 PROCEDURE — 99214 OFFICE O/P EST MOD 30 MIN: CPT | Performed by: NURSE PRACTITIONER

## 2022-07-11 PROCEDURE — 1036F TOBACCO NON-USER: CPT | Performed by: NURSE PRACTITIONER

## 2022-07-11 PROCEDURE — G8427 DOCREV CUR MEDS BY ELIG CLIN: HCPCS | Performed by: NURSE PRACTITIONER

## 2022-07-11 PROCEDURE — G8420 CALC BMI NORM PARAMETERS: HCPCS | Performed by: NURSE PRACTITIONER

## 2022-07-11 RX ORDER — METRONIDAZOLE 500 MG/1
500 TABLET ORAL 2 TIMES DAILY
Qty: 14 TABLET | Refills: 0 | Status: SHIPPED | OUTPATIENT
Start: 2022-07-11 | End: 2022-07-18

## 2022-07-11 RX ORDER — VALACYCLOVIR HYDROCHLORIDE 500 MG/1
500 TABLET, FILM COATED ORAL DAILY
Qty: 30 TABLET | Refills: 11 | Status: SHIPPED | OUTPATIENT
Start: 2022-07-11

## 2022-07-11 ASSESSMENT — ENCOUNTER SYMPTOMS
GASTROINTESTINAL NEGATIVE: 1
EYES NEGATIVE: 1
DIARRHEA: 0
CONSTIPATION: 0
RESPIRATORY NEGATIVE: 1
ALLERGIC/IMMUNOLOGIC NEGATIVE: 1

## 2022-07-11 NOTE — PROGRESS NOTES
Pt states she is having vaginal odor/white discharge gets recurrent bv and this has been going on since last week.

## 2022-07-11 NOTE — PATIENT INSTRUCTIONS
Patient Education        Bacterial Vaginosis: Care Instructions  Overview     Bacterial vaginosis is a type of vaginal infection. It is caused by excess growth of certain bacteria that are normally found in the vagina. Symptoms can include itching, swelling, pain when you urinate or have sex, and a gray or yellow discharge with a \"fishy\" odor. It is not considered an infection that isspread through sexual contact. Symptoms can be annoying and uncomfortable. But bacterial vaginosis does not usually cause other health problems. However, if you have it while you arepregnant, it can cause complications. While the infection may go away on its own, most doctors use antibiotics to treat it. You may have been prescribed pills or vaginal cream. With treatment,bacterial vaginosis usually clears up in 5 to 7 days. Follow-up care is a key part of your treatment and safety. Be sure to make and go to all appointments, and call your doctor if you are having problems. It's also a good idea to know your test results and keep alist of the medicines you take. How can you care for yourself at home?  Take your antibiotics as directed. Do not stop taking them just because you feel better. You need to take the full course of antibiotics.  Do not eat or drink anything that contains alcohol if you are taking metronidazole or tinidazole.  Keep using your medicine if you start your period. Use pads instead of tampons while using a vaginal cream or suppository. Tampons can absorb the medicine.  Wear loose cotton clothing. Do not wear nylon and other materials that hold body heat and moisture close to the skin.  Do not scratch. Relieve itching with a cold pack or a cool bath.  Do not wash your vaginal area more than once a day. Use plain water or a mild, unscented soap. Do not douche. When should you call for help?   Watch closely for changes in your health, and be sure to contact your doctor if:     You have unexpected vaginal bleeding.      You have a fever.      You have new or increased pain in your vagina or pelvis.      You are not getting better after 1 week.      Your symptoms return after you finish the course of your medicine. Where can you learn more? Go to [email or web address] and sign in to your [facility] account. Enter I595 in the [employer] box to learn more about \"Bacterial Vaginosis: Care Instructions. \"     If you do not have an account, please click on the \"Sign Up Now\" link. Current as of: November 22, 2021               Content Version: 13.3  © 3434-6734 [employer], [employer]. Care instructions adapted under license by [facility] ([facility]). If you have questions about a medical condition or this instruction, always ask your healthcare professional. [address] any warranty or liability for your use of this information.

## 2022-07-11 NOTE — PROGRESS NOTES
Og Dior is a 22 y.o. female who presents today for her medical conditions/ complaints as noted below. Og Dior is c/o of Vaginal Odor and Vaginal Discharge        HPI  Pt presents c/o increase in vaginal discharge and odor. Seems to happen every time she has sex. Feels like she gets recurrent BV for over a year now. Taking OCP and also using condoms. Pees after sex, wipes front to back. Avoids thong underwear. Also needing refill on Valtrex. Patient's last menstrual period was 2022 (approximate).     Past Medical History:   Diagnosis Date    Genital herpes      Past Surgical History:   Procedure Laterality Date    EXTERNAL EAR SURGERY Left      Family History   Problem Relation Age of Onset    Other Mother         Irregular menses    Diabetes Maternal Grandmother     Cancer Maternal Grandfather      Social History     Tobacco Use    Smoking status: Never Smoker    Smokeless tobacco: Never Used   Substance Use Topics    Alcohol use: No     Alcohol/week: 0.0 standard drinks       Current Outpatient Medications   Medication Sig Dispense Refill    valACYclovir (VALTREX) 500 MG tablet Take 1 tablet by mouth daily 30 tablet 11    metroNIDAZOLE (FLAGYL) 500 MG tablet Take 1 tablet by mouth 2 times daily for 7 days 14 tablet 0    norgestimate-ethinyl estradiol (ESTARYLLA) 0.25-35 MG-MCG per tablet TAKE 1 TABLET BY MOUTH EVERY DAY 28 tablet 11    ondansetron (ZOFRAN ODT) 4 MG disintegrating tablet Take 1 tablet by mouth every 8 hours as needed for Nausea or Vomiting 10 tablet 0     No current facility-administered medications for this visit. No Known Allergies  Vitals:    22 1022   BP: 129/73   Pulse: 74     Body mass index is 21.21 kg/m². Review of Systems   Constitutional: Negative. HENT: Negative. Eyes: Negative. Respiratory: Negative. Cardiovascular: Negative. Gastrointestinal: Negative. Negative for constipation and diarrhea. Endocrine: Negative. Genitourinary: Positive for vaginal discharge (odor). Negative for frequency, genital sores, menstrual problem and urgency. Musculoskeletal: Negative. Skin: Negative. Allergic/Immunologic: Negative. Neurological: Negative. Hematological: Negative. Psychiatric/Behavioral: Negative. All other systems reviewed and are negative. Physical Exam  Vitals and nursing note reviewed. Constitutional:       Appearance: She is well-developed. HENT:      Head: Normocephalic. Right Ear: External ear normal.      Left Ear: External ear normal.      Nose: Nose normal.   Genitourinary:     General: Normal vulva. Vagina: Vaginal discharge (thin white) present. No erythema or tenderness. Cervix: No cervical motion tenderness or friability. Uterus: Normal.       Adnexa:         Right: No mass or tenderness. Left: No mass or tenderness. Comments: Swab collected  Musculoskeletal:         General: Normal range of motion. Cervical back: Normal range of motion. Skin:     General: Skin is warm and dry. Neurological:      Mental Status: She is alert and oriented to person, place, and time. Psychiatric:         Attention and Perception: Attention normal.         Mood and Affect: Mood normal.         Speech: Speech normal.         Behavior: Behavior normal.         Thought Content: Thought content normal.         Cognition and Memory: Cognition normal.         Judgment: Judgment normal.          Diagnosis Orders   1. Vaginal odor  Chlamydia, Gonorrhea, Trichomoniasis Swab    Vaginitis Panel PCR   2. Vaginal discharge  Chlamydia, Gonorrhea, Trichomoniasis Swab    Vaginitis Panel PCR   3.  Genital herpes simplex, unspecified site         MEDICATIONS:  Orders Placed This Encounter   Medications    valACYclovir (VALTREX) 500 MG tablet     Sig: Take 1 tablet by mouth daily     Dispense:  30 tablet     Refill:  11    metroNIDAZOLE (FLAGYL) 500 MG tablet     Sig: Take 1 tablet by mouth 2 times daily for 7 days     Dispense:  14 tablet     Refill:  0       ORDERS:  Orders Placed This Encounter   Procedures    Chlamydia, Gonorrhea, Trichomoniasis Swab    Vaginitis Panel PCR       PLAN:  Swab pending  +clue cells on wet prep, starting Flagyl  Discussed feminine health probiotics  May need long term BV treatment with Metrogel    There are no Patient Instructions on file for this visit.

## 2022-07-12 LAB
C TRACH DNA GENITAL QL NAA+PROBE: NOT DETECTED
N. GONORRHOEAE DNA: NOT DETECTED
TRICHOMONAS VAGINALIS DNA: NOT DETECTED

## 2022-07-14 RX ORDER — METRONIDAZOLE 7.5 MG/G
GEL VAGINAL
Qty: 1 EACH | Refills: 3 | Status: SHIPPED | OUTPATIENT
Start: 2022-07-14 | End: 2022-07-21

## 2022-09-08 ENCOUNTER — OFFICE VISIT (OUTPATIENT)
Age: 22
End: 2022-09-08
Payer: COMMERCIAL

## 2022-09-08 VITALS
TEMPERATURE: 98.2 F | BODY MASS INDEX: 22.42 KG/M2 | HEIGHT: 58 IN | RESPIRATION RATE: 21 BRPM | HEART RATE: 81 BPM | OXYGEN SATURATION: 98 % | WEIGHT: 106.8 LBS | DIASTOLIC BLOOD PRESSURE: 54 MMHG | SYSTOLIC BLOOD PRESSURE: 108 MMHG

## 2022-09-08 DIAGNOSIS — N89.8 DISCHARGE OF VAGINA: Primary | ICD-10-CM

## 2022-09-08 LAB
APPEARANCE FLUID: CLEAR
BILIRUBIN, POC: ABNORMAL
BLOOD URINE, POC: ABNORMAL
CLARITY, POC: CLEAR
COLOR, POC: YELLOW
GLUCOSE URINE, POC: ABNORMAL
KETONES, POC: ABNORMAL
LEUKOCYTE EST, POC: ABNORMAL
NITRITE, POC: ABNORMAL
PH, POC: 7
PROTEIN, POC: ABNORMAL
SPECIFIC GRAVITY, POC: 1.02
UROBILINOGEN, POC: ABNORMAL

## 2022-09-08 PROCEDURE — 99213 OFFICE O/P EST LOW 20 MIN: CPT | Performed by: NURSE PRACTITIONER

## 2022-09-08 PROCEDURE — G8427 DOCREV CUR MEDS BY ELIG CLIN: HCPCS | Performed by: NURSE PRACTITIONER

## 2022-09-08 PROCEDURE — 1036F TOBACCO NON-USER: CPT | Performed by: NURSE PRACTITIONER

## 2022-09-08 PROCEDURE — 81002 URINALYSIS NONAUTO W/O SCOPE: CPT | Performed by: NURSE PRACTITIONER

## 2022-09-08 PROCEDURE — G8420 CALC BMI NORM PARAMETERS: HCPCS | Performed by: NURSE PRACTITIONER

## 2022-09-08 ASSESSMENT — ENCOUNTER SYMPTOMS
TROUBLE SWALLOWING: 0
ABDOMINAL PAIN: 0
SORE THROAT: 0
EYE DISCHARGE: 0
SHORTNESS OF BREATH: 0
SINUS PRESSURE: 0
CHEST TIGHTNESS: 0
ABDOMINAL DISTENTION: 0
STRIDOR: 0
COUGH: 0
EYE PAIN: 0
WHEEZING: 0
COLOR CHANGE: 0

## 2022-09-08 NOTE — PROGRESS NOTES
[id number]  [address] [facility] [address] [id number]  Dept: [phone]  Dept Fax: [phone]  Loc: [phone]    Amadou Rojas is a 22 y.o. female who presents today for her medical conditions/complaints as noted below. Amadou Rojas is complaining of Vaginal Discharge (Has different textures to the discharge and is different colors and has had this all for around a week)        HPI:   Vaginal Discharge  The patient's primary symptoms include vaginal discharge. Pertinent negatives include no abdominal pain, chills, dysuria, fever, hematuria, rash or sore throat. Brain Crumbly presents today complaining of vaginal discharge has been going on a couple of days. She describes the discharge as thick and yellow. Discharge started after being sexually active with a new partner. She denies any itching, odor, or burning. Past Medical History:   Diagnosis Date    Genital herpes        Past Surgical History:   Procedure Laterality Date    EXTERNAL EAR SURGERY Left        Family History   Problem Relation Age of Onset    Other Mother         Irregular menses    Diabetes Maternal Grandmother     Cancer Maternal Grandfather        Social History     Tobacco Use    Smoking status: Never    Smokeless tobacco: Never   Substance Use Topics    Alcohol use: No     Alcohol/week: 0.0 standard drinks        Current Outpatient Medications   Medication Sig Dispense Refill    valACYclovir (VALTREX) 500 MG tablet Take 1 tablet by mouth daily 30 tablet 11    norgestimate-ethinyl estradiol (ESTARYLLA) 0.25-35 MG-MCG per tablet TAKE 1 TABLET BY MOUTH EVERY DAY 28 tablet 11    ondansetron (ZOFRAN ODT) 4 MG disintegrating tablet Take 1 tablet by mouth every 8 hours as needed for Nausea or Vomiting 10 tablet 0     No current facility-administered medications for this visit.        No Known Allergies    Health Maintenance   Topic Date Due    COVID-19 Vaccine (1) Never done Hepatitis C screen  Never done    Depression Screen  12/21/2021    Flu vaccine (1) 09/01/2022    Chlamydia screen  07/11/2023    DTaP/Tdap/Td vaccine (5 - Td or Tdap) 02/26/2029    Hepatitis A vaccine  Completed    Hepatitis B vaccine  Completed    Hib vaccine  Completed    HPV vaccine  Completed    Varicella vaccine  Completed    Meningococcal (ACWY) vaccine  Completed    HIV screen  Completed    Pneumococcal 0-64 years Vaccine  Aged Out       Subjective:   Review of Systems   Constitutional:  Negative for chills, fatigue and fever. HENT:  Negative for congestion, sinus pressure, sore throat and trouble swallowing. Eyes:  Negative for pain and discharge. Respiratory:  Negative for cough, chest tightness, shortness of breath, wheezing and stridor. Cardiovascular:  Negative for chest pain and palpitations. Gastrointestinal:  Negative for abdominal distention and abdominal pain. Genitourinary:  Positive for vaginal discharge. Negative for difficulty urinating, dysuria and hematuria. Musculoskeletal:  Negative for arthralgias, neck pain and neck stiffness. Skin:  Negative for color change and rash. Neurological:  Negative for dizziness, syncope, speech difficulty, weakness and numbness. Psychiatric/Behavioral:  Negative for confusion and suicidal ideas. Objective    Physical Exam  Vitals and nursing note reviewed. Constitutional:       General: She is not in acute distress. Appearance: Normal appearance. HENT:      Head: Normocephalic. Right Ear: External ear normal.      Left Ear: External ear normal.      Nose: Nose normal. No congestion. Mouth/Throat:      Mouth: Mucous membranes are moist.      Pharynx: Oropharynx is clear. No posterior oropharyngeal erythema. Eyes:      Conjunctiva/sclera: Conjunctivae normal.      Pupils: Pupils are equal, round, and reactive to light. Cardiovascular:      Rate and Rhythm: Normal rate and regular rhythm. Pulses: Normal pulses. Heart sounds: Normal heart sounds. No murmur heard. Pulmonary:      Effort: Pulmonary effort is normal. No respiratory distress. Breath sounds: Normal breath sounds. No stridor. No wheezing. Abdominal:      General: Abdomen is flat. Bowel sounds are normal. There is no distension. Tenderness: There is no abdominal tenderness. Musculoskeletal:         General: No swelling or deformity. Normal range of motion. Cervical back: Normal range of motion. No rigidity or tenderness. Skin:     General: Skin is warm and dry. Findings: No rash. Neurological:      General: No focal deficit present. Mental Status: She is alert and oriented to person, place, and time. Sensory: No sensory deficit. BP (!) 108/54   Pulse 81   Temp 98.2 °F (36.8 °C)   Resp 21   Ht 4' 10\" (1.473 m)   Wt 106 lb 12.8 oz (48.4 kg)   SpO2 98%   BMI 22.32 kg/m²     Assessment         Diagnosis Orders   1. Discharge of vagina  POCT Urinalysis no Micro    Miscellaneous Sendout          Plan   UA showed trace leukocytes. Running Diatherix to check for BV, yeast, and STDs, will call with results  No sex until results are back and fully treated. Educated on worrisome signs and when to go to ER. Patient verbalized understanding and agrees to treatment plan. Orders Placed This Encounter   Procedures    Miscellaneous Sendout     Standing Status:   Future     Standing Expiration Date:   9/8/2023     Order Specific Question:   Specify Req. Test (1 Test/Order)     Answer:   Diatherix STI plus yeast    POCT Urinalysis no Micro       Results for orders placed or performed in visit on 09/08/22   POCT Urinalysis no Micro   Result Value Ref Range    Color, UA yellow     Clarity, UA clear     Glucose, UA POC neg     Bilirubin, UA neg     Ketones, UA neg     Spec Grav, UA 1.025     Blood, UA POC neg     pH, UA 7.0     Protein, UA POC neg     Urobilinogen, UA 0.2. E. U/dl     Leukocytes, UA trace     Nitrite, UA neg     Appearance, Fluid Clear Clear, Slightly Cloudy       No orders of the defined types were placed in this encounter. New Prescriptions    No medications on file        Return if symptoms worsen or fail to improve. Discussed use, benefits, and side effects of any prescribed medications. All patient questions were answered. Patient voiced understanding of care plan. Patient was given educational materials - see patient instructions below. Patient Instructions   UA showed trace leukocytes. Running Diatherix to check for BV, yeast, and STDs, will call with results  No sex until results are back and fully treated. Educated on worrisome signs and when to go to ER. Patient verbalized understanding and agrees to treatment plan.       Electronically signed by BETH Kendlal CNP on 9/8/2022 at 1:30 PM

## 2022-09-08 NOTE — PATIENT INSTRUCTIONS
UA showed trace leukocytes. Running Diatherix to check for BV, yeast, and STDs, will call with results  No sex until results are back and fully treated. Educated on worrisome signs and when to go to ER. Patient verbalized understanding and agrees to treatment plan.

## 2022-09-09 DIAGNOSIS — A74.9 CHLAMYDIA: Primary | ICD-10-CM

## 2022-09-09 DIAGNOSIS — N89.8 DISCHARGE OF VAGINA: ICD-10-CM

## 2022-09-09 DIAGNOSIS — B37.31 VAGINAL CANDIDIASIS: ICD-10-CM

## 2022-09-09 RX ORDER — FLUCONAZOLE 150 MG/1
150 TABLET ORAL
Qty: 2 TABLET | Refills: 0 | Status: SHIPPED | OUTPATIENT
Start: 2022-09-09 | End: 2022-09-15

## 2022-09-09 RX ORDER — DOXYCYCLINE HYCLATE 100 MG
100 TABLET ORAL 2 TIMES DAILY
Qty: 20 TABLET | Refills: 0 | Status: SHIPPED | OUTPATIENT
Start: 2022-09-09 | End: 2022-09-19

## 2022-09-11 ENCOUNTER — OFFICE VISIT (OUTPATIENT)
Age: 22
End: 2022-09-11
Payer: COMMERCIAL

## 2022-09-11 DIAGNOSIS — A74.9 CHLAMYDIA INFECTION: Primary | ICD-10-CM

## 2022-09-11 PROCEDURE — 96372 THER/PROPH/DIAG INJ SC/IM: CPT | Performed by: NURSE PRACTITIONER

## 2022-09-11 RX ORDER — CEFTRIAXONE 500 MG/1
500 INJECTION, POWDER, FOR SOLUTION INTRAMUSCULAR; INTRAVENOUS ONCE
Status: COMPLETED | OUTPATIENT
Start: 2022-09-11 | End: 2022-09-11

## 2022-09-11 RX ADMIN — CEFTRIAXONE 500 MG: 500 INJECTION, POWDER, FOR SOLUTION INTRAMUSCULAR; INTRAVENOUS at 09:54

## 2022-09-11 NOTE — PROGRESS NOTES
Patient presents to office for treatment of Chlamydia that was found on her Diatherix testing. Rocephin 500mg IM ordered. Another provider has already sent in prescriptions to pharmacy to treat other findings on Diatherix testing.

## 2022-11-29 ENCOUNTER — OFFICE VISIT (OUTPATIENT)
Age: 22
End: 2022-11-29
Payer: COMMERCIAL

## 2022-11-29 VITALS
SYSTOLIC BLOOD PRESSURE: 120 MMHG | OXYGEN SATURATION: 99 % | HEIGHT: 59 IN | BODY MASS INDEX: 21.57 KG/M2 | WEIGHT: 107 LBS | TEMPERATURE: 97.8 F | HEART RATE: 53 BPM | RESPIRATION RATE: 18 BRPM | DIASTOLIC BLOOD PRESSURE: 70 MMHG

## 2022-11-29 DIAGNOSIS — N89.8 VAGINAL DISCHARGE: Primary | ICD-10-CM

## 2022-11-29 PROCEDURE — G8420 CALC BMI NORM PARAMETERS: HCPCS | Performed by: NURSE PRACTITIONER

## 2022-11-29 PROCEDURE — G8427 DOCREV CUR MEDS BY ELIG CLIN: HCPCS | Performed by: NURSE PRACTITIONER

## 2022-11-29 PROCEDURE — 99212 OFFICE O/P EST SF 10 MIN: CPT | Performed by: NURSE PRACTITIONER

## 2022-11-29 PROCEDURE — G8484 FLU IMMUNIZE NO ADMIN: HCPCS | Performed by: NURSE PRACTITIONER

## 2022-11-29 PROCEDURE — 1036F TOBACCO NON-USER: CPT | Performed by: NURSE PRACTITIONER

## 2022-11-29 NOTE — PATIENT INSTRUCTIONS
You will receive a phone call with your STD test results. If another treatment is needed you will be notified at that time. No sexual intercourse for the next 2-3 weeks. If you have a positive STD result you must notify all sexual partners as they must receive treatment also. Always practice safe sex.

## 2022-11-29 NOTE — PROGRESS NOTES
Lauren Deleon (:  2000) is a 25 y.o. female,Established patient, here for evaluation of the following chief complaint(s):  Vaginal Discharge (Pt c/o white clumpy discharge and irritation x3days, also wants STD testing if possible/)    Patient presents today complaining of white chunky vaginal discharge that began 3 days ago. Denies fever, abdominal pain, flank pain, vaginal odor, or dysuria. She states she did have some vaginal labial irritation yesterday, but that has resolved today. Discussed with patient I will order Diatherix testing as in the past.  She will be notified of test results and treatment that is needed. All questions answered. Patient verbalized understanding. Care instructions discussed. Patient verbalized understanding and agrees to plan of care. ASSESSMENT/PLAN:  1. Vaginal discharge   No orders of the defined types were placed in this encounter. Return if symptoms worsen or fail to improve. Subjective   SUBJECTIVE/OBJECTIVE:  Vaginal Discharge  The patient's primary symptoms include vaginal discharge. The patient's pertinent negatives include no pelvic pain. Pertinent negatives include no dysuria, flank pain, frequency or urgency. Review of Systems   Genitourinary:  Positive for vaginal discharge. Negative for difficulty urinating, dysuria, flank pain, frequency, pelvic pain and urgency. Objective   Physical Exam  Vitals reviewed. Abdominal:      Palpations: Abdomen is soft. Tenderness: There is no abdominal tenderness. There is no right CVA tenderness, left CVA tenderness or guarding. Skin:     General: Skin is warm and dry. Neurological:      Mental Status: She is alert. Patient Instructions     You will receive a phone call with your STD test results. If another treatment is needed you will be notified at that time. No sexual intercourse for the next 2-3 weeks.     If you have a positive STD result you must notify all sexual partners as they must receive treatment also. Always practice safe sex. An electronic signature was used to authenticate this note. --BETH Candelaria - CNP     EMR Dragon/translation disclaimer: Much of this encounter note is an electronic transcription/translation of spoken language to printed text. The electronic translation of spoken language may be erroneous, or at times, nonsensical words or phrases may be inadvertently transcribed.   Although I have reviewed the note for such errors, some may still exist.

## 2022-11-30 DIAGNOSIS — A59.9 TRICHOMONAS VAGINALIS INFECTION: Primary | ICD-10-CM

## 2022-11-30 RX ORDER — METRONIDAZOLE 500 MG/1
500 TABLET ORAL 2 TIMES DAILY
Qty: 14 TABLET | Refills: 0 | Status: SHIPPED | OUTPATIENT
Start: 2022-11-30 | End: 2022-12-07

## 2022-11-30 NOTE — PROGRESS NOTES
Diatherix returned positive for Trichomonas, atopobium vaginae and gardnerella vaginalis . I have sent in flagyl to [facility] . She needs to avoid alcohol while on the flagyl and for a few days after finishing it. She needs to avoid sex until finishing the medication. Partner needs to get treated.

## 2022-12-20 ENCOUNTER — OFFICE VISIT (OUTPATIENT)
Age: 22
End: 2022-12-20
Payer: COMMERCIAL

## 2022-12-20 VITALS
BODY MASS INDEX: 21.37 KG/M2 | RESPIRATION RATE: 18 BRPM | TEMPERATURE: 97.5 F | DIASTOLIC BLOOD PRESSURE: 60 MMHG | HEART RATE: 68 BPM | OXYGEN SATURATION: 100 % | SYSTOLIC BLOOD PRESSURE: 102 MMHG | WEIGHT: 106 LBS | HEIGHT: 59 IN

## 2022-12-20 DIAGNOSIS — B95.8 STAPHYLOCOCCAL INFECTION OF SKIN: Primary | ICD-10-CM

## 2022-12-20 DIAGNOSIS — L08.9 STAPHYLOCOCCAL INFECTION OF SKIN: Primary | ICD-10-CM

## 2022-12-20 PROCEDURE — 99213 OFFICE O/P EST LOW 20 MIN: CPT | Performed by: NURSE PRACTITIONER

## 2022-12-20 PROCEDURE — 1036F TOBACCO NON-USER: CPT | Performed by: NURSE PRACTITIONER

## 2022-12-20 PROCEDURE — G8420 CALC BMI NORM PARAMETERS: HCPCS | Performed by: NURSE PRACTITIONER

## 2022-12-20 PROCEDURE — G8427 DOCREV CUR MEDS BY ELIG CLIN: HCPCS | Performed by: NURSE PRACTITIONER

## 2022-12-20 PROCEDURE — G8484 FLU IMMUNIZE NO ADMIN: HCPCS | Performed by: NURSE PRACTITIONER

## 2022-12-20 RX ORDER — SULFAMETHOXAZOLE AND TRIMETHOPRIM 800; 160 MG/1; MG/1
1 TABLET ORAL 2 TIMES DAILY
Qty: 20 TABLET | Refills: 0 | Status: SHIPPED | OUTPATIENT
Start: 2022-12-20 | End: 2022-12-30

## 2022-12-20 ASSESSMENT — ENCOUNTER SYMPTOMS
RESPIRATORY NEGATIVE: 1
NAUSEA: 0
ALLERGIC/IMMUNOLOGIC NEGATIVE: 1
ABDOMINAL PAIN: 0
VOMITING: 0
DIARRHEA: 0
COUGH: 0

## 2022-12-20 ASSESSMENT — VISUAL ACUITY: OU: 1

## 2022-12-20 NOTE — PROGRESS NOTES
[address]  [address] [facility] [address] [address]  Dept: [phone]  Dept Fax: [phone]  Loc: [phone]    Annie Roman is a 25 y.o. female who presents today for her medical conditions/complaintsas noted below. Annie Roman is c/o of Rash (RASH (BREAK OUT) LEFT SIDE OF NECK STARTED 2 DAYS JUST GETTING WORSE)        HPI:     Rash  This is a new problem. The current episode started in the past 7 days (2 days ago). The affected locations include the neck (left neck). The rash is characterized by redness and itchiness. It is unknown if there was an exposure to a precipitant. Pertinent negatives include no cough, diarrhea, fatigue, fever or vomiting. Past treatments include anti-itch cream. The treatment provided mild relief. Daysi Ashwini tells me she wore a new necklace a few days ago and her neck began itching and now has little pustular bumps on the side of her neck that are tender to touch. She applied some witch hazel and anti itch cream with little relief. She did notice she scratched herself and left a small open area around where her neck was itching.   Past Medical History:   Diagnosis Date    Genital herpes      Past Surgical History:   Procedure Laterality Date    EXTERNAL EAR SURGERY Left        Family History   Problem Relation Age of Onset    Other Mother         Irregular menses    Diabetes Maternal Grandmother     Cancer Maternal Grandfather        Social History     Tobacco Use    Smoking status: Never    Smokeless tobacco: Never   Substance Use Topics    Alcohol use: No     Alcohol/week: 0.0 standard drinks      Current Outpatient Medications   Medication Sig Dispense Refill    sulfamethoxazole-trimethoprim (BACTRIM DS;SEPTRA DS) 800-160 MG per tablet Take 1 tablet by mouth 2 times daily for 10 days 20 tablet 0    mupirocin (BACTROBAN) 2 % ointment Apply topically 3 times daily to left side of neck for 7 days 1 each 0 valACYclovir (VALTREX) 500 MG tablet Take 1 tablet by mouth daily 30 tablet 11    norgestimate-ethinyl estradiol (ESTARYLLA) 0.25-35 MG-MCG per tablet TAKE 1 TABLET BY MOUTH EVERY DAY (Patient not taking: Reported on 11/29/2022) 28 tablet 11    ondansetron (ZOFRAN ODT) 4 MG disintegrating tablet Take 1 tablet by mouth every 8 hours as needed for Nausea or Vomiting (Patient not taking: Reported on 11/29/2022) 10 tablet 0     No current facility-administered medications for this visit. No Known Allergies    Health Maintenance   Topic Date Due    COVID-19 Vaccine (1) Never done    Depression Screen  Never done    Hepatitis C screen  Never done    Flu vaccine (1) 08/01/2022    Pap smear  Never done    Chlamydia/GC screen  07/11/2023    DTaP/Tdap/Td vaccine (5 - Td or Tdap) 02/26/2029    Hepatitis A vaccine  Completed    Hib vaccine  Completed    HPV vaccine  Completed    Varicella vaccine  Completed    Meningococcal (ACWY) vaccine  Completed    HIV screen  Completed    Pneumococcal 0-64 years Vaccine  Aged Out       Subjective:     Review of Systems   Constitutional:  Negative for activity change, appetite change, chills, fatigue and fever. HENT: Negative. Respiratory: Negative. Negative for cough. Gastrointestinal:  Negative for abdominal pain, diarrhea, nausea and vomiting. Musculoskeletal:  Negative for arthralgias and myalgias. Skin:  Positive for rash. Allergic/Immunologic: Negative. Neurological:  Negative for headaches.     :Objective      Physical Exam  Vitals and nursing note reviewed. Constitutional:       General: She is awake. She is not in acute distress. Appearance: Normal appearance. She is well-developed and well-groomed. She is not ill-appearing. HENT:      Head: Normocephalic. Right Ear: Hearing normal.      Left Ear: Hearing normal.      Nose: Nose normal.      Mouth/Throat:      Lips: Pink.       Mouth: Mucous membranes are moist.   Eyes:      General: Lids are normal. Vision grossly intact. Neck:      Trachea: Phonation normal.   Cardiovascular:      Rate and Rhythm: Normal rate and regular rhythm. Heart sounds: Normal heart sounds, S1 normal and S2 normal. No murmur heard. No friction rub. No gallop. Pulmonary:      Effort: Pulmonary effort is normal. No respiratory distress. Breath sounds: Normal breath sounds and air entry. No wheezing, rhonchi or rales. Abdominal:      Palpations: Abdomen is soft. Musculoskeletal:         General: No tenderness or deformity. Normal range of motion. Cervical back: Full passive range of motion without pain and neck supple. Lymphadenopathy:      Head:      Left side of head: Submandibular adenopathy present. Skin:     General: Skin is warm and dry. Capillary Refill: Capillary refill takes less than 2 seconds. Findings: Rash present. Rash is pustular. Comments:  6-8 Small pustular lesions on erythematous base on left side of neck, areas are tender to touch, area is slightly edematous   Neurological:      General: No focal deficit present. Mental Status: She is alert, oriented to person, place, and time and easily aroused. Psychiatric:         Attention and Perception: Attention normal.         Mood and Affect: Mood normal.         Speech: Speech normal.         Behavior: Behavior normal. Behavior is cooperative. /60 (Site: Left Upper Arm)   Pulse 68   Temp 97.5 °F (36.4 °C) (Temporal)   Resp 18   Ht 4' 11\" (1.499 m)   Wt 106 lb (48.1 kg)   SpO2 100%   BMI 21.41 kg/m²     :Assessment       Diagnosis Orders   1. Staphylococcal infection of skin            :Plan    No orders of the defined types were placed in this encounter. Discussed washing area with antibacterial soap and water, do not \"bust\" or poke lesions to open them up. If areas open keep them covered with dry bandage to prevent spread of infection. Use good hand hygiene to prevent spread of infection.  She voiced good understanding. Good hand hygiene  Bactroban ointment to neck as directed   Bactrim as directed  Follow up with PCP or return to Urgent Care for worsening or unresolved symptoms. No follow-ups on file. Orders Placed This Encounter   Medications    sulfamethoxazole-trimethoprim (BACTRIM DS;SEPTRA DS) 800-160 MG per tablet     Sig: Take 1 tablet by mouth 2 times daily for 10 days     Dispense:  20 tablet     Refill:  0    mupirocin (BACTROBAN) 2 % ointment     Sig: Apply topically 3 times daily to left side of neck for 7 days     Dispense:  1 each     Refill:  0        Patient Instructions   Good hand hygiene  Bactroban ointment to neck as directed   Bactrim as directed  Follow up with PCP or return to Urgent Care for worsening or unresolved symptoms. Patient given educational materials- see patient instructions. Discussed use, benefit, and side effects of prescribedmedications. All patient questions answered. Pt voiced understanding.        Electronically signed by BETH Arriola CNP on 12/20/2022 at 1:59 PM

## 2022-12-20 NOTE — PATIENT INSTRUCTIONS
Good hand hygiene  Bactroban ointment to neck as directed   Bactrim as directed  Follow up with PCP or return to Urgent Care for worsening or unresolved symptoms.

## 2023-02-01 ENCOUNTER — OFFICE VISIT (OUTPATIENT)
Age: 23
End: 2023-02-01

## 2023-02-01 VITALS
HEART RATE: 72 BPM | BODY MASS INDEX: 21.17 KG/M2 | WEIGHT: 105 LBS | HEIGHT: 59 IN | TEMPERATURE: 98.4 F | DIASTOLIC BLOOD PRESSURE: 74 MMHG | OXYGEN SATURATION: 100 % | SYSTOLIC BLOOD PRESSURE: 106 MMHG

## 2023-02-01 DIAGNOSIS — Z11.3 ENCOUNTER FOR SCREENING EXAMINATION FOR SEXUALLY TRANSMITTED DISEASE: ICD-10-CM

## 2023-02-01 DIAGNOSIS — Z72.51 HIGH RISK HETEROSEXUAL BEHAVIOR: ICD-10-CM

## 2023-02-01 DIAGNOSIS — J02.0 STREP PHARYNGITIS: Primary | ICD-10-CM

## 2023-02-01 LAB
C. TRACHOMATIS DNA ,URINE: NOT DETECTED
N. GONORRHOEAE DNA, URINE: NOT DETECTED
S PYO AG THROAT QL: POSITIVE
TRICHOMONAS VAGINALIS DNA, URINE: NOT DETECTED

## 2023-02-01 RX ORDER — AMOXICILLIN AND CLAVULANATE POTASSIUM 875; 125 MG/1; MG/1
1 TABLET, FILM COATED ORAL 2 TIMES DAILY
Qty: 20 TABLET | Refills: 0 | Status: SHIPPED | OUTPATIENT
Start: 2023-02-01 | End: 2023-02-11

## 2023-02-01 ASSESSMENT — ENCOUNTER SYMPTOMS
TROUBLE SWALLOWING: 1
SORE THROAT: 1

## 2023-02-01 NOTE — PATIENT INSTRUCTIONS
1. Antibiotics for full 10 days  2. Increase water intake  3. Stay home until fever free for 24 hours  4. Throw away toothbrush after 3rd full day of antibiotic  5. Avoid sharing drinks or food for at least 48 hours. 6. Monitor for rash, vomiting with inability to hold down medication or high fever that won't break - return or contact PCP if they occur  7. Warm salt water gargles or 1 teaspoon of honey every 4 hours for sore throat  8. STI screen will be called once available - treatment at that time if needed.  Abstain from unprotected sex until results return

## 2023-02-01 NOTE — PROGRESS NOTES
[address]  [address] [facility] [address] [address]  Dept: [phone]  Dept Fax: [phone]  Loc: [phone]    Anthony Aguilar is a 25 y.o. female who presents today for her medical conditions/complaints as noted below. Anthony Aguilar is c/o of Pharyngitis (Ongoing two days ago. ) and Exposure to STD        HPI:     HPI  Anthony Aguilar presents with complaints of sore throat and painful to swallow. Symptoms began yesterday. Requests STD testing as well. Denies known exposure. States she has been sexually active recently. OTC treatment includes drinking tea and allergy cold relief. Denies recent steroids. Had bactrim for skin issue and BV - flagyl in Dec. Denies recent covid19 infection. Past Medical History:   Diagnosis Date    Genital herpes      Past Surgical History:   Procedure Laterality Date    EXTERNAL EAR SURGERY Left        Family History   Problem Relation Age of Onset    Other Mother         Irregular menses    Diabetes Maternal Grandmother     Cancer Maternal Grandfather        Social History     Tobacco Use    Smoking status: Never    Smokeless tobacco: Never   Substance Use Topics    Alcohol use: No     Alcohol/week: 0.0 standard drinks      Current Outpatient Medications   Medication Sig Dispense Refill    amoxicillin-clavulanate (AUGMENTIN) 875-125 MG per tablet Take 1 tablet by mouth 2 times daily for 10 days 20 tablet 0    valACYclovir (VALTREX) 500 MG tablet Take 1 tablet by mouth daily 30 tablet 11    ondansetron (ZOFRAN ODT) 4 MG disintegrating tablet Take 1 tablet by mouth every 8 hours as needed for Nausea or Vomiting 10 tablet 0     No current facility-administered medications for this visit.      No Known Allergies    Health Maintenance   Topic Date Due    COVID-19 Vaccine (1) Never done    Depression Screen  Never done    Hepatitis C screen  Never done    Flu vaccine (1) 08/01/2022    Pap smear  Never done Chlamydia/GC screen  07/11/2023    DTaP/Tdap/Td vaccine (5 - Td or Tdap) 02/26/2029    Hepatitis A vaccine  Completed    Hib vaccine  Completed    HPV vaccine  Completed    Varicella vaccine  Completed    Meningococcal (ACWY) vaccine  Completed    HIV screen  Completed    Pneumococcal 0-64 years Vaccine  Aged Out       Subjective:     Review of Systems   Constitutional:  Negative for fever. HENT:  Positive for sore throat and trouble swallowing. Genitourinary:  Negative for dysuria and vaginal discharge.     :Objective      Physical Exam  Constitutional:       General: She is not in acute distress. Appearance: Normal appearance. She is not toxic-appearing. HENT:      Head: Normocephalic and atraumatic. Right Ear: Tympanic membrane, ear canal and external ear normal.      Left Ear: Tympanic membrane, ear canal and external ear normal.      Nose: Nose normal.      Mouth/Throat:      Mouth: Mucous membranes are moist.      Pharynx: Oropharyngeal exudate and posterior oropharyngeal erythema present. Eyes:      General:         Right eye: No discharge. Left eye: No discharge. Conjunctiva/sclera: Conjunctivae normal.   Cardiovascular:      Rate and Rhythm: Normal rate and regular rhythm. Pulmonary:      Effort: Pulmonary effort is normal. No respiratory distress. Abdominal:      General: Abdomen is flat. Palpations: Abdomen is soft. Genitourinary:     Comments: declines  Musculoskeletal:         General: Normal range of motion. Cervical back: Normal range of motion. Lymphadenopathy:      Cervical: No cervical adenopathy. Skin:     General: Skin is warm and dry. Capillary Refill: Capillary refill takes less than 2 seconds. Findings: No rash. Neurological:      General: No focal deficit present. Mental Status: She is alert.    Psychiatric:         Mood and Affect: Mood normal.     /74 (Site: Left Upper Arm, Position: Sitting, Cuff Size: Medium Adult) Pulse 72   Temp 98.4 °F (36.9 °C)   Ht 4' 11\" (1.499 m)   Wt 105 lb (47.6 kg)   SpO2 100%   BMI 21.21 kg/m²     :Assessment       Diagnosis Orders   1. Sore throat  POCT rapid strep A    amoxicillin-clavulanate (AUGMENTIN) 875-125 MG per tablet      2. High risk heterosexual behavior  Chlamydia/N. Gonorrhoeae/T. Vaginalis      3. Encounter for screening examination for sexually transmitted disease  Chlamydia/N. Gonorrhoeae/T. Vaginalis          :Plan   Augmentin for strep. Send for STI panel at hospital. Encouraged supportive care at home. Return precautions and home care education completed. Patient verbalized understanding. Orders Placed This Encounter   Procedures    Chlamydia/N. Gonorrhoeae/T. Vaginalis    POCT rapid strep A     Results for orders placed or performed in visit on 02/01/23   POCT rapid strep A   Result Value Ref Range    Strep A Ag Positive (A) None Detected       No follow-ups on file. Orders Placed This Encounter   Medications    amoxicillin-clavulanate (AUGMENTIN) 875-125 MG per tablet     Sig: Take 1 tablet by mouth 2 times daily for 10 days     Dispense:  20 tablet     Refill:  0       Patient given educational materials- see patient instructions. Discussed use, benefit, and side effects of prescribed medications. All patient questions answered. Pt voiced understanding. Patient Instructions   1. Antibiotics for full 10 days  2. Increase water intake  3. Stay home until fever free for 24 hours  4. Throw away toothbrush after 3rd full day of antibiotic  5. Avoid sharing drinks or food for at least 48 hours. 6. Monitor for rash, vomiting with inability to hold down medication or high fever that won't break - return or contact PCP if they occur  7. Warm salt water gargles or 1 teaspoon of honey every 4 hours for sore throat  8.  STI screen will be called once available - treatment at that time      Electronically signed by BETH Molina CNP on 2/1/2023 at 12:02 PM No

## 2023-02-10 ENCOUNTER — OFFICE VISIT (OUTPATIENT)
Age: 23
End: 2023-02-10
Payer: COMMERCIAL

## 2023-02-10 VITALS
TEMPERATURE: 98.1 F | BODY MASS INDEX: 20.76 KG/M2 | OXYGEN SATURATION: 100 % | WEIGHT: 103 LBS | HEIGHT: 59 IN | DIASTOLIC BLOOD PRESSURE: 62 MMHG | SYSTOLIC BLOOD PRESSURE: 90 MMHG | RESPIRATION RATE: 19 BRPM | HEART RATE: 84 BPM

## 2023-02-10 DIAGNOSIS — N89.8 VAGINAL DISCHARGE: ICD-10-CM

## 2023-02-10 DIAGNOSIS — B37.31 VAGINAL CANDIDIASIS: Primary | ICD-10-CM

## 2023-02-10 PROCEDURE — G8484 FLU IMMUNIZE NO ADMIN: HCPCS

## 2023-02-10 PROCEDURE — 1036F TOBACCO NON-USER: CPT

## 2023-02-10 PROCEDURE — G8420 CALC BMI NORM PARAMETERS: HCPCS

## 2023-02-10 PROCEDURE — G8427 DOCREV CUR MEDS BY ELIG CLIN: HCPCS

## 2023-02-10 PROCEDURE — 99213 OFFICE O/P EST LOW 20 MIN: CPT

## 2023-02-10 RX ORDER — FLUCONAZOLE 150 MG/1
150 TABLET ORAL
Qty: 2 TABLET | Refills: 0 | Status: SHIPPED | OUTPATIENT
Start: 2023-02-10 | End: 2023-02-16

## 2023-02-10 NOTE — PROGRESS NOTES
[address]  [address] [facility] [address] [address]  Dept: [phone]  Dept Fax: [phone]  Loc: [phone]    Nilson Leija is a 25 y.o. female who presents today for her medical conditions/complaints as noted below. Nilson Leija is c/o of Other (Clump discharge coming out of vagina/does not have odor)        HPI:     HPI  Nilson Leija presents with complaints of vaginal discharge and itching. Used flagyl gel and then began having clumpy discharge. Symptoms began a few days ago. OTC treatment includes flagyl gel. Denies recent steroids. Had augmentin for strep earlier this month. Past Medical History:   Diagnosis Date    Genital herpes      Past Surgical History:   Procedure Laterality Date    EXTERNAL EAR SURGERY Left        Family History   Problem Relation Age of Onset    Other Mother         Irregular menses    Diabetes Maternal Grandmother     Cancer Maternal Grandfather        Social History     Tobacco Use    Smoking status: Never    Smokeless tobacco: Never   Substance Use Topics    Alcohol use: No     Alcohol/week: 0.0 standard drinks      Current Outpatient Medications   Medication Sig Dispense Refill    fluconazole (DIFLUCAN) 150 MG tablet Take 1 tablet by mouth every 72 hours for 6 days 2 tablet 0    amoxicillin-clavulanate (AUGMENTIN) 875-125 MG per tablet Take 1 tablet by mouth 2 times daily for 10 days (Patient not taking: Reported on 2/10/2023) 20 tablet 0    valACYclovir (VALTREX) 500 MG tablet Take 1 tablet by mouth daily (Patient not taking: Reported on 2/10/2023) 30 tablet 11    ondansetron (ZOFRAN ODT) 4 MG disintegrating tablet Take 1 tablet by mouth every 8 hours as needed for Nausea or Vomiting (Patient not taking: Reported on 2/10/2023) 10 tablet 0     No current facility-administered medications for this visit.      No Known Allergies    Health Maintenance   Topic Date Due    COVID-19 Vaccine (1) Never done Depression Screen  Never done    Hepatitis C screen  Never done    Flu vaccine (1) 08/01/2022    Pap smear  Never done    Chlamydia/GC screen  02/01/2024    DTaP/Tdap/Td vaccine (5 - Td or Tdap) 02/26/2029    Hepatitis A vaccine  Completed    Hib vaccine  Completed    HPV vaccine  Completed    Varicella vaccine  Completed    Meningococcal (ACWY) vaccine  Completed    HIV screen  Completed    Pneumococcal 0-64 years Vaccine  Aged Out       Subjective:     Review of Systems   Genitourinary:  Positive for vaginal discharge (and itching). Negative for dysuria, frequency and genital sores.     :Objective      Physical Exam  Constitutional:       Appearance: Normal appearance. HENT:      Head: Normocephalic and atraumatic. Right Ear: External ear normal.      Left Ear: External ear normal.      Nose: Nose normal.      Mouth/Throat:      Mouth: Mucous membranes are moist.   Eyes:      General:         Right eye: No discharge. Left eye: No discharge. Conjunctiva/sclera: Conjunctivae normal.   Cardiovascular:      Rate and Rhythm: Normal rate and regular rhythm. Pulmonary:      Effort: Pulmonary effort is normal. No respiratory distress. Abdominal:      General: Abdomen is flat. Palpations: Abdomen is soft. Genitourinary:     Comments: declines  Musculoskeletal:         General: Normal range of motion. Cervical back: Normal range of motion. Lymphadenopathy:      Cervical: No cervical adenopathy. Skin:     General: Skin is warm and dry. Capillary Refill: Capillary refill takes less than 2 seconds. Findings: No rash. Neurological:      General: No focal deficit present. Mental Status: She is alert. Psychiatric:         Mood and Affect: Mood normal.     BP 90/62   Pulse 84   Temp 98.1 °F (36.7 °C)   Resp 19   Ht 4' 11\" (1.499 m)   Wt 103 lb (46.7 kg)   LMP 01/29/2023   SpO2 100%   BMI 20.80 kg/m²     :Assessment       Diagnosis Orders   1.  Vaginal candidiasis fluconazole (DIFLUCAN) 150 MG tablet    Miscellaneous Sendout      2. Vaginal discharge  Miscellaneous Sendout          :Plan   Suspect yeast infection given recent antibiotics. Plan for diflucan. Requests diatherix to r/o BV as well. Encouraged supportive care at home. Return precautions and home care education completed. Patient verbalized understanding. Orders Placed This Encounter   Procedures    Miscellaneous Sendout     Standing Status:   Future     Standing Expiration Date:   2/10/2024     Order Specific Question:   Specify Req. Test (1 Test/Order)     Answer:   diatherix - STI and yeast     No results found for this visit on 02/10/23. No follow-ups on file. Orders Placed This Encounter   Medications    fluconazole (DIFLUCAN) 150 MG tablet     Sig: Take 1 tablet by mouth every 72 hours for 6 days     Dispense:  2 tablet     Refill:  0       Patient given educational materials- see patient instructions. Discussed use, benefit, and side effects of prescribed medications. All patient questions answered. Pt voiced understanding. Patient Instructions   Diatherix results will be called to you once available. Do not have unprotected sex until results return and you have completed necessary treatment  Do not douche or use soaps to vagina  Return for new or worsening symptoms  Ensure partners are treated before sexual activity if positive for STIs  6.    Diflucan today and repeat in 3 days      Electronically signed by BETH Houston CNP on 2/10/2023 at 2:36 PM

## 2023-02-10 NOTE — PATIENT INSTRUCTIONS
[facility] results will be called to you once available. Do not have unprotected sex until results return and you have completed necessary treatment  Do not douche or use soaps to vagina  Return for new or worsening symptoms  Ensure partners are treated before sexual activity if positive for STIs  6.    Diflucan today and repeat in 3 days

## 2023-02-12 ENCOUNTER — TELEPHONE (OUTPATIENT)
Age: 23
End: 2023-02-12

## 2023-02-12 RX ORDER — METRONIDAZOLE 500 MG/1
500 TABLET ORAL 2 TIMES DAILY
Qty: 14 TABLET | Refills: 0 | Status: SHIPPED | OUTPATIENT
Start: 2023-02-12 | End: 2023-02-19

## 2023-02-12 RX ORDER — DOXYCYCLINE HYCLATE 100 MG
100 TABLET ORAL 2 TIMES DAILY
Qty: 20 TABLET | Refills: 0 | Status: SHIPPED | OUTPATIENT
Start: 2023-02-12 | End: 2023-02-22

## 2023-02-12 NOTE — TELEPHONE ENCOUNTER
Spoke with patient verified . Informed patient of test results and instructions. Patient understood and had no further questions.

## 2023-02-12 NOTE — TELEPHONE ENCOUNTER
Diatherix returned positive for 3 different bacterial infections, and 1 fungal infection. I have sent in Flagyl and doxycycline to her pharmacy. She should completely finish the Diflucan that was prescribed to her at her office visit. She needs to avoid alcohol while on the flagyl and for a few days after finishing it. She needs to avoid direct sunlight while taking the doxycycline. She needs to avoid sex until finishing the medication. Partner needs to get evaluated if symptomatic. Follow-up with PCP or gynecologist if symptoms persist or worsen.

## 2023-03-27 ENCOUNTER — OFFICE VISIT (OUTPATIENT)
Dept: OBGYN CLINIC | Age: 23
End: 2023-03-27

## 2023-03-27 VITALS
HEART RATE: 73 BPM | HEIGHT: 59 IN | SYSTOLIC BLOOD PRESSURE: 139 MMHG | BODY MASS INDEX: 21.77 KG/M2 | DIASTOLIC BLOOD PRESSURE: 90 MMHG | WEIGHT: 108 LBS

## 2023-03-27 DIAGNOSIS — Z12.4 SCREENING FOR CERVICAL CANCER: ICD-10-CM

## 2023-03-27 DIAGNOSIS — Z01.419 WELL WOMAN EXAM WITH ROUTINE GYNECOLOGICAL EXAM: Primary | ICD-10-CM

## 2023-03-27 DIAGNOSIS — Z11.3 SCREENING FOR STD (SEXUALLY TRANSMITTED DISEASE): ICD-10-CM

## 2023-03-27 SDOH — ECONOMIC STABILITY: FOOD INSECURITY: WITHIN THE PAST 12 MONTHS, YOU WORRIED THAT YOUR FOOD WOULD RUN OUT BEFORE YOU GOT MONEY TO BUY MORE.: PATIENT DECLINED

## 2023-03-27 SDOH — ECONOMIC STABILITY: TRANSPORTATION INSECURITY
IN THE PAST 12 MONTHS, HAS LACK OF TRANSPORTATION KEPT YOU FROM MEETINGS, WORK, OR FROM GETTING THINGS NEEDED FOR DAILY LIVING?: NO

## 2023-03-27 SDOH — ECONOMIC STABILITY: FOOD INSECURITY: WITHIN THE PAST 12 MONTHS, THE FOOD YOU BOUGHT JUST DIDN'T LAST AND YOU DIDN'T HAVE MONEY TO GET MORE.: PATIENT DECLINED

## 2023-03-27 SDOH — ECONOMIC STABILITY: HOUSING INSECURITY
IN THE LAST 12 MONTHS, WAS THERE A TIME WHEN YOU DID NOT HAVE A STEADY PLACE TO SLEEP OR SLEPT IN A SHELTER (INCLUDING NOW)?: NO

## 2023-03-27 SDOH — ECONOMIC STABILITY: INCOME INSECURITY: HOW HARD IS IT FOR YOU TO PAY FOR THE VERY BASICS LIKE FOOD, HOUSING, MEDICAL CARE, AND HEATING?: PATIENT DECLINED

## 2023-03-27 ASSESSMENT — ENCOUNTER SYMPTOMS
RESPIRATORY NEGATIVE: 1
GASTROINTESTINAL NEGATIVE: 1
EYES NEGATIVE: 1
ALLERGIC/IMMUNOLOGIC NEGATIVE: 1

## 2023-03-27 NOTE — PROGRESS NOTES
or fullness. Rectum: Normal. No mass, anal fissure or external hemorrhoid. Comments: Pap collected for cervical cytology  Musculoskeletal:         General: No tenderness. Normal range of motion. Cervical back: Normal range of motion and neck supple. Lymphadenopathy:      Cervical:      Right cervical: No superficial, deep or posterior cervical adenopathy. Left cervical: No superficial, deep or posterior cervical adenopathy. Upper Body:      Right upper body: No supraclavicular, pectoral or epitrochlear adenopathy. Left upper body: No supraclavicular, pectoral or epitrochlear adenopathy. Lower Body: No right inguinal adenopathy. No left inguinal adenopathy. Skin:     General: Skin is warm and dry. Capillary Refill: Capillary refill takes 2 to 3 seconds. Findings: No rash. Neurological:      Mental Status: She is alert and oriented to person, place, and time. She is not disoriented. Sensory: No sensory deficit. Coordination: Coordination normal.      Gait: Gait normal.      Deep Tendon Reflexes: Reflexes are normal and symmetric. Psychiatric:         Speech: Speech normal.         Behavior: Behavior normal.         Thought Content: Thought content normal.         Judgment: Judgment normal.        Diagnosis Orders   1. Well woman exam with routine gynecological exam        2. Screening for STD (sexually transmitted disease)  C.trachomatis, N.gonorrhoeae, T.vaginalis Molecular, Thin Prep      3. Screening for cervical cancer  PAP SMEAR          MEDICATIONS:  No orders of the defined types were placed in this encounter. ORDERS:  Orders Placed This Encounter   Procedures    C.trachomatis, N.gonorrhoeae, T.vaginalis Molecular, Thin Prep    PAP SMEAR       PLAN:  Pap and sti collected  RTC prn or in 1 year  There are no Patient Instructions on file for this visit.

## 2023-03-29 LAB
C TRACH DNA CVX QL NAA+PROBE: NOT DETECTED
N GONORRHOEA DNA SPEC QL NAA+PROBE: NOT DETECTED
TRICHOMONAS VAGINALIS DNA: NOT DETECTED

## 2023-04-08 LAB
HPV HR 12 DNA SPEC QL NAA+PROBE: DETECTED
HPV16 DNA SPEC QL NAA+PROBE: NOT DETECTED
HPV16+18+H RISK 12 DNA SPEC-IMP: ABNORMAL
HPV18 DNA SPEC QL NAA+PROBE: NOT DETECTED

## 2023-05-11 ENCOUNTER — OFFICE VISIT (OUTPATIENT)
Age: 23
End: 2023-05-11
Payer: COMMERCIAL

## 2023-05-11 VITALS
DIASTOLIC BLOOD PRESSURE: 68 MMHG | BODY MASS INDEX: 21.17 KG/M2 | HEIGHT: 59 IN | OXYGEN SATURATION: 100 % | WEIGHT: 105 LBS | HEART RATE: 82 BPM | RESPIRATION RATE: 19 BRPM | TEMPERATURE: 98.1 F | SYSTOLIC BLOOD PRESSURE: 100 MMHG

## 2023-05-11 DIAGNOSIS — H10.9 CONJUNCTIVITIS OF RIGHT EYE, UNSPECIFIED CONJUNCTIVITIS TYPE: Primary | ICD-10-CM

## 2023-05-11 PROCEDURE — G8420 CALC BMI NORM PARAMETERS: HCPCS | Performed by: NURSE PRACTITIONER

## 2023-05-11 PROCEDURE — 1036F TOBACCO NON-USER: CPT | Performed by: NURSE PRACTITIONER

## 2023-05-11 PROCEDURE — 99213 OFFICE O/P EST LOW 20 MIN: CPT | Performed by: NURSE PRACTITIONER

## 2023-05-11 PROCEDURE — G8427 DOCREV CUR MEDS BY ELIG CLIN: HCPCS | Performed by: NURSE PRACTITIONER

## 2023-05-11 RX ORDER — TOBRAMYCIN 3 MG/ML
1 SOLUTION/ DROPS OPHTHALMIC EVERY 4 HOURS
Qty: 5 ML | Refills: 0 | Status: SHIPPED | OUTPATIENT
Start: 2023-05-11 | End: 2023-05-21

## 2023-05-11 ASSESSMENT — ENCOUNTER SYMPTOMS
CHEST TIGHTNESS: 0
EYE DISCHARGE: 1
COLOR CHANGE: 0
ABDOMINAL PAIN: 0
COUGH: 0
ABDOMINAL DISTENTION: 0
EYE PAIN: 0
EYE REDNESS: 1
TROUBLE SWALLOWING: 0
WHEEZING: 0
SHORTNESS OF BREATH: 0
SINUS PRESSURE: 0
SORE THROAT: 0
STRIDOR: 0

## 2023-05-11 NOTE — PATIENT INSTRUCTIONS
Tobramycin sent  Keep eye clean and dry  Wash hands frequently  Follow-up with PCP as needed    Patient verbalized understanding and agrees to treatment plan.

## 2023-05-11 NOTE — PROGRESS NOTES
[address]  [address] [facility] [address] [address]  Dept: [phone]  Dept Fax: [phone]  Loc: [phone]    Danyelle Fowler is a 25 y.o. female who presents today for her medical conditions/complaints as noted below. Danyelle Fowler is complaining of Eye Problem (Watery/red/right eye/started 2 days ago)        HPI:   Eye Problem   Associated symptoms include an eye discharge and eye redness. Pertinent negatives include no fever or weakness. Oli Noguera presents to  the office complaining of right eye irritation and matting. Symptoms started 2 days ago. Denies vision changes or pain. Denies injury. Denies recent abx. Past Medical History:   Diagnosis Date    Genital herpes        Past Surgical History:   Procedure Laterality Date    EXTERNAL EAR SURGERY Left        Family History   Problem Relation Age of Onset    Other Mother         Irregular menses    Diabetes Maternal Grandmother     Cancer Maternal Grandfather        Social History     Tobacco Use    Smoking status: Never    Smokeless tobacco: Never   Substance Use Topics    Alcohol use: No     Alcohol/week: 0.0 standard drinks        Current Outpatient Medications   Medication Sig Dispense Refill    tobramycin (TOBREX) 0.3 % ophthalmic solution Place 1 drop into the right eye every 4 hours for 10 days 5 mL 0    valACYclovir (VALTREX) 500 MG tablet Take 1 tablet by mouth daily (Patient not taking: Reported on 5/11/2023) 30 tablet 11     No current facility-administered medications for this visit.        No Known Allergies    Health Maintenance   Topic Date Due    COVID-19 Vaccine (1) Never done    Depression Screen  Never done    Hepatitis C screen  Never done    Flu vaccine (Season Ended) 08/01/2023    Chlamydia/GC screen  02/01/2024    Pap smear  03/27/2026    DTaP/Tdap/Td vaccine (8 - Td or Tdap) 02/26/2029    Hepatitis A vaccine  Completed    Hib vaccine  Completed    HPV vaccine

## 2023-06-21 ENCOUNTER — TELEPHONE (OUTPATIENT)
Dept: OBGYN CLINIC | Age: 23
End: 2023-06-21

## 2023-06-26 ENCOUNTER — OFFICE VISIT (OUTPATIENT)
Age: 23
End: 2023-06-26
Payer: COMMERCIAL

## 2023-06-26 VITALS
HEART RATE: 54 BPM | RESPIRATION RATE: 20 BRPM | BODY MASS INDEX: 21.21 KG/M2 | DIASTOLIC BLOOD PRESSURE: 62 MMHG | TEMPERATURE: 98.1 F | OXYGEN SATURATION: 100 % | WEIGHT: 105 LBS | SYSTOLIC BLOOD PRESSURE: 118 MMHG

## 2023-06-26 DIAGNOSIS — R30.0 DYSURIA: Primary | ICD-10-CM

## 2023-06-26 LAB
APPEARANCE FLUID: CLEAR
BILIRUBIN, POC: ABNORMAL
BLOOD URINE, POC: ABNORMAL
CLARITY, POC: CLEAR
COLOR, POC: YELLOW
CONTROL: POSITIVE
GLUCOSE URINE, POC: ABNORMAL
KETONES, POC: ABNORMAL
LEUKOCYTE EST, POC: ABNORMAL
NITRITE, POC: ABNORMAL
PH, POC: 6
PREGNANCY TEST URINE, POC: NORMAL
PROTEIN, POC: ABNORMAL
SPECIFIC GRAVITY, POC: 1.02
UROBILINOGEN, POC: 0.2

## 2023-06-26 PROCEDURE — 1036F TOBACCO NON-USER: CPT | Performed by: NURSE PRACTITIONER

## 2023-06-26 PROCEDURE — 81003 URINALYSIS AUTO W/O SCOPE: CPT | Performed by: NURSE PRACTITIONER

## 2023-06-26 PROCEDURE — 99213 OFFICE O/P EST LOW 20 MIN: CPT | Performed by: NURSE PRACTITIONER

## 2023-06-26 PROCEDURE — G8420 CALC BMI NORM PARAMETERS: HCPCS | Performed by: NURSE PRACTITIONER

## 2023-06-26 PROCEDURE — G8427 DOCREV CUR MEDS BY ELIG CLIN: HCPCS | Performed by: NURSE PRACTITIONER

## 2023-06-26 ASSESSMENT — ENCOUNTER SYMPTOMS
TROUBLE SWALLOWING: 0
SORE THROAT: 0
COLOR CHANGE: 0
ABDOMINAL DISTENTION: 0
COUGH: 0
CHEST TIGHTNESS: 0
EYE PAIN: 0
WHEEZING: 0
SHORTNESS OF BREATH: 0
SINUS PRESSURE: 0
ABDOMINAL PAIN: 0
EYE DISCHARGE: 0
STRIDOR: 0

## 2023-06-27 DIAGNOSIS — N76.0 BV (BACTERIAL VAGINOSIS): Primary | ICD-10-CM

## 2023-06-27 DIAGNOSIS — B96.89 BV (BACTERIAL VAGINOSIS): Primary | ICD-10-CM

## 2023-06-27 RX ORDER — DOXYCYCLINE HYCLATE 100 MG
100 TABLET ORAL 2 TIMES DAILY
Qty: 20 TABLET | Refills: 0 | Status: SHIPPED | OUTPATIENT
Start: 2023-06-27 | End: 2023-07-07

## 2023-06-27 RX ORDER — METRONIDAZOLE 500 MG/1
500 TABLET ORAL 2 TIMES DAILY
Qty: 14 TABLET | Refills: 0 | Status: SHIPPED | OUTPATIENT
Start: 2023-06-27 | End: 2023-07-04

## 2023-06-28 LAB — BACTERIA UR CULT: NORMAL

## 2023-07-22 ENCOUNTER — OFFICE VISIT (OUTPATIENT)
Age: 23
End: 2023-07-22
Payer: COMMERCIAL

## 2023-07-22 VITALS
WEIGHT: 103 LBS | HEART RATE: 76 BPM | RESPIRATION RATE: 18 BRPM | DIASTOLIC BLOOD PRESSURE: 70 MMHG | TEMPERATURE: 97.6 F | BODY MASS INDEX: 20.8 KG/M2 | SYSTOLIC BLOOD PRESSURE: 106 MMHG | OXYGEN SATURATION: 100 %

## 2023-07-22 DIAGNOSIS — N91.2 AMENORRHEA: Primary | ICD-10-CM

## 2023-07-22 LAB
CONTROL: POSITIVE
PREGNANCY TEST URINE, POC: POSITIVE

## 2023-07-22 PROCEDURE — 1036F TOBACCO NON-USER: CPT | Performed by: NURSE PRACTITIONER

## 2023-07-22 PROCEDURE — 99212 OFFICE O/P EST SF 10 MIN: CPT | Performed by: NURSE PRACTITIONER

## 2023-07-22 PROCEDURE — G8420 CALC BMI NORM PARAMETERS: HCPCS | Performed by: NURSE PRACTITIONER

## 2023-07-22 PROCEDURE — G8427 DOCREV CUR MEDS BY ELIG CLIN: HCPCS | Performed by: NURSE PRACTITIONER

## 2023-07-22 ASSESSMENT — ENCOUNTER SYMPTOMS
SINUS PRESSURE: 0
STRIDOR: 0
ABDOMINAL DISTENTION: 0
ABDOMINAL PAIN: 0
NAUSEA: 1
COLOR CHANGE: 0
EYE PAIN: 0
COUGH: 0
CRAMPS: 1
WHEEZING: 0
SHORTNESS OF BREATH: 0
CHEST TIGHTNESS: 0
SORE THROAT: 0
TROUBLE SWALLOWING: 0
EYE DISCHARGE: 0

## 2023-07-22 NOTE — PROGRESS NOTES
[address] [place], [place] [address]  [address] [place] [address]  Dept: [phone]  Dept Fax: [phone]  Loc: [phone]    Bob Rae is a 25 y.o. female who presents today for her medical conditions/complaints as noted below. Bob Rae is complaining of Other (Pregnancy test), Nausea, and Abdominal Cramping        HPI:   Other  Associated symptoms include nausea. Pertinent negatives include no abdominal pain, arthralgias, chest pain, chills, congestion, coughing, fatigue, fever, neck pain, numbness, rash, sore throat or weakness. Abdominal Cramping  Associated symptoms include nausea. Pertinent negatives include no arthralgias, dysuria, fever, hematuria, rash or sore throat. Seven Childers presents to the office requesting a pregnancy test as she had 2 positives at home, missed her period, and is nauseous. Last period was in June. Past Medical History:   Diagnosis Date    Genital herpes        Past Surgical History:   Procedure Laterality Date    EXTERNAL EAR SURGERY Left        Family History   Problem Relation Age of Onset    Other Mother         Irregular menses    Diabetes Maternal Grandmother     Cancer Maternal Grandfather        Social History     Tobacco Use    Smoking status: Never    Smokeless tobacco: Never   Substance Use Topics    Alcohol use: No     Alcohol/week: 0.0 standard drinks        Current Outpatient Medications   Medication Sig Dispense Refill    valACYclovir (VALTREX) 500 MG tablet Take 1 tablet by mouth daily (Patient not taking: Reported on 7/22/2023) 30 tablet 11     No current facility-administered medications for this visit.        No Known Allergies    Health Maintenance   Topic Date Due    COVID-19 Vaccine (1) Never done    Depression Screen  Never done    Hepatitis C screen  Never done    Flu vaccine (1) 08/01/2023    Chlamydia/GC screen  02/01/2024    Pap smear  03/27/2026    DTaP/Tdap/Td vaccine (8 - Td or

## 2023-07-22 NOTE — PATIENT INSTRUCTIONS
UPT positive  Start prenatal vitamin and call OB/GYN  Avoid OTC medications without confirming they are safe  Go to the ER for worrisome symptoms

## 2023-07-24 ENCOUNTER — TELEPHONE (OUTPATIENT)
Dept: OBGYN CLINIC | Age: 23
End: 2023-07-24

## 2023-07-24 NOTE — TELEPHONE ENCOUNTER
Patient called to schedule a pregnancy confirmation. Last cycle beginning of June. Please return her call. Thank you!

## 2023-07-31 ENCOUNTER — OFFICE VISIT (OUTPATIENT)
Dept: OBGYN CLINIC | Age: 23
End: 2023-07-31
Payer: MEDICAID

## 2023-07-31 VITALS
DIASTOLIC BLOOD PRESSURE: 81 MMHG | WEIGHT: 103 LBS | HEART RATE: 86 BPM | HEIGHT: 59 IN | BODY MASS INDEX: 20.76 KG/M2 | SYSTOLIC BLOOD PRESSURE: 117 MMHG

## 2023-07-31 DIAGNOSIS — Z34.01 SUPERVISION OF NORMAL FIRST PREGNANCY IN FIRST TRIMESTER: ICD-10-CM

## 2023-07-31 DIAGNOSIS — R11.0 NAUSEA: ICD-10-CM

## 2023-07-31 DIAGNOSIS — Z3A.01 7 WEEKS GESTATION OF PREGNANCY: ICD-10-CM

## 2023-07-31 DIAGNOSIS — Z36.89 CONFIRM FETAL CARDIAC ACTIVITY USING ULTRASOUND: ICD-10-CM

## 2023-07-31 DIAGNOSIS — Z34.01 SUPERVISION OF NORMAL FIRST PREGNANCY IN FIRST TRIMESTER: Primary | ICD-10-CM

## 2023-07-31 LAB
ABO/RH: NORMAL
ANTIBODY SCREEN: NORMAL
C TRACH DNA UR QL NAA+PROBE: NOT DETECTED
HCV AB SERPL QL IA: NORMAL
HIV1 P24 AG SERPL QL IA: NORMAL
N GONORRHOEA DNA UR QL NAA+PROBE: NOT DETECTED
T VAGINALIS DNA UR QL NAA+PROBE: NOT DETECTED

## 2023-07-31 PROCEDURE — 99214 OFFICE O/P EST MOD 30 MIN: CPT | Performed by: NURSE PRACTITIONER

## 2023-07-31 PROCEDURE — G8427 DOCREV CUR MEDS BY ELIG CLIN: HCPCS | Performed by: NURSE PRACTITIONER

## 2023-07-31 PROCEDURE — 1036F TOBACCO NON-USER: CPT | Performed by: NURSE PRACTITIONER

## 2023-07-31 PROCEDURE — G8420 CALC BMI NORM PARAMETERS: HCPCS | Performed by: NURSE PRACTITIONER

## 2023-07-31 RX ORDER — PNV NO.95/FERROUS FUM/FOLIC AC 28MG-0.8MG
1 TABLET ORAL DAILY
Qty: 30 TABLET | Refills: 11 | Status: SHIPPED | OUTPATIENT
Start: 2023-07-31

## 2023-07-31 RX ORDER — PROMETHAZINE HYDROCHLORIDE 25 MG/1
25 TABLET ORAL 4 TIMES DAILY PRN
Qty: 20 TABLET | Refills: 0 | Status: SHIPPED | OUTPATIENT
Start: 2023-07-31 | End: 2023-08-07

## 2023-07-31 RX ORDER — ONDANSETRON 4 MG/1
4 TABLET, ORALLY DISINTEGRATING ORAL 3 TIMES DAILY PRN
Qty: 21 TABLET | Refills: 0 | Status: SHIPPED | OUTPATIENT
Start: 2023-07-31

## 2023-07-31 ASSESSMENT — PATIENT HEALTH QUESTIONNAIRE - PHQ9
SUM OF ALL RESPONSES TO PHQ QUESTIONS 1-9: 1
2. FEELING DOWN, DEPRESSED OR HOPELESS: NOT AT ALL
SUM OF ALL RESPONSES TO PHQ QUESTIONS 1-9: 1
SUM OF ALL RESPONSES TO PHQ9 QUESTIONS 1 & 2: 1
1. LITTLE INTEREST OR PLEASURE IN DOING THINGS: 1
2. FEELING DOWN, DEPRESSED OR HOPELESS: 0
SUM OF ALL RESPONSES TO PHQ QUESTIONS 1-9: 1
SUM OF ALL RESPONSES TO PHQ QUESTIONS 1-9: 1
1. LITTLE INTEREST OR PLEASURE IN DOING THINGS: SEVERAL DAYS
SUM OF ALL RESPONSES TO PHQ9 QUESTIONS 1 & 2: 1

## 2023-07-31 NOTE — PROGRESS NOTES
Patient presents today for routine prenatal care. Pt denies any vaginal leaking bleeding or contractions. Pt states a little cramping sometimes.

## 2023-08-01 ENCOUNTER — HOSPITAL ENCOUNTER (OUTPATIENT)
Dept: ULTRASOUND IMAGING | Age: 23
Discharge: HOME | End: 2023-08-01
Payer: COMMERCIAL

## 2023-08-01 DIAGNOSIS — Z36.89 CONFIRM FETAL CARDIAC ACTIVITY USING ULTRASOUND: ICD-10-CM

## 2023-08-01 LAB
DEPRECATED HGB OTHER BLD-IMP: 0 % (ref 0–0)
HGB A1 MFR BLD: 96.3 % (ref 95–97.9)
HGB A2 MFR BLD: 3.4 % (ref 2–3.5)
HGB C MFR BLD: 0 % (ref 0–0)
HGB E MFR BLD: 0 % (ref 0–0)
HGB F MFR BLD: 0.3 % (ref 0–2.1)
HGB FRACT BLD ELPH-IMP: NORMAL
HGB S BLD QL SOLY: NORMAL
HGB S MFR BLD: 0 % (ref 0–0)
PATH INTERP BLD-IMP: NORMAL

## 2023-08-01 PROCEDURE — 76817 TRANSVAGINAL US OBSTETRIC: CPT

## 2023-08-02 LAB
BACTERIA UR CULT: ABNORMAL
BACTERIA UR CULT: ABNORMAL
ORGANISM: ABNORMAL

## 2023-08-03 LAB
BASOPHILS # BLD: 0.1 K/UL (ref 0–0.2)
BASOPHILS NFR BLD: 0.5 % (ref 0–1)
EOSINOPHIL # BLD: 0 K/UL (ref 0–0.6)
EOSINOPHIL NFR BLD: 0.4 % (ref 0–5)
ERYTHROCYTE [DISTWIDTH] IN BLOOD BY AUTOMATED COUNT: 12.1 % (ref 11.5–14.5)
HBV SURFACE AG SERPL QL IA: ABNORMAL
HCT VFR BLD AUTO: 42.2 % (ref 37–47)
HGB BLD-MCNC: 13.7 G/DL (ref 12–16)
HIV-1 P24 AG: ABNORMAL
HIV1+2 AB SERPLBLD QL IA.RAPID: ABNORMAL
IMM GRANULOCYTES # BLD: 0 K/UL
LYMPHOCYTES # BLD: 2.5 K/UL (ref 1.1–4.5)
LYMPHOCYTES NFR BLD: 22.8 % (ref 20–40)
MCH RBC QN AUTO: 28.2 PG (ref 27–31)
MCHC RBC AUTO-ENTMCNC: 32.5 G/DL (ref 33–37)
MCV RBC AUTO: 86.8 FL (ref 81–99)
MONOCYTES # BLD: 0.6 K/UL (ref 0–0.9)
MONOCYTES NFR BLD: 5.7 % (ref 0–10)
NEUTROPHILS # BLD: 7.5 K/UL (ref 1.5–7.5)
NEUTS SEG NFR BLD: 70.2 % (ref 50–65)
PLATELET # BLD AUTO: 285 K/UL (ref 130–400)
PMV BLD AUTO: 11 FL (ref 9.4–12.3)
RBC # BLD AUTO: 4.86 M/UL (ref 4.2–5.4)
RPR SER QL: ABNORMAL
RUBV IGG SER-ACNC: REACTIVE [IU]/ML
WBC # BLD AUTO: 10.7 K/UL (ref 4.8–10.8)

## 2023-08-03 RX ORDER — AMPICILLIN 500 MG/1
500 CAPSULE ORAL 3 TIMES DAILY
Qty: 30 CAPSULE | Refills: 0 | Status: SHIPPED | OUTPATIENT
Start: 2023-08-03 | End: 2023-08-13

## 2023-08-07 LAB — VZV IGG SER QL IA: 2.93

## 2023-08-11 NOTE — PROGRESS NOTES
Bonita Rowe is a 25 y.o. female 9w3d who presents for routine prenatal visit. The patient was seen and evaluated. She is doing well. Denies vaginal bleeding or discharge. S      Bonita Rowe is a 25 y.o. female with the following history as recorded in [facility]:  Patient Active Problem List    Diagnosis Date Noted    Migraine without aura and without status migrainosus, not intractable 2018    Irregular menses 10/21/2015    Cramp of toe 10/21/2015    Menstrual migraine without status migrainosus, not intractable 10/21/2015     Current Outpatient Medications   Medication Sig Dispense Refill    ondansetron (ZOFRAN-ODT) 4 MG disintegrating tablet Take 1 tablet by mouth 3 times daily as needed for Nausea or Vomiting 21 tablet 0    Prenatal Vit-Fe Fumarate-FA (PRENATAL VITAMIN) 27-0.8 MG TABS Take 1 tablet by mouth daily 30 tablet 11    valACYclovir (VALTREX) 500 MG tablet Take 1 tablet by mouth daily (Patient not taking: Reported on 2023) 30 tablet 11     No current facility-administered medications for this visit. Allergies: Patient has no known allergies. Past Medical History:   Diagnosis Date    Genital herpes      Past Surgical History:   Procedure Laterality Date    EXTERNAL EAR SURGERY Left      Family History   Problem Relation Age of Onset    Other Mother         Irregular menses    Diabetes Maternal Grandmother     Cancer Maternal Grandfather      Social History     Tobacco Use    Smoking status: Never    Smokeless tobacco: Never   Substance Use Topics    Alcohol use: No     Alcohol/week: 0.0 standard drinks         Mother's Prenatal Vitals  BP: 106/68  Weight - Scale: 101 lb (45.8 kg)  Pulse: 72  Patient Position: Sitting  Prenatal Fetal Information  Fetal HR: 148 U//S  Movement: Absent  Physical Exam  Constitutional:       General: She is not in acute distress. Appearance: Normal appearance. She is not ill-appearing, toxic-appearing or diaphoretic.    HENT:      Head:

## 2023-08-14 ENCOUNTER — INITIAL PRENATAL (OUTPATIENT)
Dept: OBGYN CLINIC | Age: 23
End: 2023-08-14

## 2023-08-14 VITALS
SYSTOLIC BLOOD PRESSURE: 106 MMHG | BODY MASS INDEX: 20.4 KG/M2 | HEART RATE: 72 BPM | WEIGHT: 101 LBS | DIASTOLIC BLOOD PRESSURE: 68 MMHG

## 2023-08-14 DIAGNOSIS — Z34.01 SUPERVISION OF NORMAL FIRST PREGNANCY IN FIRST TRIMESTER: Primary | ICD-10-CM

## 2023-08-14 DIAGNOSIS — O23.40 URINARY TRACT INFECTION IN MOTHER DURING PREGNANCY, ANTEPARTUM: ICD-10-CM

## 2023-08-14 DIAGNOSIS — Z3A.09 9 WEEKS GESTATION OF PREGNANCY: ICD-10-CM

## 2023-08-14 PROCEDURE — 0502F SUBSEQUENT PRENATAL CARE: CPT | Performed by: OBSTETRICS & GYNECOLOGY

## 2023-08-14 SDOH — ECONOMIC STABILITY: INCOME INSECURITY: HOW HARD IS IT FOR YOU TO PAY FOR THE VERY BASICS LIKE FOOD, HOUSING, MEDICAL CARE, AND HEATING?: PATIENT DECLINED

## 2023-08-14 SDOH — ECONOMIC STABILITY: FOOD INSECURITY: WITHIN THE PAST 12 MONTHS, YOU WORRIED THAT YOUR FOOD WOULD RUN OUT BEFORE YOU GOT MONEY TO BUY MORE.: PATIENT DECLINED

## 2023-08-14 SDOH — ECONOMIC STABILITY: FOOD INSECURITY: WITHIN THE PAST 12 MONTHS, THE FOOD YOU BOUGHT JUST DIDN'T LAST AND YOU DIDN'T HAVE MONEY TO GET MORE.: PATIENT DECLINED

## 2023-08-14 NOTE — PATIENT INSTRUCTIONS
Patient Education        Weeks 10 to 14 of Your Pregnancy: Care Instructions  It's now possible to hear the fetus's heartbeat with a special ultrasound device. And the fetus's organs are developing. Decide about tests to check for birth defects. Think about your age, your chance of passing on a family disease, your need to know about any problems, and what you might do after you have the test results. It's okay to exercise. Try activities such as walking or swimming. Check with your doctor before starting a new program.     Charlie Martinez may feel more tired than usual.  Taking naps during the day may help. You may feel emotional.  It might help to talk to someone. You may have headaches. Try lying down and putting a cool cloth over your forehead. You can use acetaminophen (Tylenol) for pain relief. Don't take any anti-inflammatory medicines (such as Advil, Motrin, Aleve), unless your doctor says it's okay. You may feel a fullness or aching in your lower belly. This can feel like the kind of cramps you might get before a period. A back rub may help. You may need to urinate more. Your growing uterus and changing hormones can affect your bladder. You may feel sick to your stomach (morning sickness). Try avoiding food and smells that make you feel sick. Your breasts may feel different. They may feel tender or get bigger. Your nipples may get darker. Try a bra that gives you good support. Avoid alcohol, tobacco, and drugs (including marijuana). If you need help quitting, talk to your doctor. Take a daily prenatal vitamin. Choose one with folic acid. Follow-up care is a key part of your treatment and safety. Be sure to make and go to all appointments, and call your doctor if you are having problems. It's also a good idea to know your test results and keep a list of the medicines you take. Where can you learn more?   Go to http://www.woods.com/ and enter E090 to learn

## 2023-08-16 LAB
BACTERIA UR CULT: ABNORMAL
BACTERIA UR CULT: ABNORMAL
ORGANISM: ABNORMAL

## 2023-09-06 RX ORDER — PNV NO.95/FERROUS FUM/FOLIC AC 28MG-0.8MG
1 TABLET ORAL DAILY
Qty: 30 TABLET | Refills: 11 | Status: SHIPPED | OUTPATIENT
Start: 2023-09-06

## 2023-09-11 ENCOUNTER — ROUTINE PRENATAL (OUTPATIENT)
Dept: OBGYN CLINIC | Age: 23
End: 2023-09-11

## 2023-09-11 VITALS
WEIGHT: 101 LBS | BODY MASS INDEX: 20.4 KG/M2 | SYSTOLIC BLOOD PRESSURE: 121 MMHG | DIASTOLIC BLOOD PRESSURE: 81 MMHG | HEART RATE: 85 BPM

## 2023-09-11 DIAGNOSIS — Z3A.13 13 WEEKS GESTATION OF PREGNANCY: ICD-10-CM

## 2023-09-11 DIAGNOSIS — Z34.00 SUPERVISION OF NORMAL FIRST PREGNANCY, ANTEPARTUM: Primary | ICD-10-CM

## 2023-09-11 PROCEDURE — 0502F SUBSEQUENT PRENATAL CARE: CPT | Performed by: OBSTETRICS & GYNECOLOGY

## 2023-09-11 NOTE — PROGRESS NOTES
Piero García is a 25 y.o. female 13w0d who presents for routine prenatal visit. The patient was seen and evaluated. There was no fetal movements. She denies vaginal bleeding or cramping. Tyesha Ball is a 25 y.o. female [facility] the [facility]:  Patient Active Problem List    Diagnosis Date Noted    Supervision of normal first pregnancy, antepartum 09/11/2023    Migraine without aura and without status migrainosus, not intractable 04/12/2018    Irregular menses 10/21/2015    Cramp of toe 10/21/2015    Menstrual migraine without status migrainosus, not intractable 10/21/2015     Current Outpatient Medications   Medication Sig Dispense Refill    Prenatal Vit-Fe Fumarate-FA (PRENATAL VITAMIN) 27-0.8 MG TABS Take 1 tablet by mouth daily 30 tablet 11    ondansetron (ZOFRAN-ODT) 4 MG disintegrating tablet Take 1 tablet by mouth 3 times daily as needed for Nausea or Vomiting 21 tablet 0     No current facility-administered medications for this visit. Allergies: Patient has no known allergies. Past Medical History:   Diagnosis Date    Genital herpes      Past Surgical History:   Procedure Laterality Date    EXTERNAL EAR SURGERY Left      Family History   Problem Relation Age of Onset    Other Mother         Irregular menses    Diabetes Maternal Grandmother     Cancer Maternal Grandfather      Social History     Tobacco Use    Smoking status: Never    Smokeless tobacco: Never   Substance Use Topics    Alcohol use: No     Alcohol/week: 0.0 standard drinks of alcohol         Mother's Prenatal Vitals  BP: 121/81  Weight - Scale: 101 lb (45.8 kg)  Pulse: 85  Patient Position: Sitting  Alb/Glu  Albumin: Trace  Glucose: Negative  Prenatal Fetal Information  Fetal HR: 152-u/s  Movement: Absent  Physical Exam  Constitutional:       General: She is not in acute distress. Appearance: Normal appearance. She is not ill-appearing, toxic-appearing or diaphoretic.    HENT:      Head:

## 2023-10-17 ENCOUNTER — ROUTINE PRENATAL (OUTPATIENT)
Dept: OBGYN CLINIC | Age: 23
End: 2023-10-17

## 2023-10-17 VITALS
BODY MASS INDEX: 21.61 KG/M2 | SYSTOLIC BLOOD PRESSURE: 132 MMHG | HEART RATE: 73 BPM | DIASTOLIC BLOOD PRESSURE: 73 MMHG | WEIGHT: 107 LBS

## 2023-10-17 DIAGNOSIS — Z36.89 ENCOUNTER FOR FETAL ANATOMIC SURVEY: ICD-10-CM

## 2023-10-17 DIAGNOSIS — Z3A.18 18 WEEKS GESTATION OF PREGNANCY: ICD-10-CM

## 2023-10-17 DIAGNOSIS — Z34.00 SUPERVISION OF NORMAL FIRST PREGNANCY, ANTEPARTUM: Primary | ICD-10-CM

## 2023-10-17 PROCEDURE — 0502F SUBSEQUENT PRENATAL CARE: CPT | Performed by: NURSE PRACTITIONER

## 2023-10-17 RX ORDER — ONDANSETRON 4 MG/1
4 TABLET, ORALLY DISINTEGRATING ORAL 3 TIMES DAILY PRN
Qty: 21 TABLET | Refills: 0 | Status: SHIPPED | OUTPATIENT
Start: 2023-10-17

## 2023-10-17 RX ORDER — PNV NO.95/FERROUS FUM/FOLIC AC 28MG-0.8MG
1 TABLET ORAL DAILY
Qty: 30 TABLET | Refills: 11 | Status: SHIPPED | OUTPATIENT
Start: 2023-10-17

## 2023-10-17 NOTE — PROGRESS NOTES
Patient presents today for routine prenatal care. Pt denies any vaginal leaking bleeding or contractions. Would like a refill on prenatal meds today.

## 2023-11-10 NOTE — PATIENT INSTRUCTIONS
[address] [facility]   One Hour Glucose Test Instructions    The 1 Hour Glucose test is designed to screen for gestational diabetes. This screening test is usually performed between 26-29 weeks of gestation. Gestational diabetes results in higher than normal blood sugar levels and can lead to pregnancy complications if not diagnosed and treated. For this reason, we recommend that all women undergo screening.  - You may eat or drink a normal breakfast or lunch prior to the test, but please avoid anything that contains excessive sugar. For example, do not eat sugary cereals, candy or drink soda or fruit juice.  - You will be given this drink at the Outpatient Lab (#[id number]) located on the 1st floor of [address] [place] [address] the entire bottle of the glucose drink, within 10 minutes and note the time that you finish the drink. Also, inform the  of the time that you finish. After drinking the glucose, you may only have water until your blood is drawn.    - Your blood needs to be drawn precisely 1 hour after you finished the glucose drink. If you have a prenatal appointment as well, when you arrive at the office please let the  know that you are doing the glucose test. Let her know the time you need to return to the lab area to have your blood drawn for the testing.  - You will also have a CBC drawn at this time to check your blood count and check your iron.      Please do not hesitate to call the office at [phone], option 2, if you have any additional questions

## 2023-11-13 ENCOUNTER — ROUTINE PRENATAL (OUTPATIENT)
Dept: OBGYN CLINIC | Age: 23
End: 2023-11-13

## 2023-11-13 VITALS — SYSTOLIC BLOOD PRESSURE: 120 MMHG | BODY MASS INDEX: 22.62 KG/M2 | DIASTOLIC BLOOD PRESSURE: 74 MMHG | WEIGHT: 112 LBS

## 2023-11-13 DIAGNOSIS — Z3A.22 22 WEEKS GESTATION OF PREGNANCY: ICD-10-CM

## 2023-11-13 DIAGNOSIS — Z34.02 ENCOUNTER FOR SUPERVISION OF NORMAL FIRST PREGNANCY IN SECOND TRIMESTER: Primary | ICD-10-CM

## 2023-11-13 NOTE — PROGRESS NOTES
Phu George is a 26 y.o. female 21w6d who presents for routine prenatal visit. The patient was seen and evaluated. There was normal fetal movements. No contractions, bleeding or leakage of fluid. Signs and symptoms of  labor as well as labor were reviewed. The S/S of Pre-Eclampsia were reviewed with the patient in detail. She is to report any of these if they occur. She currently denies any of these. Joby Barreto is a 26 y.o. female with the following history as recorded in [facility]:  Patient Active Problem List    Diagnosis Date Noted    Supervision of normal first pregnancy, antepartum 2023    Migraine without aura and without status migrainosus, not intractable 2018    Irregular menses 10/21/2015    Cramp of toe 10/21/2015    Menstrual migraine without status migrainosus, not intractable 10/21/2015     Current Outpatient Medications   Medication Sig Dispense Refill    Prenatal Vit-Fe Fumarate-FA (PRENATAL VITAMIN) 27-0.8 MG TABS Take 1 tablet by mouth daily 30 tablet 11    ondansetron (ZOFRAN-ODT) 4 MG disintegrating tablet Take 1 tablet by mouth 3 times daily as needed for Nausea or Vomiting 21 tablet 0     No current facility-administered medications for this visit. Allergies: Patient has no known allergies.   Past Medical History:   Diagnosis Date    Genital herpes      Past Surgical History:   Procedure Laterality Date    EXTERNAL EAR SURGERY Left      Family History   Problem Relation Age of Onset    Other Mother         Irregular menses    Diabetes Maternal Grandmother     Cancer Maternal Grandfather      Social History     Tobacco Use    Smoking status: Never    Smokeless tobacco: Never   Substance Use Topics    Alcohol use: No     Alcohol/week: 0.0 standard drinks of alcohol         Mother's Prenatal Vitals  BP: 120/74  Weight - Scale: 50.8 kg (112 lb)  Patient Position: Sitting  Alb/Glu  Albumin: Trace  Glucose: Negative  Prenatal Fetal Information  Fundal Height

## 2023-12-11 ENCOUNTER — HOSPITAL ENCOUNTER (OUTPATIENT)
Age: 23
Setting detail: OBSERVATION
Discharge: HOME | End: 2023-12-12
Attending: OBSTETRICS & GYNECOLOGY | Admitting: OBSTETRICS & GYNECOLOGY
Payer: COMMERCIAL

## 2023-12-11 ENCOUNTER — APPOINTMENT (OUTPATIENT)
Dept: ULTRASOUND IMAGING | Age: 23
End: 2023-12-11
Payer: COMMERCIAL

## 2023-12-11 DIAGNOSIS — Z3A.22 22 WEEKS GESTATION OF PREGNANCY: ICD-10-CM

## 2023-12-11 PROBLEM — R03.0 BLOOD PRESSURE ELEVATED WITHOUT HISTORY OF HTN: Status: ACTIVE | Noted: 2023-12-11

## 2023-12-11 LAB
ALBUMIN SERPL-MCNC: 3.9 G/DL (ref 3.5–5.2)
ALP SERPL-CCNC: 77 U/L (ref 35–104)
ALT SERPL-CCNC: 14 U/L (ref 5–33)
ANION GAP SERPL CALCULATED.3IONS-SCNC: 10 MMOL/L (ref 7–19)
AST SERPL-CCNC: 19 U/L (ref 5–32)
BASOPHILS # BLD: 0 K/UL (ref 0–0.2)
BASOPHILS NFR BLD: 0.2 % (ref 0–1)
BILIRUB SERPL-MCNC: <0.2 MG/DL (ref 0.2–1.2)
BUN SERPL-MCNC: 5 MG/DL (ref 6–20)
CALCIUM SERPL-MCNC: 9 MG/DL (ref 8.6–10)
CHLORIDE SERPL-SCNC: 102 MMOL/L (ref 98–111)
CO2 SERPL-SCNC: 24 MMOL/L (ref 22–29)
CREAT SERPL-MCNC: 0.5 MG/DL (ref 0.5–0.9)
CREAT UR-MCNC: 74.4 MG/DL (ref 28–217)
EOSINOPHIL # BLD: 0 K/UL (ref 0–0.6)
EOSINOPHIL NFR BLD: 0.3 % (ref 0–5)
ERYTHROCYTE [DISTWIDTH] IN BLOOD BY AUTOMATED COUNT: 13.4 % (ref 11.5–14.5)
GLUCOSE 1H P MEAL SERPL-MCNC: 116 MG/DL (ref 75–140)
GLUCOSE SERPL-MCNC: 118 MG/DL (ref 74–109)
HCT VFR BLD AUTO: 36.9 % (ref 37–47)
HGB BLD-MCNC: 12.2 G/DL (ref 12–16)
IMM GRANULOCYTES # BLD: 0.1 K/UL
LYMPHOCYTES # BLD: 2.4 K/UL (ref 1.1–4.5)
LYMPHOCYTES NFR BLD: 18.2 % (ref 20–40)
MCH RBC QN AUTO: 29 PG (ref 27–31)
MCHC RBC AUTO-ENTMCNC: 33.1 G/DL (ref 33–37)
MCV RBC AUTO: 87.6 FL (ref 81–99)
MONOCYTES # BLD: 0.8 K/UL (ref 0–0.9)
MONOCYTES NFR BLD: 5.9 % (ref 0–10)
NEUTROPHILS # BLD: 9.6 K/UL (ref 1.5–7.5)
NEUTS SEG NFR BLD: 74.5 % (ref 50–65)
PLATELET # BLD AUTO: 200 K/UL (ref 130–400)
PMV BLD AUTO: 11.1 FL (ref 9.4–12.3)
POTASSIUM SERPL-SCNC: 4.3 MMOL/L (ref 3.5–5)
PROT SERPL-MCNC: 6.8 G/DL (ref 6.6–8.7)
PROT UR-MCNC: 10 MG/DL (ref 15–45)
RBC # BLD AUTO: 4.21 M/UL (ref 4.2–5.4)
SODIUM SERPL-SCNC: 136 MMOL/L (ref 136–145)
URATE UR-MCNC: 46.1 MG/DL
WBC # BLD AUTO: 12.9 K/UL (ref 4.8–10.8)

## 2023-12-11 PROCEDURE — 96372 THER/PROPH/DIAG INJ SC/IM: CPT

## 2023-12-11 PROCEDURE — 99213 OFFICE O/P EST LOW 20 MIN: CPT

## 2023-12-11 PROCEDURE — 84560 ASSAY OF URINE/URIC ACID: CPT

## 2023-12-11 PROCEDURE — 76815 OB US LIMITED FETUS(S): CPT

## 2023-12-11 PROCEDURE — 6370000000 HC RX 637 (ALT 250 FOR IP): Performed by: OBSTETRICS & GYNECOLOGY

## 2023-12-11 PROCEDURE — 6360000002 HC RX W HCPCS: Performed by: OBSTETRICS & GYNECOLOGY

## 2023-12-11 PROCEDURE — 84156 ASSAY OF PROTEIN URINE: CPT

## 2023-12-11 PROCEDURE — G0378 HOSPITAL OBSERVATION PER HR: HCPCS

## 2023-12-11 PROCEDURE — 85025 COMPLETE CBC W/AUTO DIFF WBC: CPT

## 2023-12-11 PROCEDURE — 82570 ASSAY OF URINE CREATININE: CPT

## 2023-12-11 PROCEDURE — G0379 DIRECT REFER HOSPITAL OBSERV: HCPCS

## 2023-12-11 PROCEDURE — 80053 COMPREHEN METABOLIC PANEL: CPT

## 2023-12-11 PROCEDURE — 36415 COLL VENOUS BLD VENIPUNCTURE: CPT

## 2023-12-11 RX ORDER — NIFEDIPINE 30 MG/1
30 TABLET, EXTENDED RELEASE ORAL DAILY
Status: DISCONTINUED | OUTPATIENT
Start: 2023-12-11 | End: 2023-12-13 | Stop reason: HOSPADM

## 2023-12-11 RX ORDER — IBUPROFEN 400 MG/1
800 TABLET ORAL EVERY 8 HOURS SCHEDULED
Status: DISCONTINUED | OUTPATIENT
Start: 2023-12-11 | End: 2023-12-11

## 2023-12-11 RX ORDER — ONDANSETRON 4 MG/1
4 TABLET, ORALLY DISINTEGRATING ORAL EVERY 8 HOURS PRN
Status: DISCONTINUED | OUTPATIENT
Start: 2023-12-11 | End: 2023-12-13 | Stop reason: HOSPADM

## 2023-12-11 RX ORDER — ONDANSETRON 2 MG/ML
4 INJECTION INTRAMUSCULAR; INTRAVENOUS EVERY 6 HOURS PRN
Status: DISCONTINUED | OUTPATIENT
Start: 2023-12-11 | End: 2023-12-13 | Stop reason: HOSPADM

## 2023-12-11 RX ORDER — ACETAMINOPHEN 500 MG
1000 TABLET ORAL EVERY 8 HOURS SCHEDULED
Status: DISCONTINUED | OUTPATIENT
Start: 2023-12-11 | End: 2023-12-13 | Stop reason: HOSPADM

## 2023-12-11 RX ORDER — BETAMETHASONE SODIUM PHOSPHATE AND BETAMETHASONE ACETATE 3; 3 MG/ML; MG/ML
12 INJECTION, SUSPENSION INTRA-ARTICULAR; INTRALESIONAL; INTRAMUSCULAR; SOFT TISSUE EVERY 24 HOURS
Status: COMPLETED | OUTPATIENT
Start: 2023-12-11 | End: 2023-12-12

## 2023-12-11 RX ADMIN — Medication 12 MG: at 12:16

## 2023-12-11 RX ADMIN — NIFEDIPINE 30 MG: 30 TABLET, EXTENDED RELEASE ORAL at 20:35

## 2023-12-11 ASSESSMENT — PAIN - FUNCTIONAL ASSESSMENT: PAIN_FUNCTIONAL_ASSESSMENT: ACTIVITIES ARE NOT PREVENTED

## 2023-12-11 ASSESSMENT — PAIN SCALES - GENERAL
PAINLEVEL_OUTOF10: 0
PAINLEVEL_OUTOF10: 0

## 2023-12-11 NOTE — FLOWSHEET NOTE
Pt sent over from Dr. Emma Valdez office for high bps at 26w3d. Pt denies H/A, vision changes, and RUQ pain. Pt reports +FM and denies LOF, VB and ctx's. Orders received from Dr. Bridgette Mata to start 24 urine and order [facility] labs and ultrasound and admister betamethazone. Will continue with orders.

## 2023-12-12 VITALS
OXYGEN SATURATION: 100 % | SYSTOLIC BLOOD PRESSURE: 137 MMHG | TEMPERATURE: 99.3 F | RESPIRATION RATE: 18 BRPM | DIASTOLIC BLOOD PRESSURE: 92 MMHG | HEART RATE: 107 BPM

## 2023-12-12 LAB
CREAT 24H UR-MRATE: 1.4 G/24HR (ref 1–2)
PROT 24H UR-MRATE: 152 MG/24HR (ref 50–100)
SPECIMEN VOL 24H UR: 3800 ML

## 2023-12-12 PROCEDURE — G0378 HOSPITAL OBSERVATION PER HR: HCPCS

## 2023-12-12 PROCEDURE — 6360000002 HC RX W HCPCS: Performed by: OBSTETRICS & GYNECOLOGY

## 2023-12-12 PROCEDURE — 6370000000 HC RX 637 (ALT 250 FOR IP): Performed by: OBSTETRICS & GYNECOLOGY

## 2023-12-12 PROCEDURE — 96372 THER/PROPH/DIAG INJ SC/IM: CPT

## 2023-12-12 PROCEDURE — 84156 ASSAY OF PROTEIN URINE: CPT

## 2023-12-12 PROCEDURE — 59025 FETAL NON-STRESS TEST: CPT

## 2023-12-12 RX ORDER — NIFEDIPINE 30 MG/1
30 TABLET, EXTENDED RELEASE ORAL DAILY
COMMUNITY
End: 2023-12-13 | Stop reason: SDUPTHER

## 2023-12-12 RX ADMIN — Medication 12 MG: at 12:21

## 2023-12-12 RX ADMIN — NIFEDIPINE 30 MG: 30 TABLET, EXTENDED RELEASE ORAL at 09:22

## 2023-12-12 ASSESSMENT — PAIN - FUNCTIONAL ASSESSMENT
PAIN_FUNCTIONAL_ASSESSMENT: ACTIVITIES ARE NOT PREVENTED
PAIN_FUNCTIONAL_ASSESSMENT: ACTIVITIES ARE NOT PREVENTED

## 2023-12-12 ASSESSMENT — PAIN SCALES - GENERAL
PAINLEVEL_OUTOF10: 0
PAINLEVEL_OUTOF10: 0

## 2023-12-13 ENCOUNTER — TELEPHONE (OUTPATIENT)
Dept: OBGYN CLINIC | Age: 23
End: 2023-12-13

## 2023-12-13 ENCOUNTER — PATIENT MESSAGE (OUTPATIENT)
Dept: OBGYN CLINIC | Age: 23
End: 2023-12-13

## 2023-12-13 DIAGNOSIS — O13.2 GESTATIONAL HYPERTENSION, SECOND TRIMESTER: ICD-10-CM

## 2023-12-13 DIAGNOSIS — O13.2 GESTATIONAL HYPERTENSION, SECOND TRIMESTER: Primary | ICD-10-CM

## 2023-12-13 RX ORDER — NIFEDIPINE 30 MG/1
30 TABLET, EXTENDED RELEASE ORAL DAILY
Qty: 30 TABLET | Refills: 1 | Status: SHIPPED | OUTPATIENT
Start: 2023-12-13

## 2023-12-13 RX ORDER — NIFEDIPINE 30 MG/1
30 TABLET, EXTENDED RELEASE ORAL DAILY
Qty: 30 TABLET | Refills: 1 | Status: SHIPPED | OUTPATIENT
Start: 2023-12-13 | End: 2023-12-13 | Stop reason: SDUPTHER

## 2023-12-13 NOTE — DISCHARGE SUMMARY
Pt given Discharge Instructions. Verbalized understanding of all including s/s Pre-Eclampsia to report, s/s  Labor to report, make appointment to see Dr Makeda Castaneda on 2023, take blood pressure twice per day, continue to take Prenatal Vitamins, fill and  prescription for Procardia from pharmacy tomorrow morning and take as directed. Pt given Universal BP cuff to take home for BP checks. Unable to print out AVS.  Pt took photos of all AVS information & instructions from computer screen at bedside to have as reference.

## 2023-12-13 NOTE — DISCHARGE INSTRUCTIONS
LABOR AND DELIVERY - OBSERVATION DISCHARGE INSTRUCTIONS      PRE-TERM LABOR  _X_Your gestational age is 26+1 weeks: term pregnancy starts at 42 weeks. __Fill your prescription and take as directed. __Bed rest (left lying position is best). __Refrain from sexual intercourse until you see your physician again. __No heavy lifting or strenuous activity. RETURN TO HOSPITAL IF:  __Contractions occur 3-4 in any hour (every 10-15 minutes), maybe with or without pain. __Rhythmic or constant menstrual-like cramps  __Rhythmic or constant lower, dull backache may radiate to the sides or front. Increase or change in vaginal discharge, may be watery, pink, red or brown-tinged. PRE-ECLAMPSIA  __Bed rest, left side lying position  __Elevate legs while sitting  __Maintain quiet, peaceful environment  _X_Eat well-balanced meals, no added salt, avoid processed lunch meats, canned soups, potato chips, etc.    SYMPTOMS THAT REQUIRE PROMPT REPORTING:  _X_Rapid gain in weight  _X_Persistent or severe headache  _X_Visual disturbances (spots, blurred)  _X_Nausea/vomiting, with or without upper abdominal pain. _X_Increase or persistent swelling (face puffiness, hands, legs, ankles)  _X_Decreased urinary output  _X_Drowsiness listlessness      NAUSEA/VOMITING HYPEREMESIS  _X_Eat small, frequent meals instead of three large meals  _X_Drink liquids (such as 7-up or ginger ale) between meals instead of with meals  _X_Eat a few crackers or toast before getting out of bed in the morning      OTHER INSTRUCTIONS  _X_ Make an appointment to see your attending physician for Monday, December 18, 2023. _X_ Take blood pressures twice per day. _X_  prescription for Procardia tomorrow morning and take as directed.        NOTE: If you do not begin to feel better, or you have any questions, contact your physician or call [phone], or return to the hospital.

## 2023-12-13 NOTE — TELEPHONE ENCOUNTER
From: Nando Singleton  To: Dr. Lafleur [place]: 12/13/2023 12:56 PM CST  Subject: Appointment Request    Appointment Request From: Nando Singleton    With Provider: Dimas Dancer, MD [facility]]    Preferred Date Range: Any date 12/18/2023 or later    Preferred Times: Any Time    Reason for visit: Request an Appointment    Comments:  N/A

## 2023-12-13 NOTE — PROGRESS NOTES
Pt c/o \"slight headache\" but refuses Tylenol at this time. Denies blurry vision or epigastric pain. Mother at bedside.

## 2023-12-20 NOTE — PROGRESS NOTES
Pt denies any vaginal leaking bleeding or contractions. + Fetal movement.    Pt states since taking procardia wakes up with a headache

## 2023-12-21 ENCOUNTER — ROUTINE PRENATAL (OUTPATIENT)
Dept: OBGYN CLINIC | Age: 23
End: 2023-12-21

## 2023-12-21 VITALS
HEART RATE: 108 BPM | SYSTOLIC BLOOD PRESSURE: 128 MMHG | DIASTOLIC BLOOD PRESSURE: 89 MMHG | BODY MASS INDEX: 24.44 KG/M2 | WEIGHT: 121 LBS

## 2023-12-21 DIAGNOSIS — B00.9 HSV INFECTION: ICD-10-CM

## 2023-12-21 DIAGNOSIS — Z3A.27 27 WEEKS GESTATION OF PREGNANCY: ICD-10-CM

## 2023-12-21 DIAGNOSIS — O13.2 GESTATIONAL HYPERTENSION, SECOND TRIMESTER: ICD-10-CM

## 2023-12-21 DIAGNOSIS — Z34.02 ENCOUNTER FOR SUPERVISION OF NORMAL FIRST PREGNANCY IN SECOND TRIMESTER: Primary | ICD-10-CM

## 2023-12-21 PROCEDURE — 0502F SUBSEQUENT PRENATAL CARE: CPT | Performed by: OBSTETRICS & GYNECOLOGY

## 2023-12-21 NOTE — PROGRESS NOTES
Scotty Bernabe is a 26 y.o. female 27w3d who presents for routine prenatal visit. The patient was seen and evaluated. There was positive fetal movements. No contractions, bleeding or leakage of fluid. Signs and symptoms of  labor as well as labor were reviewed. The S/S of Pre-Eclampsia were reviewed with the patient in detail. She is to report any of these if they occur. She currently denies any of these. Gilbert Queen is a 26 y.o. female with the following history as recorded in [facility]:  Patient Active Problem List    Diagnosis Date Noted    Gestational hypertension, second trimester 2023    Blood pressure elevated without history of HTN 2023    Supervision of normal first pregnancy, antepartum 2023    Migraine without aura and without status migrainosus, not intractable 2018    Irregular menses 10/21/2015    Cramp of toe 10/21/2015    Menstrual migraine without status migrainosus, not intractable 10/21/2015     Current Outpatient Medications on File Prior to Visit   Medication Sig Dispense Refill    NIFEdipine (PROCARDIA XL) 30 MG extended release tablet Take 1 tablet by mouth daily 30 tablet 1    Prenatal Vit-Fe Fumarate-FA (PRENATAL VITAMIN) 27-0.8 MG TABS Take 1 tablet by mouth daily 30 tablet 11    ondansetron (ZOFRAN-ODT) 4 MG disintegrating tablet Take 1 tablet by mouth 3 times daily as needed for Nausea or Vomiting 21 tablet 0    Prenatal Vit-Fe Fumarate-FA (PRENATAL VITAMIN) 28-0.8 MG TABS tablet Take 1 tablet by mouth daily 30 tablet 0     No current facility-administered medications on file prior to visit. Allergies: Patient has no known allergies.   Past Medical History:   Diagnosis Date    Genital herpes      Past Surgical History:   Procedure Laterality Date    EXTERNAL EAR SURGERY Left      Family History   Problem Relation Age of Onset    Other Mother         Irregular menses    Diabetes Maternal Grandmother     Cancer Maternal Grandfather

## 2023-12-30 PROBLEM — O13.2 GESTATIONAL HYPERTENSION, SECOND TRIMESTER: Status: ACTIVE | Noted: 2023-12-30

## 2024-01-04 ENCOUNTER — ROUTINE PRENATAL (OUTPATIENT)
Dept: OBGYN CLINIC | Age: 24
End: 2024-01-04
Payer: MEDICAID

## 2024-01-04 VITALS
BODY MASS INDEX: 26.05 KG/M2 | DIASTOLIC BLOOD PRESSURE: 105 MMHG | SYSTOLIC BLOOD PRESSURE: 158 MMHG | HEART RATE: 78 BPM | WEIGHT: 129 LBS

## 2024-01-04 DIAGNOSIS — B00.9 HSV INFECTION: ICD-10-CM

## 2024-01-04 DIAGNOSIS — O13.2 GESTATIONAL HYPERTENSION, SECOND TRIMESTER: ICD-10-CM

## 2024-01-04 DIAGNOSIS — Z3A.29 29 WEEKS GESTATION OF PREGNANCY: Primary | ICD-10-CM

## 2024-01-04 LAB
ALBUMIN SERPL-MCNC: 4 G/DL (ref 3.5–5.2)
ALP SERPL-CCNC: 103 U/L (ref 35–104)
ALT SERPL-CCNC: 25 U/L (ref 5–33)
ANION GAP SERPL CALCULATED.3IONS-SCNC: 13 MMOL/L (ref 7–19)
AST SERPL-CCNC: 24 U/L (ref 5–32)
BILIRUB SERPL-MCNC: <0.2 MG/DL (ref 0.2–1.2)
BUN SERPL-MCNC: 6 MG/DL (ref 6–20)
CALCIUM SERPL-MCNC: 9.1 MG/DL (ref 8.6–10)
CHLORIDE SERPL-SCNC: 103 MMOL/L (ref 98–111)
CO2 SERPL-SCNC: 23 MMOL/L (ref 22–29)
CREAT SERPL-MCNC: 0.5 MG/DL (ref 0.5–0.9)
CREAT UR-MCNC: 115.7 MG/DL (ref 28–217)
ERYTHROCYTE [DISTWIDTH] IN BLOOD BY AUTOMATED COUNT: 14.1 % (ref 11.5–14.5)
GLUCOSE SERPL-MCNC: 76 MG/DL (ref 74–109)
HCT VFR BLD AUTO: 41.6 % (ref 37–47)
HGB BLD-MCNC: 13.7 G/DL (ref 12–16)
MCH RBC QN AUTO: 29.3 PG (ref 27–31)
MCHC RBC AUTO-ENTMCNC: 32.9 G/DL (ref 33–37)
MCV RBC AUTO: 89.1 FL (ref 81–99)
PLATELET # BLD AUTO: 217 K/UL (ref 130–400)
PMV BLD AUTO: 11.4 FL (ref 9.4–12.3)
POTASSIUM SERPL-SCNC: 4 MMOL/L (ref 3.5–5)
PROT SERPL-MCNC: 6.9 G/DL (ref 6.6–8.7)
PROT UR-MCNC: 19 MG/DL (ref 15–45)
RBC # BLD AUTO: 4.67 M/UL (ref 4.2–5.4)
SODIUM SERPL-SCNC: 139 MMOL/L (ref 136–145)
URATE SERPL-MCNC: 5.8 MG/DL (ref 2.4–5.7)
WBC # BLD AUTO: 10.1 K/UL (ref 4.8–10.8)

## 2024-01-04 PROCEDURE — G8484 FLU IMMUNIZE NO ADMIN: HCPCS | Performed by: OBSTETRICS & GYNECOLOGY

## 2024-01-04 PROCEDURE — G8427 DOCREV CUR MEDS BY ELIG CLIN: HCPCS | Performed by: OBSTETRICS & GYNECOLOGY

## 2024-01-04 PROCEDURE — G8419 CALC BMI OUT NRM PARAM NOF/U: HCPCS | Performed by: OBSTETRICS & GYNECOLOGY

## 2024-01-04 PROCEDURE — 99214 OFFICE O/P EST MOD 30 MIN: CPT | Performed by: OBSTETRICS & GYNECOLOGY

## 2024-01-04 PROCEDURE — 1036F TOBACCO NON-USER: CPT | Performed by: OBSTETRICS & GYNECOLOGY

## 2024-01-04 NOTE — PROGRESS NOTES
Hien Chery is a 23 y.o. female 29w2d who presents for routine prenatal visit.  The patient was seen and evaluated. There was positive fetal movements. No contractions, bleeding or leakage of fluid. Signs and symptoms of  labor as well as labor were reviewed. The S/S of Pre-Eclampsia were reviewed with the patient in detail. She is to report any of these if they occur. She currently denies any of these.  Elevated blood pressure noted, she did not take her BP medication today.         Hien Chery is a 23 y.o. female with the following history as recorded in [facility]:  Patient Active Problem List    Diagnosis Date Noted    Gestational hypertension, second trimester 2023    Blood pressure elevated without history of HTN 2023    Supervision of normal first pregnancy, antepartum 2023    Migraine without aura and without status migrainosus, not intractable 2018    Irregular menses 10/21/2015    Cramp of toe 10/21/2015    Menstrual migraine without status migrainosus, not intractable 10/21/2015     Current Outpatient Medications on File Prior to Visit   Medication Sig Dispense Refill    NIFEdipine (PROCARDIA XL) 30 MG extended release tablet Take 1 tablet by mouth daily 30 tablet 1    Prenatal Vit-Fe Fumarate-FA (PRENATAL VITAMIN) 27-0.8 MG TABS Take 1 tablet by mouth daily 30 tablet 11    ondansetron (ZOFRAN-ODT) 4 MG disintegrating tablet Take 1 tablet by mouth 3 times daily as needed for Nausea or Vomiting 21 tablet 0     No current facility-administered medications on file prior to visit.     Allergies: Patient has no known allergies.  Past Medical History:   Diagnosis Date    Genital herpes      Past Surgical History:   Procedure Laterality Date    EXTERNAL EAR SURGERY Left      Family History   Problem Relation Age of Onset    Other Mother         Irregular menses    Diabetes Maternal Grandmother     Cancer Maternal Grandfather      Social History     Tobacco Use    Smoking

## 2024-01-04 NOTE — PROGRESS NOTES
Patient presents today for routine prenatal visit.Pt denies any vaginal leaking bleeding or contractions. + Fetal movement.     170/106- first reading

## 2024-01-04 NOTE — PATIENT INSTRUCTIONS
Patient Education        Weeks 26 to 30 of Your Pregnancy: Care Instructions  You're starting your last trimester. You'll probably feel your baby moving around more. Your back may ache as your body gets used to your baby's size and length. Take care of yourself, and pay attention to what your body needs.    Talk to your doctor about getting the Tdap shot. It will help protect your  against whooping cough (pertussis). Also ask your doctor about flu and COVID-19 shots if you haven't had them yet. If your blood type is Rh negative, you may be given a shot of Rh immune globulin (such as RhoGAM). It can help prevent problems for your baby.   You may have [place] contractions. They are single or several strong contractions without a pattern. These are practice contractions but not the start of labor.   Be kind to yourself.     Take breaks when you're tired.  Change positions often. Don't sit for too long or stand for too long.  At work, rest during breaks if you can. If you don't get breaks, talk to your doctor about writing a letter to your employer to request them.  Avoid fumes, chemicals, and tobacco smoke.  Be sexual if you want to.     You may be interested in sex, or you may not. Everyone is different.  Sex is okay unless your doctor tells you not to.  Your belly can make it hard to find good positions for sex. Prosser and explore.  Watch for signs of  labor.    These signs include:   Menstrual-like cramps. Or you may have pain or pressure in your pelvis that happens in a pattern.  About 6 or more contractions in an hour (even after rest and a glass of water).  A low, dull backache that doesn't go away when you change positions.  An increase or change in vaginal discharge.  Light vaginal bleeding or spotting.  Your water breaking.  Know what to do if you think you are having contractions.     Drink 1 or 2 glasses of water.  Lie down on your left side for at least an hour.  While on your side,

## 2024-01-08 ENCOUNTER — TELEPHONE (OUTPATIENT)
Dept: OBGYN CLINIC | Age: 24
End: 2024-01-08

## 2024-01-08 ENCOUNTER — ROUTINE PRENATAL (OUTPATIENT)
Dept: OBGYN CLINIC | Age: 24
End: 2024-01-08
Payer: MEDICAID

## 2024-01-08 VITALS
SYSTOLIC BLOOD PRESSURE: 149 MMHG | BODY MASS INDEX: 26.46 KG/M2 | WEIGHT: 131 LBS | DIASTOLIC BLOOD PRESSURE: 95 MMHG | HEART RATE: 103 BPM

## 2024-01-08 DIAGNOSIS — Z3A.30 30 WEEKS GESTATION OF PREGNANCY: ICD-10-CM

## 2024-01-08 DIAGNOSIS — O09.90 SUPERVISION OF HIGH RISK PREGNANCY, ANTEPARTUM: Primary | ICD-10-CM

## 2024-01-08 DIAGNOSIS — O13.3 GESTATIONAL HYPERTENSION, THIRD TRIMESTER: ICD-10-CM

## 2024-01-08 DIAGNOSIS — Z34.93 PRENATAL CARE IN THIRD TRIMESTER: ICD-10-CM

## 2024-01-08 DIAGNOSIS — O36.5920 POOR FETAL GROWTH AFFECTING MANAGEMENT OF MOTHER IN SECOND TRIMESTER, SINGLE OR UNSPECIFIED FETUS: ICD-10-CM

## 2024-01-08 PROCEDURE — 1036F TOBACCO NON-USER: CPT | Performed by: OBSTETRICS & GYNECOLOGY

## 2024-01-08 PROCEDURE — G8427 DOCREV CUR MEDS BY ELIG CLIN: HCPCS | Performed by: OBSTETRICS & GYNECOLOGY

## 2024-01-08 PROCEDURE — G8484 FLU IMMUNIZE NO ADMIN: HCPCS | Performed by: OBSTETRICS & GYNECOLOGY

## 2024-01-08 PROCEDURE — 99214 OFFICE O/P EST MOD 30 MIN: CPT | Performed by: OBSTETRICS & GYNECOLOGY

## 2024-01-08 PROCEDURE — G8419 CALC BMI OUT NRM PARAM NOF/U: HCPCS | Performed by: OBSTETRICS & GYNECOLOGY

## 2024-01-08 NOTE — PROGRESS NOTES
Patient presents today for routine prenatal care. Pt denies any vaginal leaking bleeding or contractions. + Fetal movement.     
was instructed to notify the office if she did not make that target after two attempts or if after any attempt there was less than four movements.    The patient reports that the targets have been made Yes.    Assessment:   Diagnosis Orders   1. Supervision of high risk pregnancy, antepartum        2. Prenatal care in third trimester        3. 30 weeks gestation of pregnancy        4. Poor fetal growth affecting management of mother in second trimester, single or unspecified fetus        5. Gestational hypertension, third trimester                  Plan:  1. PTL precautions   2. Return in 2 weeks  3. NST today  4. Procardia increased to BID.  Reiterated importance of compliance  5. Recommended to stop working.  6. Reviewed risks of uncontrolled HTN including PTL, PTD, abruption, stroke and eclampsia.    NST In-office Documentation:  Indication: IUGR, elevated dopplers  Baseline rate: 145  Variability: moderate  Accelerations: Present x 2  Decelerations: Episodic variable  Reactive: yes  Length of tracin min    I Yas Leiva, am scribing for and in the presence of Dr. Renata Whitney.   I, Dr. Renata Whitney, personally performed the services described in this documentation as scribed by Yas Leiva in my presence, and it is both accurate and complete.           Total visit time 30 min

## 2024-01-08 NOTE — TELEPHONE ENCOUNTER
Called [facility] to see if pt had been scheduled for an apt for GHTN.  confirmed that pt was seen today at 8:45 am at their office.   Maegan BRIONES

## 2024-01-08 NOTE — TELEPHONE ENCOUNTER
----- Message from Renata Ibarra MD sent at 12/30/2023 12:04 AM CST -----  Could you please schedule Hien an appt with Dr Conley for gestational Hypertension

## 2024-01-08 NOTE — PATIENT INSTRUCTIONS
Patient Education        Weeks 30 to 32 of Your Pregnancy: Care Instructions  Your baby is growing more every day. Its eyes can open and close, and it may have hair on its head. Your baby may sleep 20 to 45 minutes at a time and is more active at certain times.    You should feel your baby move several times every day. Your baby now turns less and kicks more.   This is a good time to tour your hospital or birthing center. You may also want to find childcare if needed.     To ease heartburn    Avoid foods that make your symptoms worse, such as chocolate, spicy foods, and caffeine.  Avoid bending over or lying down after meals.  Do not eat for 2 hours before bedtime.  Take antacids like Tums, but don't take ones that have sodium bicarbonate, magnesium trisilicate, or aspirin.    To care for large, swollen veins (varicose veins)    Try to avoid standing for long periods of time.  Sit with your feet propped up.  Wear support hose.  Get some exercise every day, like walking or swimming.  Counting your baby's kicks  Your doctor may ask you to count your baby's movements, such as kicks, flutters, or rolls.    Find a quiet place, and get comfortable. Write down your start time. Count your baby's movements (except hiccups). When your baby has moved 10 times, you can stop counting. Write down how many minutes it took.   If an hour goes by and you don't feel 10 movements, have something to eat or drink. Count for another hour. If you don't feel at least 10 movements in the 2-hour period, call your doctor.   Follow-up care is a key part of your treatment and safety. Be sure to make and go to all appointments, and call your doctor if you are having problems. It's also a good idea to know your test results and keep a list of the medicines you take.  Where can you learn more?  Go to https://www.Coreworkswise.net/patientEd and enter X471 to learn more about \"Weeks 30 to 32 of Your Pregnancy: Care Instructions.\"  Current as of: July

## 2024-01-15 ENCOUNTER — TELEPHONE (OUTPATIENT)
Dept: OBGYN CLINIC | Age: 24
End: 2024-01-15

## 2024-01-15 NOTE — TELEPHONE ENCOUNTER
Patient needs R/S er PN appointment that was today due to weather. Patient suppose to see Dr Devonte Ibarra every week. Please called the patient.      Thank You

## 2024-01-16 ENCOUNTER — HOSPITAL ENCOUNTER (OUTPATIENT)
Facility: HOSPITAL | Age: 24
Discharge: HOME | End: 2024-01-17
Attending: OBSTETRICS & GYNECOLOGY | Admitting: OBSTETRICS & GYNECOLOGY
Payer: COMMERCIAL

## 2024-01-16 ENCOUNTER — APPOINTMENT (OUTPATIENT)
Dept: ULTRASOUND IMAGING | Age: 24
End: 2024-01-16
Payer: MEDICAID

## 2024-01-16 ENCOUNTER — ANESTHESIA EVENT (OUTPATIENT)
Dept: LABOR AND DELIVERY | Facility: HOSPITAL | Age: 24
End: 2024-01-16
Payer: COMMERCIAL

## 2024-01-16 ENCOUNTER — HOSPITAL ENCOUNTER (OUTPATIENT)
Age: 24
Setting detail: OBSERVATION
Discharge: OTHER INSTITUTION | End: 2024-01-16
Attending: OBSTETRICS & GYNECOLOGY | Admitting: OBSTETRICS & GYNECOLOGY
Payer: MEDICAID

## 2024-01-16 ENCOUNTER — ANESTHESIA (OUTPATIENT)
Dept: LABOR AND DELIVERY | Facility: HOSPITAL | Age: 24
End: 2024-01-16
Payer: COMMERCIAL

## 2024-01-16 VITALS
HEART RATE: 92 BPM | WEIGHT: 131 LBS | OXYGEN SATURATION: 100 % | SYSTOLIC BLOOD PRESSURE: 141 MMHG | BODY MASS INDEX: 26.41 KG/M2 | TEMPERATURE: 97.8 F | HEIGHT: 59 IN | RESPIRATION RATE: 16 BRPM | DIASTOLIC BLOOD PRESSURE: 93 MMHG

## 2024-01-16 PROBLEM — O14.93 PRE-ECLAMPSIA IN THIRD TRIMESTER: Status: ACTIVE | Noted: 2024-01-16

## 2024-01-16 PROBLEM — Z3A.31 31 WEEKS GESTATION OF PREGNANCY: Status: ACTIVE | Noted: 2024-01-16

## 2024-01-16 LAB
ABO GROUP BLD: NORMAL
ABO/RH: NORMAL
ALBUMIN SERPL-MCNC: 3.8 G/DL (ref 3.5–5.2)
ALBUMIN SERPL-MCNC: 3.8 G/DL (ref 3.5–5.2)
ALBUMIN/GLOB SERPL: 1.3 G/DL
ALP SERPL-CCNC: 113 U/L (ref 35–104)
ALP SERPL-CCNC: 113 U/L (ref 39–117)
ALT SERPL W P-5'-P-CCNC: 37 U/L (ref 1–33)
ALT SERPL-CCNC: 35 U/L (ref 5–33)
AMPHET UR QL SCN: NEGATIVE
ANION GAP SERPL CALCULATED.3IONS-SCNC: 12 MMOL/L (ref 7–19)
ANION GAP SERPL CALCULATED.3IONS-SCNC: 13 MMOL/L (ref 5–15)
ANTIBODY SCREEN: NORMAL
AST SERPL-CCNC: 37 U/L (ref 1–32)
AST SERPL-CCNC: 37 U/L (ref 5–32)
BACTERIA SPEC QL WET PREP: ABNORMAL
BARBITURATES UR QL SCN: NEGATIVE
BASOPHILS # BLD AUTO: 0.04 10*3/MM3 (ref 0–0.2)
BASOPHILS # BLD: 0 K/UL (ref 0–0.2)
BASOPHILS NFR BLD AUTO: 0.2 % (ref 0–1.5)
BASOPHILS NFR BLD: 0.4 % (ref 0–1)
BENZODIAZ UR QL SCN: NEGATIVE
BILIRUB SERPL-MCNC: 0.2 MG/DL (ref 0–1.2)
BILIRUB SERPL-MCNC: <0.2 MG/DL (ref 0.2–1.2)
BLD GP AB SCN SERPL QL: NEGATIVE
BUN SERPL-MCNC: 8 MG/DL (ref 6–20)
BUN SERPL-MCNC: 9 MG/DL (ref 6–20)
BUN/CREAT SERPL: 16 (ref 7–25)
BUPRENORPHINE URINE: NEGATIVE
CALCIUM SERPL-MCNC: 9.9 MG/DL (ref 8.6–10)
CALCIUM SPEC-SCNC: 9.8 MG/DL (ref 8.6–10.5)
CANNABINOIDS UR QL SCN: NEGATIVE
CHLORIDE SERPL-SCNC: 102 MMOL/L (ref 98–107)
CHLORIDE SERPL-SCNC: 103 MMOL/L (ref 98–111)
CLUE CELLS VAG QL WET PREP: ABNORMAL
CO2 SERPL-SCNC: 21 MMOL/L (ref 22–29)
CO2 SERPL-SCNC: 23 MMOL/L (ref 22–29)
COCAINE UR QL SCN: NEGATIVE
CREAT SERPL-MCNC: 0.5 MG/DL (ref 0.57–1)
CREAT SERPL-MCNC: 0.5 MG/DL (ref 0.5–0.9)
CREAT UR-MCNC: 40.6 MG/DL (ref 28–217)
DEPRECATED RDW RBC AUTO: 41.4 FL (ref 37–54)
DRUG SCREEN COMMENT UR-IMP: NORMAL
EGFRCR SERPLBLD CKD-EPI 2021: 136.2 ML/MIN/1.73
EOSINOPHIL # BLD AUTO: 0.01 10*3/MM3 (ref 0–0.4)
EOSINOPHIL # BLD: 0 K/UL (ref 0–0.6)
EOSINOPHIL NFR BLD AUTO: 0.1 % (ref 0.3–6.2)
EOSINOPHIL NFR BLD: 0.3 % (ref 0–5)
EPI CELLS VAG QL WET PREP: ABNORMAL
ERYTHROCYTE [DISTWIDTH] IN BLOOD BY AUTOMATED COUNT: 13.2 % (ref 11.5–14.5)
ERYTHROCYTE [DISTWIDTH] IN BLOOD BY AUTOMATED COUNT: 13.2 % (ref 12.3–15.4)
EXTERNAL AMPHETAMINE SCREEN URINE: NEGATIVE
EXTERNAL BARBITURATE SCREEN URINE: NEGATIVE
EXTERNAL BENZODIAZEPINE SCREEN URINE: NEGATIVE
EXTERNAL BUPRENORPHINE SCREEN URINE: NEGATIVE
EXTERNAL OPIATES SCREEN URINE: NEGATIVE
EXTERNAL THC SCREEN URINE: NEGATIVE
EXTERNAL URINE DRUG SCREEN: NEGATIVE
FENTANYL SCREEN, URINE: NEGATIVE
GLOBULIN UR ELPH-MCNC: 3 GM/DL
GLUCOSE BLDC GLUCOMTR-MCNC: 75 MG/DL (ref 70–130)
GLUCOSE SERPL-MCNC: 76 MG/DL (ref 74–109)
GLUCOSE SERPL-MCNC: 80 MG/DL (ref 65–99)
HCT VFR BLD AUTO: 41.3 % (ref 34–46.6)
HCT VFR BLD AUTO: 41.6 % (ref 37–47)
HGB BLD-MCNC: 13.9 G/DL (ref 12–15.9)
HGB BLD-MCNC: 14.1 G/DL (ref 12–16)
IMM GRANULOCYTES # BLD AUTO: 0.11 10*3/MM3 (ref 0–0.05)
IMM GRANULOCYTES # BLD: 0.1 K/UL
IMM GRANULOCYTES NFR BLD AUTO: 0.7 % (ref 0–0.5)
LDH SERPL-CCNC: 208 U/L (ref 135–214)
LYMPHOCYTES # BLD AUTO: 2.91 10*3/MM3 (ref 0.7–3.1)
LYMPHOCYTES # BLD: 2.7 K/UL (ref 1.1–4.5)
LYMPHOCYTES NFR BLD AUTO: 18.2 % (ref 19.6–45.3)
LYMPHOCYTES NFR BLD: 24 % (ref 20–40)
MCH RBC QN AUTO: 29 PG (ref 26.6–33)
MCH RBC QN AUTO: 29.4 PG (ref 27–31)
MCHC RBC AUTO-ENTMCNC: 33.7 G/DL (ref 31.5–35.7)
MCHC RBC AUTO-ENTMCNC: 33.9 G/DL (ref 33–37)
MCV RBC AUTO: 86.2 FL (ref 79–97)
MCV RBC AUTO: 86.8 FL (ref 81–99)
METHADONE UR QL SCN: NEGATIVE
METHAMPHETAMINE, URINE: NEGATIVE
MONOCYTES # BLD AUTO: 0.82 10*3/MM3 (ref 0.1–0.9)
MONOCYTES # BLD: 0.8 K/UL (ref 0–0.9)
MONOCYTES NFR BLD AUTO: 5.1 % (ref 5–12)
MONOCYTES NFR BLD: 6.9 % (ref 0–10)
NEUTROPHILS # BLD: 7.7 K/UL (ref 1.5–7.5)
NEUTROPHILS NFR BLD AUTO: 12.14 10*3/MM3 (ref 1.7–7)
NEUTROPHILS NFR BLD AUTO: 75.7 % (ref 42.7–76)
NEUTS SEG NFR BLD: 67.7 % (ref 50–65)
NRBC BLD AUTO-RTO: 0 /100 WBC (ref 0–0.2)
OPIATES UR QL SCN: NEGATIVE
OXYCODONE UR QL SCN: NEGATIVE
PCP UR QL SCN: NEGATIVE
PLATELET # BLD AUTO: 178 10*3/MM3 (ref 140–450)
PLATELET # BLD AUTO: 178 K/UL (ref 130–400)
PMV BLD AUTO: 11.1 FL (ref 6–12)
PMV BLD AUTO: 11.2 FL (ref 9.4–12.3)
POTASSIUM SERPL-SCNC: 3.9 MMOL/L (ref 3.5–5)
POTASSIUM SERPL-SCNC: 4.2 MMOL/L (ref 3.5–5.2)
PROT SERPL-MCNC: 6.7 G/DL (ref 6.6–8.7)
PROT SERPL-MCNC: 6.8 G/DL (ref 6–8.5)
PROT UR-MCNC: 234 MG/DL (ref 15–45)
RBC # BLD AUTO: 4.79 10*6/MM3 (ref 3.77–5.28)
RBC # BLD AUTO: 4.79 M/UL (ref 4.2–5.4)
RBC VAG QL: ABNORMAL
RH BLD: POSITIVE
SODIUM SERPL-SCNC: 136 MMOL/L (ref 136–145)
SODIUM SERPL-SCNC: 138 MMOL/L (ref 136–145)
SPECIMEN SOURCE FLD: ABNORMAL
T VAGINALIS VAG QL WET PREP: ABNORMAL
T&S EXPIRATION DATE: NORMAL
TRICYCLIC, URINE: NEGATIVE
URATE SERPL-MCNC: 6.4 MG/DL (ref 2.4–5.7)
URATE SERPL-MCNC: 6.8 MG/DL (ref 2.4–5.7)
WBC # BLD AUTO: 11.4 K/UL (ref 4.8–10.8)
WBC NRBC COR # BLD AUTO: 16.03 10*3/MM3 (ref 3.4–10.8)
WBC VAG QL WET PREP: ABNORMAL
YEAST VAG QL WET PREP: ABNORMAL

## 2024-01-16 PROCEDURE — G0378 HOSPITAL OBSERVATION PER HR: HCPCS

## 2024-01-16 PROCEDURE — G0480 DRUG TEST DEF 1-7 CLASSES: HCPCS

## 2024-01-16 PROCEDURE — 25810000003 LACTATED RINGERS PER 1000 ML: Performed by: OBSTETRICS & GYNECOLOGY

## 2024-01-16 PROCEDURE — 86900 BLOOD TYPING SEROLOGIC ABO: CPT

## 2024-01-16 PROCEDURE — G0379 DIRECT REFER HOSPITAL OBSERV: HCPCS

## 2024-01-16 PROCEDURE — 86850 RBC ANTIBODY SCREEN: CPT | Performed by: OBSTETRICS & GYNECOLOGY

## 2024-01-16 PROCEDURE — 84156 ASSAY OF PROTEIN URINE: CPT

## 2024-01-16 PROCEDURE — 82570 ASSAY OF URINE CREATININE: CPT

## 2024-01-16 PROCEDURE — 84550 ASSAY OF BLOOD/URIC ACID: CPT

## 2024-01-16 PROCEDURE — 80053 COMPREHEN METABOLIC PANEL: CPT

## 2024-01-16 PROCEDURE — 96365 THER/PROPH/DIAG IV INF INIT: CPT

## 2024-01-16 PROCEDURE — 85025 COMPLETE CBC W/AUTO DIFF WBC: CPT | Performed by: OBSTETRICS & GYNECOLOGY

## 2024-01-16 PROCEDURE — 80307 DRUG TEST PRSMV CHEM ANLYZR: CPT

## 2024-01-16 PROCEDURE — 83615 LACTATE (LD) (LDH) ENZYME: CPT | Performed by: OBSTETRICS & GYNECOLOGY

## 2024-01-16 PROCEDURE — 36415 COLL VENOUS BLD VENIPUNCTURE: CPT

## 2024-01-16 PROCEDURE — 96361 HYDRATE IV INFUSION ADD-ON: CPT

## 2024-01-16 PROCEDURE — 86901 BLOOD TYPING SEROLOGIC RH(D): CPT

## 2024-01-16 PROCEDURE — 80053 COMPREHEN METABOLIC PANEL: CPT | Performed by: OBSTETRICS & GYNECOLOGY

## 2024-01-16 PROCEDURE — 86850 RBC ANTIBODY SCREEN: CPT

## 2024-01-16 PROCEDURE — 99223 1ST HOSP IP/OBS HIGH 75: CPT | Performed by: OBSTETRICS & GYNECOLOGY

## 2024-01-16 PROCEDURE — 6360000002 HC RX W HCPCS: Performed by: OBSTETRICS & GYNECOLOGY

## 2024-01-16 PROCEDURE — 76819 FETAL BIOPHYS PROFIL W/O NST: CPT

## 2024-01-16 PROCEDURE — 86900 BLOOD TYPING SEROLOGIC ABO: CPT | Performed by: OBSTETRICS & GYNECOLOGY

## 2024-01-16 PROCEDURE — 96376 TX/PRO/DX INJ SAME DRUG ADON: CPT

## 2024-01-16 PROCEDURE — 84550 ASSAY OF BLOOD/URIC ACID: CPT | Performed by: OBSTETRICS & GYNECOLOGY

## 2024-01-16 PROCEDURE — 96367 TX/PROPH/DG ADDL SEQ IV INF: CPT

## 2024-01-16 PROCEDURE — 96374 THER/PROPH/DIAG INJ IV PUSH: CPT

## 2024-01-16 PROCEDURE — 96360 HYDRATION IV INFUSION INIT: CPT

## 2024-01-16 PROCEDURE — 6370000000 HC RX 637 (ALT 250 FOR IP): Performed by: OBSTETRICS & GYNECOLOGY

## 2024-01-16 PROCEDURE — 86901 BLOOD TYPING SEROLOGIC RH(D): CPT | Performed by: OBSTETRICS & GYNECOLOGY

## 2024-01-16 PROCEDURE — 82948 REAGENT STRIP/BLOOD GLUCOSE: CPT

## 2024-01-16 PROCEDURE — 85025 COMPLETE CBC W/AUTO DIFF WBC: CPT

## 2024-01-16 PROCEDURE — 99213 OFFICE O/P EST LOW 20 MIN: CPT

## 2024-01-16 PROCEDURE — G0463 HOSPITAL OUTPT CLINIC VISIT: HCPCS

## 2024-01-16 PROCEDURE — 59025 FETAL NON-STRESS TEST: CPT

## 2024-01-16 RX ORDER — LABETALOL 200 MG/1
200 TABLET, FILM COATED ORAL 2 TIMES DAILY
Status: DISCONTINUED | OUTPATIENT
Start: 2024-01-16 | End: 2024-01-16 | Stop reason: HOSPADM

## 2024-01-16 RX ORDER — LABETALOL 200 MG/1
200 TABLET, FILM COATED ORAL 2 TIMES DAILY
Status: DISCONTINUED | OUTPATIENT
Start: 2024-01-16 | End: 2024-01-16

## 2024-01-16 RX ORDER — NIFEDIPINE 30 MG/1
30 TABLET, EXTENDED RELEASE ORAL ONCE
Status: COMPLETED | OUTPATIENT
Start: 2024-01-16 | End: 2024-01-16

## 2024-01-16 RX ORDER — NIFEDIPINE 60 MG/1
30 TABLET, EXTENDED RELEASE ORAL 2 TIMES DAILY
COMMUNITY
End: 2024-01-20 | Stop reason: HOSPADM

## 2024-01-16 RX ORDER — METRONIDAZOLE 500 MG/1
500 TABLET ORAL 2 TIMES DAILY
Status: DISCONTINUED | OUTPATIENT
Start: 2024-01-16 | End: 2024-01-16 | Stop reason: HOSPADM

## 2024-01-16 RX ORDER — ACETAMINOPHEN 500 MG
500 TABLET ORAL ONCE
Status: COMPLETED | OUTPATIENT
Start: 2024-01-16 | End: 2024-01-16

## 2024-01-16 RX ORDER — SODIUM CHLORIDE 0.9 % (FLUSH) 0.9 %
10 SYRINGE (ML) INJECTION EVERY 12 HOURS SCHEDULED
Status: DISCONTINUED | OUTPATIENT
Start: 2024-01-16 | End: 2024-01-17 | Stop reason: HOSPADM

## 2024-01-16 RX ORDER — LABETALOL HYDROCHLORIDE 5 MG/ML
20 INJECTION, SOLUTION INTRAVENOUS ONCE
Status: COMPLETED | OUTPATIENT
Start: 2024-01-16 | End: 2024-01-16

## 2024-01-16 RX ORDER — PRENATAL VIT NO.126/IRON/FOLIC 28MG-0.8MG
TABLET ORAL DAILY
COMMUNITY

## 2024-01-16 RX ORDER — SODIUM CHLORIDE 0.9 % (FLUSH) 0.9 %
10 SYRINGE (ML) INJECTION AS NEEDED
Status: DISCONTINUED | OUTPATIENT
Start: 2024-01-16 | End: 2024-01-17 | Stop reason: HOSPADM

## 2024-01-16 RX ORDER — SODIUM CHLORIDE, SODIUM LACTATE, POTASSIUM CHLORIDE, CALCIUM CHLORIDE 600; 310; 30; 20 MG/100ML; MG/100ML; MG/100ML; MG/100ML
75 INJECTION, SOLUTION INTRAVENOUS CONTINUOUS
Status: DISCONTINUED | OUTPATIENT
Start: 2024-01-16 | End: 2024-01-17 | Stop reason: HOSPADM

## 2024-01-16 RX ORDER — NIFEDIPINE 30 MG/1
60 TABLET, EXTENDED RELEASE ORAL 2 TIMES DAILY
Status: DISCONTINUED | OUTPATIENT
Start: 2024-01-16 | End: 2024-01-16 | Stop reason: HOSPADM

## 2024-01-16 RX ORDER — LABETALOL HYDROCHLORIDE 5 MG/ML
40 INJECTION, SOLUTION INTRAVENOUS ONCE
Status: COMPLETED | OUTPATIENT
Start: 2024-01-16 | End: 2024-01-16

## 2024-01-16 RX ADMIN — ACETAMINOPHEN 500 MG: 500 TABLET, FILM COATED ORAL at 20:18

## 2024-01-16 RX ADMIN — LABETALOL HYDROCHLORIDE 40 MG: 5 INJECTION, SOLUTION INTRAVENOUS at 15:34

## 2024-01-16 RX ADMIN — NIFEDIPINE 30 MG: 30 TABLET, EXTENDED RELEASE ORAL at 15:32

## 2024-01-16 RX ADMIN — LABETALOL HYDROCHLORIDE 20 MG: 5 INJECTION, SOLUTION INTRAVENOUS at 14:40

## 2024-01-16 RX ADMIN — METRONIDAZOLE 500 MG: 500 TABLET ORAL at 17:26

## 2024-01-16 RX ADMIN — SODIUM CHLORIDE, POTASSIUM CHLORIDE, SODIUM LACTATE AND CALCIUM CHLORIDE 75 ML/HR: 600; 310; 30; 20 INJECTION, SOLUTION INTRAVENOUS at 19:46

## 2024-01-16 ASSESSMENT — ENCOUNTER SYMPTOMS
RESPIRATORY NEGATIVE: 1
EYES NEGATIVE: 1
ALLERGIC/IMMUNOLOGIC NEGATIVE: 1
GASTROINTESTINAL NEGATIVE: 1

## 2024-01-16 NOTE — PROGRESS NOTES
Pt sent over from [facility] with concerns of high blood pressure. Pt denies any vaginal bleeding or lof. Pt confirms +FM. Efm and toco applied to soft, non-tender abdomen. Orders given per . Continuing to monitor.

## 2024-01-16 NOTE — H&P
Hien Chery is a 23 y.o. female patient.  No diagnosis found.  Past Medical History:   Diagnosis Date    Genital herpes      OB History          1    Para   0    Term   0       0    AB   0    Living   0         SAB   0    IAB   0    Ectopic   0    Molar   0    Multiple   0    Live Births   0              31w1d  Estimated Date of Delivery: 3/18/24  No Known Allergies  Principal Problem:    31 weeks gestation of pregnancy  Resolved Problems:    * No resolved hospital problems. *    Blood pressure (!) 130/93, pulse 83, temperature 97.8 °F (36.6 °C), resp. rate 16, height 1.499 m (4' 11\"), weight 59.4 kg (131 lb), last menstrual period 2023, SpO2 100 %, not currently breastfeeding.    History  Exam  Assessment:  1.  IUP at 31 1/7 wks  2.  Preeclampsia  3.  IUGR  4.  Vaginal irritation    Plan:  1.  Admit to L&D  2.  Increase Procardia to 60 mg BID  3.  Will add Labetalol for improved control if BP remain elevated and not worsening clinically.  4.  PIH labs q 12 hours  5.  SCD's bilaterally  6.  NST q shift  7.  Wet prep collected      Renata CHIQUITA Ibarra MD  2024

## 2024-01-17 ENCOUNTER — HOSPITAL ENCOUNTER (INPATIENT)
Facility: HOSPITAL | Age: 24
LOS: 3 days | Discharge: HOME | End: 2024-01-20
Attending: OBSTETRICS & GYNECOLOGY | Admitting: OBSTETRICS & GYNECOLOGY
Payer: COMMERCIAL

## 2024-01-17 VITALS
HEIGHT: 59 IN | TEMPERATURE: 98.2 F | BODY MASS INDEX: 27.01 KG/M2 | RESPIRATION RATE: 18 BRPM | SYSTOLIC BLOOD PRESSURE: 132 MMHG | WEIGHT: 134 LBS | HEART RATE: 86 BPM | DIASTOLIC BLOOD PRESSURE: 82 MMHG

## 2024-01-17 DIAGNOSIS — Z98.891 S/P C-SECTION: Primary | ICD-10-CM

## 2024-01-17 PROBLEM — Z34.90 PREGNANT: Status: ACTIVE | Noted: 2024-01-17

## 2024-01-17 PROBLEM — O14.90 PRE-ECLAMPSIA: Status: ACTIVE | Noted: 2024-01-17

## 2024-01-17 LAB
ALBUMIN SERPL-MCNC: 4.2 G/DL (ref 3.5–5.2)
ALBUMIN/GLOB SERPL: 1.3 G/DL
ALP SERPL-CCNC: 123 U/L (ref 39–117)
ALT SERPL W P-5'-P-CCNC: 39 U/L (ref 1–33)
ANION GAP SERPL CALCULATED.3IONS-SCNC: 13 MMOL/L (ref 5–15)
AST SERPL-CCNC: 38 U/L (ref 1–32)
BILIRUB SERPL-MCNC: 0.2 MG/DL (ref 0–1.2)
BUN SERPL-MCNC: 9 MG/DL (ref 6–20)
BUN/CREAT SERPL: 15.8 (ref 7–25)
CALCIUM SPEC-SCNC: 9.7 MG/DL (ref 8.6–10.5)
CHLORIDE SERPL-SCNC: 100 MMOL/L (ref 98–107)
CO2 SERPL-SCNC: 23 MMOL/L (ref 22–29)
COLLECT DURATION TIME UR: 24 HRS
CREAT SERPL-MCNC: 0.57 MG/DL (ref 0.57–1)
DEPRECATED RDW RBC AUTO: 42.4 FL (ref 37–54)
EGFRCR SERPLBLD CKD-EPI 2021: 132 ML/MIN/1.73
ERYTHROCYTE [DISTWIDTH] IN BLOOD BY AUTOMATED COUNT: 13.6 % (ref 12.3–15.4)
GLOBULIN UR ELPH-MCNC: 3.2 GM/DL
GLUCOSE SERPL-MCNC: 120 MG/DL (ref 65–99)
HCT VFR BLD AUTO: 41.6 % (ref 34–46.6)
HGB BLD-MCNC: 14 G/DL (ref 12–15.9)
LDH SERPL-CCNC: 237 U/L (ref 135–214)
LYMPHOCYTES # BLD MANUAL: 1.63 10*3/MM3 (ref 0.7–3.1)
LYMPHOCYTES NFR BLD MANUAL: 3.4 % (ref 5–12)
MCH RBC QN AUTO: 28.8 PG (ref 26.6–33)
MCHC RBC AUTO-ENTMCNC: 33.7 G/DL (ref 31.5–35.7)
MCV RBC AUTO: 85.6 FL (ref 79–97)
MONOCYTES # BLD: 0.48 10*3/MM3 (ref 0.1–0.9)
NEUTROPHILS # BLD AUTO: 12.08 10*3/MM3 (ref 1.7–7)
NEUTROPHILS NFR BLD MANUAL: 85.1 % (ref 42.7–76)
NEUTS VAC BLD QL SMEAR: ABNORMAL
PLAT MORPH BLD: NORMAL
PLATELET # BLD AUTO: 186 10*3/MM3 (ref 140–450)
PMV BLD AUTO: 11.2 FL (ref 6–12)
POTASSIUM SERPL-SCNC: 4 MMOL/L (ref 3.5–5.2)
PROT 24H UR-MRATE: 1511.3 MG/24HOURS (ref 0–150)
PROT SERPL-MCNC: 7.4 G/DL (ref 6–8.5)
RBC # BLD AUTO: 4.86 10*6/MM3 (ref 3.77–5.28)
RBC MORPH BLD: NORMAL
SODIUM SERPL-SCNC: 136 MMOL/L (ref 136–145)
SPECIMEN VOL 24H UR: 1950 ML
URATE SERPL-MCNC: 6.5 MG/DL (ref 2.4–5.7)
VARIANT LYMPHS NFR BLD MANUAL: 2.3 % (ref 0–5)
VARIANT LYMPHS NFR BLD MANUAL: 9.2 % (ref 19.6–45.3)
WBC NRBC COR # BLD AUTO: 14.2 10*3/MM3 (ref 3.4–10.8)

## 2024-01-17 PROCEDURE — 81050 URINALYSIS VOLUME MEASURE: CPT | Performed by: OBSTETRICS & GYNECOLOGY

## 2024-01-17 PROCEDURE — 25010000002 BETAMETHASONE SODIUM PHOSPHATE 12 MG/2ML SOLUTION: Performed by: OBSTETRICS & GYNECOLOGY

## 2024-01-17 PROCEDURE — 25810000003 LACTATED RINGERS PER 1000 ML: Performed by: OBSTETRICS & GYNECOLOGY

## 2024-01-17 PROCEDURE — 83615 LACTATE (LD) (LDH) ENZYME: CPT | Performed by: OBSTETRICS & GYNECOLOGY

## 2024-01-17 PROCEDURE — 84156 ASSAY OF PROTEIN URINE: CPT | Performed by: OBSTETRICS & GYNECOLOGY

## 2024-01-17 PROCEDURE — 84550 ASSAY OF BLOOD/URIC ACID: CPT | Performed by: OBSTETRICS & GYNECOLOGY

## 2024-01-17 PROCEDURE — 96372 THER/PROPH/DIAG INJ SC/IM: CPT

## 2024-01-17 PROCEDURE — 85007 BL SMEAR W/DIFF WBC COUNT: CPT | Performed by: OBSTETRICS & GYNECOLOGY

## 2024-01-17 PROCEDURE — G0378 HOSPITAL OBSERVATION PER HR: HCPCS

## 2024-01-17 PROCEDURE — 25010000002 CEFAZOLIN PER 500 MG: Performed by: OBSTETRICS & GYNECOLOGY

## 2024-01-17 PROCEDURE — 25010000002 ONDANSETRON PER 1 MG: Performed by: NURSE ANESTHETIST, CERTIFIED REGISTERED

## 2024-01-17 PROCEDURE — 25010000002 BUPIVACAINE PF 0.75 % SOLUTION: Performed by: NURSE ANESTHETIST, CERTIFIED REGISTERED

## 2024-01-17 PROCEDURE — 80053 COMPREHEN METABOLIC PANEL: CPT | Performed by: OBSTETRICS & GYNECOLOGY

## 2024-01-17 PROCEDURE — 25010000002 METOCLOPRAMIDE PER 10 MG: Performed by: OBSTETRICS & GYNECOLOGY

## 2024-01-17 PROCEDURE — G0463 HOSPITAL OUTPT CLINIC VISIT: HCPCS

## 2024-01-17 PROCEDURE — 36415 COLL VENOUS BLD VENIPUNCTURE: CPT | Performed by: OBSTETRICS & GYNECOLOGY

## 2024-01-17 PROCEDURE — 96361 HYDRATE IV INFUSION ADD-ON: CPT

## 2024-01-17 PROCEDURE — 85025 COMPLETE CBC W/AUTO DIFF WBC: CPT | Performed by: OBSTETRICS & GYNECOLOGY

## 2024-01-17 PROCEDURE — 86780 TREPONEMA PALLIDUM: CPT | Performed by: OBSTETRICS & GYNECOLOGY

## 2024-01-17 RX ORDER — FENTANYL CITRATE 50 UG/ML
INJECTION, SOLUTION INTRAMUSCULAR; INTRAVENOUS AS NEEDED
Status: DISCONTINUED | OUTPATIENT
Start: 2024-01-17 | End: 2024-01-18 | Stop reason: SURG

## 2024-01-17 RX ORDER — ACETAMINOPHEN 500 MG
1000 TABLET ORAL ONCE
Status: DISCONTINUED | OUTPATIENT
Start: 2024-01-18 | End: 2024-01-18 | Stop reason: HOSPADM

## 2024-01-17 RX ORDER — SODIUM CHLORIDE 0.9 % (FLUSH) 0.9 %
10 SYRINGE (ML) INJECTION AS NEEDED
Status: DISCONTINUED | OUTPATIENT
Start: 2024-01-17 | End: 2024-01-18

## 2024-01-17 RX ORDER — OXYTOCIN/0.9 % SODIUM CHLORIDE 30/500 ML
999 PLASTIC BAG, INJECTION (ML) INTRAVENOUS ONCE
OUTPATIENT
Start: 2024-01-17 | End: 2024-01-17

## 2024-01-17 RX ORDER — BETAMETHASONE SODIUM PHOSPHATE AND BETAMETHASONE ACETATE 3; 3 MG/ML; MG/ML
12 INJECTION, SUSPENSION INTRA-ARTICULAR; INTRALESIONAL; INTRAMUSCULAR; SOFT TISSUE EVERY 24 HOURS
Status: DISCONTINUED | OUTPATIENT
Start: 2024-01-17 | End: 2024-01-17 | Stop reason: SDUPTHER

## 2024-01-17 RX ORDER — SODIUM CHLORIDE 0.9 % (FLUSH) 0.9 %
10 SYRINGE (ML) INJECTION AS NEEDED
Status: DISCONTINUED | OUTPATIENT
Start: 2024-01-17 | End: 2024-01-18 | Stop reason: HOSPADM

## 2024-01-17 RX ORDER — SODIUM CHLORIDE, SODIUM LACTATE, POTASSIUM CHLORIDE, CALCIUM CHLORIDE 600; 310; 30; 20 MG/100ML; MG/100ML; MG/100ML; MG/100ML
125 INJECTION, SOLUTION INTRAVENOUS CONTINUOUS
Status: DISCONTINUED | OUTPATIENT
Start: 2024-01-17 | End: 2024-01-18

## 2024-01-17 RX ORDER — ONDANSETRON 2 MG/ML
4 INJECTION INTRAMUSCULAR; INTRAVENOUS ONCE
Status: DISCONTINUED | OUTPATIENT
Start: 2024-01-18 | End: 2024-01-18 | Stop reason: HOSPADM

## 2024-01-17 RX ORDER — SODIUM CHLORIDE 9 MG/ML
40 INJECTION, SOLUTION INTRAVENOUS AS NEEDED
Status: DISCONTINUED | OUTPATIENT
Start: 2024-01-17 | End: 2024-01-18 | Stop reason: HOSPADM

## 2024-01-17 RX ORDER — ONDANSETRON 2 MG/ML
INJECTION INTRAMUSCULAR; INTRAVENOUS AS NEEDED
Status: DISCONTINUED | OUTPATIENT
Start: 2024-01-17 | End: 2024-01-18 | Stop reason: SURG

## 2024-01-17 RX ORDER — LIDOCAINE 50 MG/G
1 PATCH TOPICAL
Status: DISCONTINUED | OUTPATIENT
Start: 2024-01-18 | End: 2024-01-17 | Stop reason: SDUPTHER

## 2024-01-17 RX ORDER — LIDOCAINE 50 MG/G
1 PATCH TOPICAL
Status: DISCONTINUED | OUTPATIENT
Start: 2024-01-18 | End: 2024-01-18

## 2024-01-17 RX ORDER — ACETAMINOPHEN 500 MG
1000 TABLET ORAL ONCE
Status: COMPLETED | OUTPATIENT
Start: 2024-01-17 | End: 2024-01-17

## 2024-01-17 RX ORDER — FAMOTIDINE 10 MG/ML
20 INJECTION, SOLUTION INTRAVENOUS ONCE
Status: DISCONTINUED | OUTPATIENT
Start: 2024-01-18 | End: 2024-01-18 | Stop reason: HOSPADM

## 2024-01-17 RX ORDER — SODIUM CHLORIDE 0.9 % (FLUSH) 0.9 %
10 SYRINGE (ML) INJECTION EVERY 12 HOURS SCHEDULED
Status: DISCONTINUED | OUTPATIENT
Start: 2024-01-17 | End: 2024-01-18

## 2024-01-17 RX ORDER — BETAMETHASONE SOD PHOSPH-WATER 6 MG/ML
12 VIAL (ML) INJECTION EVERY 24 HOURS
Status: DISCONTINUED | OUTPATIENT
Start: 2024-01-17 | End: 2024-01-17 | Stop reason: HOSPADM

## 2024-01-17 RX ORDER — METOCLOPRAMIDE HYDROCHLORIDE 5 MG/ML
10 INJECTION INTRAMUSCULAR; INTRAVENOUS ONCE
Status: DISCONTINUED | OUTPATIENT
Start: 2024-01-18 | End: 2024-01-18 | Stop reason: HOSPADM

## 2024-01-17 RX ORDER — METOCLOPRAMIDE HYDROCHLORIDE 5 MG/ML
10 INJECTION INTRAMUSCULAR; INTRAVENOUS ONCE
Status: COMPLETED | OUTPATIENT
Start: 2024-01-18 | End: 2024-01-17

## 2024-01-17 RX ORDER — SODIUM CHLORIDE 0.9 % (FLUSH) 0.9 %
10 SYRINGE (ML) INJECTION EVERY 12 HOURS SCHEDULED
Status: DISCONTINUED | OUTPATIENT
Start: 2024-01-18 | End: 2024-01-18 | Stop reason: HOSPADM

## 2024-01-17 RX ORDER — NIFEDIPINE 30 MG/1
30 TABLET, EXTENDED RELEASE ORAL 2 TIMES DAILY
Status: DISCONTINUED | OUTPATIENT
Start: 2024-01-17 | End: 2024-01-18

## 2024-01-17 RX ORDER — OXYTOCIN/0.9 % SODIUM CHLORIDE 30/500 ML
250 PLASTIC BAG, INJECTION (ML) INTRAVENOUS CONTINUOUS
OUTPATIENT
Start: 2024-01-17 | End: 2024-01-17

## 2024-01-17 RX ORDER — CITRIC ACID/SODIUM CITRATE 334-500MG
30 SOLUTION, ORAL ORAL ONCE
Status: DISCONTINUED | OUTPATIENT
Start: 2024-01-18 | End: 2024-01-18 | Stop reason: HOSPADM

## 2024-01-17 RX ORDER — BUPIVACAINE HYDROCHLORIDE 7.5 MG/ML
INJECTION, SOLUTION EPIDURAL; RETROBULBAR AS NEEDED
Status: DISCONTINUED | OUTPATIENT
Start: 2024-01-17 | End: 2024-01-18 | Stop reason: SURG

## 2024-01-17 RX ORDER — SODIUM CHLORIDE, SODIUM LACTATE, POTASSIUM CHLORIDE, CALCIUM CHLORIDE 600; 310; 30; 20 MG/100ML; MG/100ML; MG/100ML; MG/100ML
125 INJECTION, SOLUTION INTRAVENOUS CONTINUOUS
Status: DISCONTINUED | OUTPATIENT
Start: 2024-01-17 | End: 2024-01-20 | Stop reason: HOSPADM

## 2024-01-17 RX ORDER — FAMOTIDINE 10 MG/ML
20 INJECTION, SOLUTION INTRAVENOUS ONCE
Status: COMPLETED | OUTPATIENT
Start: 2024-01-18 | End: 2024-01-17

## 2024-01-17 RX ORDER — NIFEDIPINE 30 MG/1
30 TABLET, EXTENDED RELEASE ORAL EVERY 12 HOURS
Status: DISCONTINUED | OUTPATIENT
Start: 2024-01-17 | End: 2024-01-17 | Stop reason: HOSPADM

## 2024-01-17 RX ORDER — CITRIC ACID/SODIUM CITRATE 334-500MG
30 SOLUTION, ORAL ORAL ONCE
Status: COMPLETED | OUTPATIENT
Start: 2024-01-18 | End: 2024-01-17

## 2024-01-17 RX ADMIN — BUPIVACAINE HYDROCHLORIDE 1.2 MG: 7.5 INJECTION, SOLUTION EPIDURAL; RETROBULBAR at 23:53

## 2024-01-17 RX ADMIN — FAMOTIDINE 20 MG: 10 INJECTION INTRAVENOUS at 23:36

## 2024-01-17 RX ADMIN — SODIUM CHLORIDE, POTASSIUM CHLORIDE, SODIUM LACTATE AND CALCIUM CHLORIDE 125 ML/HR: 600; 310; 30; 20 INJECTION, SOLUTION INTRAVENOUS at 19:37

## 2024-01-17 RX ADMIN — SODIUM CITRATE AND CITRIC ACID MONOHYDRATE 30 ML: 500; 334 SOLUTION ORAL at 23:36

## 2024-01-17 RX ADMIN — Medication 12 MG: at 14:57

## 2024-01-17 RX ADMIN — METOCLOPRAMIDE HYDROCHLORIDE 10 MG: 5 INJECTION INTRAMUSCULAR; INTRAVENOUS at 23:37

## 2024-01-17 RX ADMIN — NIFEDIPINE 30 MG: 30 TABLET, FILM COATED, EXTENDED RELEASE ORAL at 10:26

## 2024-01-17 RX ADMIN — ACETAMINOPHEN 1000 MG: 500 TABLET, FILM COATED ORAL at 23:31

## 2024-01-17 RX ADMIN — SODIUM CHLORIDE, POTASSIUM CHLORIDE, SODIUM LACTATE AND CALCIUM CHLORIDE 125 ML/HR: 600; 310; 30; 20 INJECTION, SOLUTION INTRAVENOUS at 23:40

## 2024-01-17 RX ADMIN — SODIUM CHLORIDE, POTASSIUM CHLORIDE, SODIUM LACTATE AND CALCIUM CHLORIDE 75 ML/HR: 600; 310; 30; 20 INJECTION, SOLUTION INTRAVENOUS at 02:50

## 2024-01-17 RX ADMIN — NIFEDIPINE 30 MG: 30 TABLET, FILM COATED, EXTENDED RELEASE ORAL at 19:38

## 2024-01-17 RX ADMIN — ONDANSETRON 8 MG: 2 INJECTION INTRAMUSCULAR; INTRAVENOUS at 23:47

## 2024-01-17 RX ADMIN — HYDROMORPHONE HYDROCHLORIDE 0.1 MG: 1 INJECTION, SOLUTION INTRAMUSCULAR; INTRAVENOUS; SUBCUTANEOUS at 23:53

## 2024-01-17 RX ADMIN — CEFAZOLIN 2 G: 2 INJECTION, POWDER, FOR SOLUTION INTRAMUSCULAR; INTRAVENOUS at 23:32

## 2024-01-17 RX ADMIN — FENTANYL CITRATE 15 MCG: 50 INJECTION, SOLUTION INTRAMUSCULAR; INTRAVENOUS at 23:53

## 2024-01-17 NOTE — PROGRESS NOTES
Bp wnl,  Ros neg  Denies any headaches or blurry vision  A: stable plan, finish 24 hour urine collection  Plan  steroids, consider discharge

## 2024-01-17 NOTE — FLOWSHEET NOTE
Called to give report to [facility], spoke with Tona.   
[facility] here to get pt  
Pt may come off monitor and be NST q shift per   
Pt off monitor for ultrasound  
locker 2a/on unit

## 2024-01-17 NOTE — H&P
"[facility]  Randy Meng  : 2000  MRN: [id number]  CSN: [id number]    History and Physical    Subjective   Randy Meng is a 23 y.o.  who presented for evaluation ,sent from office.  For admission and possible delivery. Pt developed preeclampsia and she is on procardia xl 30 mgs bid  She received steroids  23 and 23,  reported a 2/10 headache that got better with tylenol  .    History reviewed. No pertinent past medical history.  History reviewed. No pertinent surgical history.  Social History    Tobacco Use      Smoking status: Never      Smokeless tobacco: Never      Current Facility-Administered Medications:     lactated ringers infusion, 75 mL/hr, Intravenous, Continuous, Chon Granados MD, Last Rate: 75 mL/hr at 24 0250, 75 mL/hr at 24 0250    NIFEdipine XL (PROCARDIA XL) 24 hr tablet 30 mg, 30 mg, Oral, Q12H, Chon Granados MD, 30 mg at 24 1026    sodium chloride 0.9 % flush 10 mL, 10 mL, Intravenous, Q12H, Chon Granados MD    sodium chloride 0.9 % flush 10 mL, 10 mL, Intravenous, PRN, Chon Granados MD    No Known Allergies    Review of Systems      Objective   /92   Pulse 91   Temp 98.4 °F (36.9 °C) (Oral)   Resp 16   Ht 149.9 cm (59\")   Wt 60.8 kg (134 lb)   BMI 27.06 kg/m²   General: well developed; well nourished  no acute distress   Heart: regular rate and rhythm, S1, S2 normal, no murmur, click, rub or gallop   Lungs: breathing is unlabored   Abdomen: soft, non-tender; no masses  no umbilical or inguinal hernias are present  no hepato-splenomegaly   Pelvis:: Clinical staff was present for exam  Reported finger tip    Labs  Lab Results   Component Value Date     2024    HGB 13.9 2024    HCT 41.3 2024    WBC 16.03 (H) 2024     2024    K 4.2 2024     2024    CO2 21.0 (L) 2024    BUN 8 2024    CREATININE 0.50 (L) 2024    GLUCOSE 80 2024    ALBUMIN 3.8 " 01/16/2024    CALCIUM 9.8 01/16/2024    AST 37 (H) 01/16/2024    ALT 37 (H) 01/16/2024    BILITOT 0.2 01/16/2024        Assessment   Preeclampsia, 31w2d    The risks, benefits, and alternatives of the procedure; along with the risks of anesthesia was discussed in full with the patient and family and all questions were answered.       Plan   Observe overnight, repeat labs, finish 24 hour urine collection, repeat steroids    Chon Granados MD  1/17/2024  12:53 CST

## 2024-01-17 NOTE — NURSING NOTE
Patient D/C home after 24 hour urine was collected per MD. Once results came back patient was called to come in and spend the night to be closely monitored until her appointment with Dr. Nam in the am.     Joan Mejias RN

## 2024-01-17 NOTE — H&P
Subjective:       Hien Chery is a 23 y.o.  at 31 1/7 wks with history of gestational HTN, IUGR with elevated dopplers who presents from [facility] appointment with marked elevation of BP.  Patient has been compliant with Procardia 30 mg BID.   Last dose prior to arrival was 1000.  Patient denies RUQ pain, scotomata or headache.  She notes good fetal movement    Review of Systems   Constitutional: Negative.    HENT: Negative.     Eyes: Negative.    Respiratory: Negative.     Cardiovascular: Negative.    Gastrointestinal: Negative.    Endocrine: Negative.    Genitourinary:  Positive for vaginal discharge (associated with irritation).   Musculoskeletal: Negative.    Skin: Negative.    Allergic/Immunologic: Negative.    Neurological: Negative.    Hematological: Negative.    Psychiatric/Behavioral: Negative.           Past Medical History:   Diagnosis Date    Genital herpes        Past Surgical History:   Procedure Laterality Date    EXTERNAL EAR SURGERY Left        Family History   Problem Relation Age of Onset    Other Mother         Irregular menses    Diabetes Maternal Grandmother     Cancer Maternal Grandfather        Social History     Tobacco Use    Smoking status: Never    Smokeless tobacco: Never   Vaping Use    Vaping Use: Former   Substance Use Topics    Alcohol use: No     Alcohol/week: 0.0 standard drinks of alcohol    Drug use: No         Current Facility-Administered Medications:     NIFEdipine (PROCARDIA XL) extended release tablet 60 mg, 60 mg, Oral, BID, Renata Goodman MD    labetalol (NORMODYNE) tablet 200 mg, 200 mg, Oral, BID, Renata Goodman MD    metroNIDAZOLE (FLAGYL) tablet 500 mg, 500 mg, Oral, BID, Renata Goodman MD, 500 mg at 24 1726    No Known Allergies       Objective:       BP (!) 141/93   Pulse 92   Temp 97.8 °F (36.6 °C)   Resp 16   Ht 1.499 m (4' 11\")   Wt 59.4 kg (131 lb)   LMP 2023   SpO2 100%   BMI 26.46 kg/m²   Physical

## 2024-01-17 NOTE — PLAN OF CARE
Goal Outcome Evaluation:  Plan of Care Reviewed With: patient           Outcome Evaluation: , 39w1d. Transferred from [facility] for pre-eclampsia. No headache, vision changes, or epigastric pain reported by patient at this time. BP currently stable. Continuous fetal monitoring overnight. Clear liquid diet per Dr. Granados. Patient able to rest and sleep intermittently overnight.

## 2024-01-18 LAB
ALBUMIN SERPL-MCNC: 3.2 G/DL (ref 3.5–5.2)
ALBUMIN SERPL-MCNC: 3.5 G/DL (ref 3.5–5.2)
ALBUMIN/GLOB SERPL: 1.3 G/DL
ALBUMIN/GLOB SERPL: 1.3 G/DL
ALP SERPL-CCNC: 90 U/L (ref 39–117)
ALP SERPL-CCNC: 90 U/L (ref 39–117)
ALT SERPL W P-5'-P-CCNC: 26 U/L (ref 1–33)
ALT SERPL W P-5'-P-CCNC: 28 U/L (ref 1–33)
ANION GAP SERPL CALCULATED.3IONS-SCNC: 11 MMOL/L (ref 5–15)
ANION GAP SERPL CALCULATED.3IONS-SCNC: 12 MMOL/L (ref 5–15)
AST SERPL-CCNC: 29 U/L (ref 1–32)
AST SERPL-CCNC: 36 U/L (ref 1–32)
BASOPHILS # BLD AUTO: 0.03 10*3/MM3 (ref 0–0.2)
BASOPHILS NFR BLD AUTO: 0.1 % (ref 0–1.5)
BILIRUB SERPL-MCNC: <0.2 MG/DL (ref 0–1.2)
BILIRUB SERPL-MCNC: <0.2 MG/DL (ref 0–1.2)
BUN SERPL-MCNC: 10 MG/DL (ref 6–20)
BUN SERPL-MCNC: 8 MG/DL (ref 6–20)
BUN/CREAT SERPL: 10.5 (ref 7–25)
BUN/CREAT SERPL: 17.5 (ref 7–25)
CALCIUM SPEC-SCNC: 8 MG/DL (ref 8.6–10.5)
CALCIUM SPEC-SCNC: 8.4 MG/DL (ref 8.6–10.5)
CHLORIDE SERPL-SCNC: 101 MMOL/L (ref 98–107)
CHLORIDE SERPL-SCNC: 98 MMOL/L (ref 98–107)
CO2 SERPL-SCNC: 23 MMOL/L (ref 22–29)
CO2 SERPL-SCNC: 26 MMOL/L (ref 22–29)
CREAT SERPL-MCNC: 0.57 MG/DL (ref 0.57–1)
CREAT SERPL-MCNC: 0.76 MG/DL (ref 0.57–1)
DEPRECATED RDW RBC AUTO: 41.3 FL (ref 37–54)
DEPRECATED RDW RBC AUTO: 43.5 FL (ref 37–54)
EGFRCR SERPLBLD CKD-EPI 2021: 113.8 ML/MIN/1.73
EGFRCR SERPLBLD CKD-EPI 2021: 132 ML/MIN/1.73
EOSINOPHIL # BLD AUTO: 0.01 10*3/MM3 (ref 0–0.4)
EOSINOPHIL NFR BLD AUTO: 0 % (ref 0.3–6.2)
ERYTHROCYTE [DISTWIDTH] IN BLOOD BY AUTOMATED COUNT: 13.3 % (ref 12.3–15.4)
ERYTHROCYTE [DISTWIDTH] IN BLOOD BY AUTOMATED COUNT: 13.6 % (ref 12.3–15.4)
GLOBULIN UR ELPH-MCNC: 2.4 GM/DL
GLOBULIN UR ELPH-MCNC: 2.6 GM/DL
GLUCOSE SERPL-MCNC: 106 MG/DL (ref 65–99)
GLUCOSE SERPL-MCNC: 93 MG/DL (ref 65–99)
HCT VFR BLD AUTO: 33.3 % (ref 34–46.6)
HCT VFR BLD AUTO: 33.9 % (ref 34–46.6)
HGB BLD-MCNC: 11.3 G/DL (ref 12–15.9)
HGB BLD-MCNC: 11.5 G/DL (ref 12–15.9)
IMM GRANULOCYTES # BLD AUTO: 0.17 10*3/MM3 (ref 0–0.05)
IMM GRANULOCYTES NFR BLD AUTO: 0.7 % (ref 0–0.5)
LYMPHOCYTES # BLD AUTO: 1.58 10*3/MM3 (ref 0.7–3.1)
LYMPHOCYTES # BLD MANUAL: 1.65 10*3/MM3 (ref 0.7–3.1)
LYMPHOCYTES NFR BLD AUTO: 6.8 % (ref 19.6–45.3)
LYMPHOCYTES NFR BLD MANUAL: 8.1 % (ref 5–12)
MCH RBC QN AUTO: 29 PG (ref 26.6–33)
MCH RBC QN AUTO: 29.6 PG (ref 26.6–33)
MCHC RBC AUTO-ENTMCNC: 33.9 G/DL (ref 31.5–35.7)
MCHC RBC AUTO-ENTMCNC: 33.9 G/DL (ref 31.5–35.7)
MCV RBC AUTO: 85.6 FL (ref 79–97)
MCV RBC AUTO: 87.1 FL (ref 79–97)
MONOCYTES # BLD AUTO: 1.24 10*3/MM3 (ref 0.1–0.9)
MONOCYTES # BLD: 2.19 10*3/MM3 (ref 0.1–0.9)
MONOCYTES NFR BLD AUTO: 5.3 % (ref 5–12)
NEUTROPHILS # BLD AUTO: 22.94 10*3/MM3 (ref 1.7–7)
NEUTROPHILS NFR BLD AUTO: 20.18 10*3/MM3 (ref 1.7–7)
NEUTROPHILS NFR BLD AUTO: 87.1 % (ref 42.7–76)
NEUTROPHILS NFR BLD MANUAL: 82.8 % (ref 42.7–76)
NEUTS BAND NFR BLD MANUAL: 2 % (ref 0–5)
NRBC BLD AUTO-RTO: 0 /100 WBC (ref 0–0.2)
PLASMA CELL PREC NFR BLD MANUAL: 1 % (ref 0–0)
PLAT MORPH BLD: NORMAL
PLATELET # BLD AUTO: 160 10*3/MM3 (ref 140–450)
PLATELET # BLD AUTO: 165 10*3/MM3 (ref 140–450)
PMV BLD AUTO: 10.9 FL (ref 6–12)
PMV BLD AUTO: 11.3 FL (ref 6–12)
POTASSIUM SERPL-SCNC: 4.3 MMOL/L (ref 3.5–5.2)
POTASSIUM SERPL-SCNC: 4.5 MMOL/L (ref 3.5–5.2)
PROT SERPL-MCNC: 5.6 G/DL (ref 6–8.5)
PROT SERPL-MCNC: 6.1 G/DL (ref 6–8.5)
RBC # BLD AUTO: 3.89 10*6/MM3 (ref 3.77–5.28)
RBC # BLD AUTO: 3.89 10*6/MM3 (ref 3.77–5.28)
SODIUM SERPL-SCNC: 135 MMOL/L (ref 136–145)
SODIUM SERPL-SCNC: 136 MMOL/L (ref 136–145)
SPHEROCYTES BLD QL SMEAR: ABNORMAL
T PALLIDUM IGG SER QL: NORMAL
VARIANT LYMPHS NFR BLD MANUAL: 1 % (ref 0–5)
VARIANT LYMPHS NFR BLD MANUAL: 5.1 % (ref 19.6–45.3)
WBC MORPH BLD: NORMAL
WBC NRBC COR # BLD AUTO: 23.21 10*3/MM3 (ref 3.4–10.8)
WBC NRBC COR # BLD AUTO: 27.04 10*3/MM3 (ref 3.4–10.8)

## 2024-01-18 PROCEDURE — 25810000003 LACTATED RINGERS PER 1000 ML: Performed by: OBSTETRICS & GYNECOLOGY

## 2024-01-18 PROCEDURE — 80053 COMPREHEN METABOLIC PANEL: CPT | Performed by: OBSTETRICS & GYNECOLOGY

## 2024-01-18 PROCEDURE — 85007 BL SMEAR W/DIFF WBC COUNT: CPT | Performed by: OBSTETRICS & GYNECOLOGY

## 2024-01-18 PROCEDURE — 25010000002 MAGNESIUM SULFATE 20 GM/500ML SOLUTION: Performed by: OBSTETRICS & GYNECOLOGY

## 2024-01-18 PROCEDURE — 51702 INSERT TEMP BLADDER CATH: CPT

## 2024-01-18 PROCEDURE — 25010000002 FENTANYL CITRATE (PF) 100 MCG/2ML SOLUTION: Performed by: NURSE ANESTHETIST, CERTIFIED REGISTERED

## 2024-01-18 PROCEDURE — 25010000002 HYDROMORPHONE 1 MG/ML SOLUTION: Performed by: NURSE ANESTHETIST, CERTIFIED REGISTERED

## 2024-01-18 PROCEDURE — 25010000002 OXYTOCIN PER 10 UNITS: Performed by: NURSE ANESTHETIST, CERTIFIED REGISTERED

## 2024-01-18 PROCEDURE — 85025 COMPLETE CBC W/AUTO DIFF WBC: CPT | Performed by: OBSTETRICS & GYNECOLOGY

## 2024-01-18 PROCEDURE — 25010000002 FUROSEMIDE PER 20 MG: Performed by: OBSTETRICS & GYNECOLOGY

## 2024-01-18 PROCEDURE — 88307 TISSUE EXAM BY PATHOLOGIST: CPT | Performed by: OBSTETRICS & GYNECOLOGY

## 2024-01-18 PROCEDURE — 25010000002 KETOROLAC TROMETHAMINE PER 15 MG: Performed by: NURSE ANESTHETIST, CERTIFIED REGISTERED

## 2024-01-18 PROCEDURE — 85027 COMPLETE CBC AUTOMATED: CPT | Performed by: OBSTETRICS & GYNECOLOGY

## 2024-01-18 PROCEDURE — 25010000002 KETOROLAC TROMETHAMINE PER 15 MG: Performed by: OBSTETRICS & GYNECOLOGY

## 2024-01-18 PROCEDURE — 25010000002 LABETALOL 5 MG/ML SOLUTION: Performed by: OBSTETRICS & GYNECOLOGY

## 2024-01-18 RX ORDER — ACETAMINOPHEN 325 MG/1
650 TABLET ORAL EVERY 6 HOURS
Status: DISCONTINUED | OUTPATIENT
Start: 2024-01-19 | End: 2024-01-20 | Stop reason: HOSPADM

## 2024-01-18 RX ORDER — MAGNESIUM SULFATE HEPTAHYDRATE 40 MG/ML
4 INJECTION, SOLUTION INTRAVENOUS ONCE
Status: COMPLETED | OUTPATIENT
Start: 2024-01-18 | End: 2024-01-18

## 2024-01-18 RX ORDER — OXYCODONE HYDROCHLORIDE 5 MG/1
10 TABLET ORAL EVERY 4 HOURS PRN
Status: DISCONTINUED | OUTPATIENT
Start: 2024-01-18 | End: 2024-01-20 | Stop reason: HOSPADM

## 2024-01-18 RX ORDER — PROMETHAZINE HYDROCHLORIDE 25 MG/1
25 TABLET ORAL EVERY 6 HOURS PRN
Status: DISCONTINUED | OUTPATIENT
Start: 2024-01-18 | End: 2024-01-20 | Stop reason: HOSPADM

## 2024-01-18 RX ORDER — CARBOPROST TROMETHAMINE 250 UG/ML
250 INJECTION, SOLUTION INTRAMUSCULAR AS NEEDED
Status: DISCONTINUED | OUTPATIENT
Start: 2024-01-18 | End: 2024-01-18 | Stop reason: HOSPADM

## 2024-01-18 RX ORDER — ACETAMINOPHEN 500 MG
1000 TABLET ORAL EVERY 6 HOURS
Status: COMPLETED | OUTPATIENT
Start: 2024-01-18 | End: 2024-01-19

## 2024-01-18 RX ORDER — ONDANSETRON 2 MG/ML
4 INJECTION INTRAMUSCULAR; INTRAVENOUS EVERY 6 HOURS PRN
Status: DISCONTINUED | OUTPATIENT
Start: 2024-01-18 | End: 2024-01-18 | Stop reason: HOSPADM

## 2024-01-18 RX ORDER — DROPERIDOL 2.5 MG/ML
0.62 INJECTION, SOLUTION INTRAMUSCULAR; INTRAVENOUS ONCE AS NEEDED
Status: DISCONTINUED | OUTPATIENT
Start: 2024-01-18 | End: 2024-01-20 | Stop reason: HOSPADM

## 2024-01-18 RX ORDER — OXYTOCIN 10 [USP'U]/ML
INJECTION, SOLUTION INTRAMUSCULAR; INTRAVENOUS AS NEEDED
Status: DISCONTINUED | OUTPATIENT
Start: 2024-01-18 | End: 2024-01-18 | Stop reason: SURG

## 2024-01-18 RX ORDER — LIDOCAINE 50 MG/G
1 PATCH TOPICAL
Status: DISCONTINUED | OUTPATIENT
Start: 2024-01-18 | End: 2024-01-20 | Stop reason: HOSPADM

## 2024-01-18 RX ORDER — PROMETHAZINE HYDROCHLORIDE 25 MG/1
12.5 TABLET ORAL EVERY 6 HOURS PRN
Status: DISCONTINUED | OUTPATIENT
Start: 2024-01-18 | End: 2024-01-18 | Stop reason: HOSPADM

## 2024-01-18 RX ORDER — PROMETHAZINE HYDROCHLORIDE 12.5 MG/1
12.5 SUPPOSITORY RECTAL EVERY 6 HOURS PRN
Status: DISCONTINUED | OUTPATIENT
Start: 2024-01-18 | End: 2024-01-18 | Stop reason: HOSPADM

## 2024-01-18 RX ORDER — FUROSEMIDE 10 MG/ML
20 INJECTION INTRAMUSCULAR; INTRAVENOUS ONCE
Status: COMPLETED | OUTPATIENT
Start: 2024-01-18 | End: 2024-01-18

## 2024-01-18 RX ORDER — IBUPROFEN 600 MG/1
600 TABLET ORAL EVERY 6 HOURS
Status: DISCONTINUED | OUTPATIENT
Start: 2024-01-19 | End: 2024-01-20 | Stop reason: HOSPADM

## 2024-01-18 RX ORDER — METHYLERGONOVINE MALEATE 0.2 MG/ML
200 INJECTION INTRAVENOUS AS NEEDED
Status: DISCONTINUED | OUTPATIENT
Start: 2024-01-18 | End: 2024-01-18 | Stop reason: HOSPADM

## 2024-01-18 RX ORDER — PROMETHAZINE HYDROCHLORIDE 12.5 MG/1
12.5 SUPPOSITORY RECTAL EVERY 6 HOURS PRN
Status: DISCONTINUED | OUTPATIENT
Start: 2024-01-18 | End: 2024-01-20 | Stop reason: HOSPADM

## 2024-01-18 RX ORDER — OXYTOCIN/0.9 % SODIUM CHLORIDE 30/500 ML
125 PLASTIC BAG, INJECTION (ML) INTRAVENOUS CONTINUOUS PRN
Status: DISCONTINUED | OUTPATIENT
Start: 2024-01-18 | End: 2024-01-20 | Stop reason: HOSPADM

## 2024-01-18 RX ORDER — KETOROLAC TROMETHAMINE 30 MG/ML
30 INJECTION, SOLUTION INTRAMUSCULAR; INTRAVENOUS EVERY 6 HOURS
Qty: 4 ML | Refills: 0 | Status: COMPLETED | OUTPATIENT
Start: 2024-01-18 | End: 2024-01-19

## 2024-01-18 RX ORDER — NALOXONE HCL 0.4 MG/ML
0.4 VIAL (ML) INJECTION
Status: ACTIVE | OUTPATIENT
Start: 2024-01-18 | End: 2024-01-19

## 2024-01-18 RX ORDER — NIFEDIPINE 30 MG/1
30 TABLET, EXTENDED RELEASE ORAL 2 TIMES DAILY
Status: DISCONTINUED | OUTPATIENT
Start: 2024-01-18 | End: 2024-01-20 | Stop reason: HOSPADM

## 2024-01-18 RX ORDER — ONDANSETRON 2 MG/ML
4 INJECTION INTRAMUSCULAR; INTRAVENOUS EVERY 6 HOURS PRN
Status: DISCONTINUED | OUTPATIENT
Start: 2024-01-18 | End: 2024-01-20 | Stop reason: HOSPADM

## 2024-01-18 RX ORDER — KETOROLAC TROMETHAMINE 30 MG/ML
INJECTION, SOLUTION INTRAMUSCULAR; INTRAVENOUS AS NEEDED
Status: DISCONTINUED | OUTPATIENT
Start: 2024-01-18 | End: 2024-01-18 | Stop reason: SURG

## 2024-01-18 RX ORDER — OXYCODONE HYDROCHLORIDE 5 MG/1
5 TABLET ORAL EVERY 4 HOURS PRN
Status: DISCONTINUED | OUTPATIENT
Start: 2024-01-18 | End: 2024-01-20 | Stop reason: HOSPADM

## 2024-01-18 RX ORDER — LIDOCAINE 50 MG/G
PATCH TOPICAL AS NEEDED
Status: DISCONTINUED | OUTPATIENT
Start: 2024-01-18 | End: 2024-01-20 | Stop reason: HOSPADM

## 2024-01-18 RX ORDER — LABETALOL HYDROCHLORIDE 5 MG/ML
20 INJECTION, SOLUTION INTRAVENOUS ONCE
Status: COMPLETED | OUTPATIENT
Start: 2024-01-18 | End: 2024-01-18

## 2024-01-18 RX ORDER — ONDANSETRON 4 MG/1
4 TABLET, ORALLY DISINTEGRATING ORAL EVERY 6 HOURS PRN
Status: DISCONTINUED | OUTPATIENT
Start: 2024-01-18 | End: 2024-01-18 | Stop reason: HOSPADM

## 2024-01-18 RX ORDER — MAGNESIUM SULFATE HEPTAHYDRATE 40 MG/ML
2 INJECTION, SOLUTION INTRAVENOUS CONTINUOUS
Status: DISCONTINUED | OUTPATIENT
Start: 2024-01-18 | End: 2024-01-18

## 2024-01-18 RX ORDER — NALBUPHINE HYDROCHLORIDE 10 MG/ML
2.5 INJECTION, SOLUTION INTRAMUSCULAR; INTRAVENOUS; SUBCUTANEOUS EVERY 4 HOURS PRN
Status: ACTIVE | OUTPATIENT
Start: 2024-01-18 | End: 2024-01-19

## 2024-01-18 RX ORDER — MISOPROSTOL 200 UG/1
800 TABLET ORAL AS NEEDED
Status: DISCONTINUED | OUTPATIENT
Start: 2024-01-18 | End: 2024-01-18 | Stop reason: HOSPADM

## 2024-01-18 RX ADMIN — FENTANYL CITRATE 85 MCG: 50 INJECTION, SOLUTION INTRAMUSCULAR; INTRAVENOUS at 00:16

## 2024-01-18 RX ADMIN — ACETAMINOPHEN TAB 500 MG 1000 MG: 500 TAB at 17:17

## 2024-01-18 RX ADMIN — KETOROLAC TROMETHAMINE 30 MG: 30 INJECTION, SOLUTION INTRAMUSCULAR; INTRAVENOUS at 15:18

## 2024-01-18 RX ADMIN — OXYTOCIN 30 UNITS: 10 INJECTION, SOLUTION INTRAMUSCULAR; INTRAVENOUS at 00:03

## 2024-01-18 RX ADMIN — NIFEDIPINE 30 MG: 30 TABLET, FILM COATED, EXTENDED RELEASE ORAL at 08:28

## 2024-01-18 RX ADMIN — KETOROLAC TROMETHAMINE 30 MG: 30 INJECTION, SOLUTION INTRAMUSCULAR; INTRAVENOUS at 00:09

## 2024-01-18 RX ADMIN — SODIUM CHLORIDE, POTASSIUM CHLORIDE, SODIUM LACTATE AND CALCIUM CHLORIDE 75 ML/HR: 600; 310; 30; 20 INJECTION, SOLUTION INTRAVENOUS at 03:33

## 2024-01-18 RX ADMIN — KETOROLAC TROMETHAMINE 30 MG: 30 INJECTION, SOLUTION INTRAMUSCULAR; INTRAVENOUS at 08:28

## 2024-01-18 RX ADMIN — LIDOCAINE PATCH 5% 1 PATCH: 700 PATCH TOPICAL at 00:27

## 2024-01-18 RX ADMIN — HYDROMORPHONE HYDROCHLORIDE 0.9 MG: 1 INJECTION, SOLUTION INTRAMUSCULAR; INTRAVENOUS; SUBCUTANEOUS at 00:18

## 2024-01-18 RX ADMIN — LABETALOL HYDROCHLORIDE 20 MG: 5 INJECTION, SOLUTION INTRAVENOUS at 03:06

## 2024-01-18 RX ADMIN — FUROSEMIDE 20 MG: 10 INJECTION, SOLUTION INTRAMUSCULAR; INTRAVENOUS at 03:10

## 2024-01-18 RX ADMIN — KETOROLAC TROMETHAMINE 30 MG: 30 INJECTION, SOLUTION INTRAMUSCULAR; INTRAVENOUS at 21:41

## 2024-01-18 RX ADMIN — MAGNESIUM SULFATE HEPTAHYDRATE 4 G: 40 INJECTION, SOLUTION INTRAVENOUS at 03:14

## 2024-01-18 RX ADMIN — ACETAMINOPHEN TAB 500 MG 1000 MG: 500 TAB at 05:27

## 2024-01-18 RX ADMIN — NIFEDIPINE 30 MG: 30 TABLET, FILM COATED, EXTENDED RELEASE ORAL at 21:47

## 2024-01-18 RX ADMIN — ACETAMINOPHEN TAB 500 MG 1000 MG: 500 TAB at 11:35

## 2024-01-18 NOTE — ANESTHESIA POSTPROCEDURE EVALUATION
Patient: Randy Meng    Procedure Summary       Date: 24 Room / Location: [facility] LABOR DELIVERY 2 / [facility] LABOR DELIVERY    Anesthesia Start:  Anesthesia Stop: 24    Procedure:  SECTION PRIMARY (Abdomen) Diagnosis:     Surgeons: Chon Granados MD Provider: Raghu Mathis CRNA    Anesthesia Type: spinal ASA Status: 2 - Emergent            Anesthesia Type: spinal    Vitals  Vitals Value Taken Time   /98 24 0901   Temp 99 °F (37.2 °C) 24 0430   Pulse 90 24 0930   Resp 16 24 0830   SpO2 100 % 24 0930   Vitals shown include unfiled device data.        Post Anesthesia Care and Evaluation    Patient location during evaluation: PACU  Patient participation: complete - patient participated  Level of consciousness: awake and alert  Pain score: 0  Pain management: adequate    Airway patency: patent  Anesthetic complications: No anesthetic complications    Cardiovascular status: acceptable and stable  Respiratory status: acceptable and unassisted  Hydration status: acceptable  Post Neuraxial Block status: Motor and sensory function returned to baseline and No signs or symptoms of PDPH

## 2024-01-18 NOTE — LACTATION NOTE
This note was copied from a baby's chart.  Name: Nima Smith  Day: 0  Dx: prematurity, low birth weight  Birth Gestation: 31w3d  Adjusted Gestation:   Birth weight: 2-11 (1220g)  Last weight:              % of weight loss:    Feeding Orders: NPO  Maternal Hx: , Preeclampsia, Non-Reassuring Fetal Status during induction resulting in c/section.  Prenatal Medications: PNV, Procardia XL  Pump available: Rx faxed to [facility]  Pumping history in the last 24 hours: Pump provided with instruction 8 hours after delivery    Reviewed NICU Breastfeeding handouts as below. Mother states she has already discussed DBM with Neonatology. Recommended power pumping due to delay in initiation of pumping. Instructed on cleaning and steaming of pump parts. SO @ bsd sleeping will assist with pumping/cleaning. Offered assistance and support as needed.        **Breast pump Kit Care Cleaning-Drying-Storing handout by [employer]   **Collecting,Labeling,Storing and Transporting Breast milk for Babies in the NICU handout  **Hand Expression handout provided by [employer]   **Breast Engorgement Handout provided by [employer]   **Check list for Essentials of Positioning and Latch-on  Handout provided by [employer]  **The Many Benefits of Breastfeeding handout  **Hands on Pumping Handout provided by [employer]  **Increasing breast milk supply for a Baby in the NICU handout provided by [employer]  **My Breast pumping Log Handout  **When Will my Milk Come In/ handout provided  By the first latch  **Human Milk Storage for  Infants handout  **Care Plan for Breastfeeding your  Baby handout  **Using a Nipple Shield handout provided by [employer]  **Providing Milk for Your NICU Baby Handout  **Quick Clean Micro Steam bags by [employer]  ** Freshly Expressed Breastmilk Storage Guidelines for Healthy Term Babies Magnet  by [facility]    Providing Milk for your NICU Baby  The following is a list of what to expect after the birth of your baby and how a mother's milk can make all the difference.    THE BENEFITS OF MOTHER'S OWN MILK FOR A NICU BABY  * In addition to all the benefits breast milk provides all babies (see list on other handout), NICU babies receive extra benefits.  * Your milk is easier on baby's tummy which may be less ready to digest food.  *  milk has more beneficial fat for growth, protein, and essential minerals.  * Mother's milk allows for better eye development and function  *Babies born early must finish their body tissue development after birth. The special protein in mother's milk allows for optimal body development  *Mother's milk is a natural antibiotic that protects baby from infections; babies fed breast milk are much less likely to develop gastrointestinal illnesses, including the dangerous infection necrotizing enterocolitis (NEC)  *Digesting breast milk is much easier for the baby. He is them able to use the ken calories he takes in for growth and development. It provides a fast fuel that is easily absorbed for use in critical body functions.    By providing human milk for your baby, you are providing a powerful medication that no hospital can make!!    HOW TO BEGIN:  *If you feel up to it, our lactation staff will help you begin pumping your milk as soon as possible after delivery. Our goal is to begin within 4 hours of baby's birth. If this is not possible, we must make every effort to institute pumping within 12 hours of delivery.    *Do not be discouraged by small amounts! This is super-concentrated nutrition and all baby needs is in those magical drops. In your folder there is a guide regarding target amounts and a pumping log to record your efforts at providing milk for your baby.

## 2024-01-18 NOTE — OP NOTE
Section Procedure Note    Indications: non-reassuring fetal status and chronic htn  with superimposed preeclampsia    Pre-operative Diagnosis: 31 week 2 day pregnancy.    Post-operative Diagnosis: same    Surgeon: Chon Granados MD     Assistants: n/a    Anesthesia: Spinal anesthesia    ASA Class: 1    Procedure Details   The patient was seen in the Holding Room. The risks, benefits, complications, treatment options, and expected outcomes were discussed with the patient.  The patient concurred with the proposed plan, giving informed consent.  The site of surgery properly noted/marked. The patient was taken to Operating Room # 2, identified as Randy Meng and the procedure verified as  Delivery. A Time Out was held and the above information confirmed.    After induction of anesthesia, the patient was draped and prepped in the usual sterile manner. A Pfannenstiel incision was made and carried down through the subcutaneous tissue to the fascia. Fascial incision was made and extended transversely. The fascia was  from the underlying rectus tissue superiorly and inferiorly. The peritoneum was identified and entered. Peritoneal incision was extended longitudinally. The utero-vesical peritoneal reflection was incised transversely and the bladder flap was bluntly freed from the lower uterine segment. A low transverse uterine incision was made. Delivered from vertex presentation was a female  1220 grams  with Apgar scores of unknown at one minute and unknown at five minutes. After the umbilical cord was clamped and cut cord blood was obtained for evaluation. The placenta was removed intact and appeared normal. The uterine outline, tubes and ovaries appeared normal. The uterine incision was closed with running locked sutures of 0 Vicryl. Hemostasis was observed. Lavage was carried out until clear. The fascia was then reapproximated with running sutures of 0 Vicryl. The skin was reapproximated  with staples.    Instrument, sponge, and needle counts were correct prior the abdominal closure and at the conclusion of the case.     Findings:  Female infant, apgar undetermined , placenta intact 3 vessels, normal tubes and ovaries     Estimated Blood Loss:   600 mls           Drains: vaughn            Total IV Fluids: n/aml           Specimens: placenta            Implants: none           Complications:  None; patient tolerated the procedure well.           Disposition: PACU - hemodynamically stable.           Condition: stable    Attending Attestation: I performed the procedure.

## 2024-01-18 NOTE — ANESTHESIA POSTPROCEDURE EVALUATION
Patient: Randy Meng    Procedure Summary       Date: 24 Room / Location: [facility] LABOR DELIVERY 2 / [facility] LABOR DELIVERY    Anesthesia Start:  Anesthesia Stop: 24    Procedure:  SECTION PRIMARY (Abdomen) Diagnosis:     Surgeons: Chon Granados MD Provider: Raghu Mathis CRNA    Anesthesia Type: spinal ASA Status: 2 - Emergent            Anesthesia Type: No value filed.    Vitals  Vitals Value Taken Time   /98 24 0901   Temp 99 °F (37.2 °C) 24 0430   Pulse 90 24 0925   Resp 16 24 0830   SpO2 100 % 24   Vitals shown include unfiled device data.        Post Anesthesia Care and Evaluation    Patient location during evaluation: PACU  Patient participation: complete - patient participated  Level of consciousness: awake and alert  Pain score: 0  Pain management: adequate    Airway patency: patent  Anesthetic complications: No anesthetic complications    Cardiovascular status: acceptable and stable  Respiratory status: acceptable and unassisted  Hydration status: acceptable  Post Neuraxial Block status: Motor and sensory function returned to baseline and No signs or symptoms of PDPH

## 2024-01-18 NOTE — PROGRESS NOTES
Pt reports feeling well,  Vitals noted  Labs noted  Exam unremarkable  Plan advance care, d/c mag, move to floor on procardia

## 2024-01-18 NOTE — PLAN OF CARE
Goal Outcome Evaluation:  Plan of Care Reviewed With: patient        Progress: improving  Outcome Evaluation:  31.3 week gestation. Primary C/S on 2024 @0001. Magnesium sulfate infusion 4gm load and then 2 gm/h started at 0314. Labetalol 20 mg IV given for elevated bp and Lasix 20 mg IV given for decreased urine output. Uterus firm/midline/uu scant rubra. Abdominal binder in place. SCDs applied. Current bps stable. Urine output increased x 2 hours. Labs pending. Baby girl in NICU.

## 2024-01-18 NOTE — CASE MANAGEMENT/SOCIAL WORK
Order: 10 on EPDS.  Order also to provide info on NICU resources for infant.  Pt lives with her mother, very supportive and present in the room.  Pt feels depression due to pregnancy and never been this way before nor on any medication for depression.  She was informed to contact physician if condition does not improve or worsens.  Informed of resources if needed while infant in NICU.  She is getting ready to transfer to regular floor and happy she will be able to visit infant.  She has support and needed baby supplies.  Please inform if any questions/concerns arise.

## 2024-01-18 NOTE — ANESTHESIA PROCEDURE NOTES
Spinal Block      Patient location during procedure: OB  Indication:at surgeon's request  Performed By  CRNA/KELLEY: Raghu Mathis CRNA  Preanesthetic Checklist  Completed: patient identified, IV checked, site marked, risks and benefits discussed, surgical consent, monitors and equipment checked, pre-op evaluation and timeout performed  Spinal Block Prep:  Patient Position:sitting  Sterile Tech:cap, gloves, mask and sterile barriers  Prep:Chloraprep  Patient Monitoring:blood pressure monitoring, continuous pulse oximetry and EKG    Spinal Block Procedure  Approach:midline  Guidance:landmark technique  Location:L4-L5  Needle Type:Pencan  Needle Gauge:25 G  Placement of Spinal needle event:cerebrospinal fluid aspirated  Paresthesia: no  Fluid Appearance:clear     Post Assessment  Patient Tolerance:patient tolerated the procedure well with no apparent complications  Complications no

## 2024-01-18 NOTE — H&P
[facility]   Randy Meng  : 2000  MRN: [id number]  CSN: [id number]    History and Physical    Subjective   Randy Meng is a 23 y.o.  who is 31w2d,  known htn/preeclampsia, readmitted for  monitoring until delivery. Pt denied any cns symptoms. Reports good mov,  no bleeding or leaking membranes.   Suspected iugr, , just completed urine showing 1600 mgs of protein, other labs  are stable    History reviewed. No pertinent past medical history.  History reviewed. No pertinent surgical history.  Social History    Tobacco Use      Smoking status: Never      Smokeless tobacco: Never      Current Facility-Administered Medications:     acetaminophen (TYLENOL) tablet 1,000 mg, 1,000 mg, Oral, Once, Raghu Mathis CRNA    famotidine (PEPCID) injection 20 mg, 20 mg, Intravenous, Once, Raghu Mathis CRNA    lactated ringers bolus 1,000 mL, 1,000 mL, Intravenous, Once, Raghu Mathis CRNA    lactated ringers infusion, 125 mL/hr, Intravenous, Continuous, Chon Granados MD, Last Rate: 125 mL/hr at 24 2344, Currently Infusing at 24 2344    lactated ringers infusion, 125 mL/hr, Intravenous, Continuous, Chon Granados MD    lidocaine (LIDODERM) 5 % 1 patch, 1 patch, Transdermal, Q24H, Chon Granados MD, 1 patch at 24 0027    metoclopramide (REGLAN) injection 10 mg, 10 mg, Intravenous, Once, Raghu Mathis CRNA    NIFEdipine XL (PROCARDIA XL) 24 hr tablet 30 mg, 30 mg, Oral, BID, Chon Granados MD, 30 mg at 24 1938    ondansetron (ZOFRAN) injection 4 mg, 4 mg, Intravenous, Once, Raghu Mathis CRNA    Sod Citrate-Citric Acid (BICITRA) oral solution 30 mL, 30 mL, Oral, Once, Raghu Mathis CRNA    sodium chloride 0.9 % flush 10 mL, 10 mL, Intravenous, Q12H, Chon Granados MD    sodium chloride 0.9 % flush 10 mL, 10 mL, Intravenous, PRN, Chon Granados MD    sodium chloride 0.9 % flush 10 mL, 10 mL, Intravenous, Q12H, Raghu Mathis  "TOAN Christianson    sodium chloride 0.9 % flush 10 mL, 10 mL, Intravenous, PRN, Raghu Mathis CRNA    sodium chloride 0.9 % infusion 40 mL, 40 mL, Intravenous, Del BOO Brian Keith, CRNA    Facility-Administered Medications Ordered in Other Encounters:     bupivacaine PF (MARCAINE) 0.75 % injection, , Intrathecal, PRN, Raghu Mathis CRNA, 1.2 mg at 01/17/24 2353    fentaNYL citrate (PF) (SUBLIMAZE) injection, , Intrathecal, PRN, Raghu Mathis CRNA, 85 mcg at 01/18/24 0016    HYDROmorphone (DILAUDID) injection, , Intrathecal, PRN, Raghu Mathis CRNA, 0.9 mg at 01/18/24 0018    ketorolac (TORADOL) injection, , Intravenous, PRN, Raghu Mathis CRNA, 30 mg at 01/18/24 0009    ondansetron (ZOFRAN) injection, , Intravenous, PRN, Raghu Mathis CRNA, 8 mg at 01/17/24 2347    oxytocin (PITOCIN) injection, , Intravenous, PRN, Raghu Mathis CRNA, 30 Units at 01/18/24 0003    No Known Allergies    Review of Systems      Objective   /81   Pulse 82   Temp 98 °F (36.7 °C) (Oral)   Resp 16   Ht 149.9 cm (59\")   Wt 60.8 kg (134 lb)   Breastfeeding Yes   BMI 27.06 kg/m²   General: well developed; well nourished  no acute distress   Heart: regular rate and rhythm, S1, S2 normal, no murmur, click, rub or gallop   Lungs: breathing is unlabored   Abdomen: soft, non-tender; no masses  no umbilical or inguinal hernias are present  no hepato-splenomegaly   Pelvis:: Clinical staff was present for exam, ft  Efw 1200 grams    Labs  Lab Results   Component Value Date     01/17/2024    HGB 14.0 01/17/2024    HCT 41.6 01/17/2024    WBC 14.20 (H) 01/17/2024     01/17/2024    K 4.0 01/17/2024     01/17/2024    CO2 23.0 01/17/2024    BUN 9 01/17/2024    CREATININE 0.57 01/17/2024    GLUCOSE 120 (H) 01/17/2024    ALBUMIN 4.2 01/17/2024    CALCIUM 9.7 01/17/2024    AST 38 (H) 01/17/2024    ALT 39 (H) 01/17/2024    BILITOT 0.2 01/17/2024        Assessment   31w2d "   Preeclampsia  Possible iugr    The risks, benefits, and alternatives of the procedure; along with the risks of anesthesia was discussed in full with the patient and family and all questions were answered.       Plan   Observe, hospital care  until delivery    Chon Granados MD  1/18/2024  00:33 CST

## 2024-01-18 NOTE — PAYOR COMM NOTE
"ADMIT 1/17/2023    [facility]  [place],    [phone]  OR  FAX   [phone]    Randy Duggan (23 y.o. Female)       Date of Birth   2000    Social Security Number       Address   [address] [facility] [place] [address]    Home Phone   [phone]    MRN   [id number]       Episcopalian   Taoist    Marital Status   Single                            Admission Date   1/17/24    Admission Type   Elective    Admitting Provider   Chon Granados MD    Attending Provider   Chon Granados MD    Department, Room/Bed   [facility] LABOR DELIVERY, L03/1       Discharge Date       Discharge Disposition       Discharge Destination                                 Attending Provider: Chon Granados MD    Allergies: No Known Allergies    Isolation: None   Infection: None   Code Status: CPR    Ht: 149.9 cm (59\")   Wt: 60.8 kg (134 lb)    Admission Cmt: None   Principal Problem: Pregnant [Z34.90]                   Active Insurance as of 1/17/2024       Primary Coverage       Payor Plan Insurance Group Employer/Plan Group    [employer]HEM BLUE CROSS ANTHEM BLUE CROSS BLUE SHIELD PPO [id number]       Payor Plan Address Payor Plan Phone Number Payor Plan Fax Number Effective Dates    [address] [phone]  1/1/2022 - None Entered    [facility] [id number]         Subscriber Name Subscriber Birth Date Member ID       CANDY DUGGAN [birth year]19[birth year]1979 [id number]               Secondary Coverage       Payor Plan Insurance Group Employer/Plan Group    [facility] MEDICAID        Payor Plan Address Payor Plan Phone Number Payor Plan Fax Number Effective Dates    [address] [address] [phone]  1/16/2024 - None Entered    [facility] [id number]         Subscriber Name Subscriber Birth Date Member ID       RANDY DUGGAN 2000 [id number]                     Emergency Contacts        (Rel.) Home Phone Work Phone Mobile Phone    candy duggan (Mother) -- -- [phone]     EDC " "3/18/2024   G-1 P-0  31/ GESTATIONAL AGE    INFANT WENT TO NICU       Eliecer Meng [[id number]]    Labor Events     labor?: No   steroids?: Full Course  Antibiotics during labor?: Yes  Labor type:  Without Labor  Labor allowed to proceed with plans for an attempted vaginal birth?: No  Presentation    Presentation: Vertex   Delivery    Delivery date/time: 2024 12:01 AM   Delivery type: , Low Transverse   Details: Trial of labor?: No    categorization: primary    priority: emergency   Indications for : Non-Reassuring Fetal Status, Preeclampsia   Skin Incision Type: Pfannensteil        Operative Delivery    Forceps attempted?: Yes  Vacuum extractor attempted?: No  Forceps application location: Outlet       Forceps applied by: DR CORNEJO    Failed?: No                                           Deville Assessment    Living status: Living  Infant family band number: [id number]  Apgars   1 Minute: 5 Minute: 10 Minute 15 Minute 20 Minute   Skin Color: 0 1      Heart Rate: 1 2      Reflex Irritability: 2 2      Muscle Tone: 1 2      Respiratory Effort: 1 2      Total: 5 9              Apgars Assigned By: KATERIN ROMANO  Resuscitation    Method: Suctioning, Oxygen, PPV, Tactile Stimulation, CPAP, Thermal Mattress, Plastic Drape  Suction Details  Suction method: bulb syringe, catheter  Airway suction: mouth, nose  Oxygen Details  O2 concentration (%): 40  PPV duration (seconds): 120  Comment: Infant placed on warmer; no respiratory effort noted. CPAP started with no improvement; PPV started at 1 minute of life. PPV continued and oxygen turned up to 100%. Oxygen weened after infant improved from PPV. Infant transferred to CPAP with O2 at 40%; infant brought to nicu on CPAP +6 40%  Skin to Skin    Reason skin to skin not initiated:  Acuity  Measurements    Weight: 2 lb 11 oz 1220 g Length: 15.5\"         Delivery Information    Episiotomy: " None  Surgical or additional est. blood loss (mL): 0  Combined est. blood loss (mL): 0       History & Physical        Chon Granados MD at 24 1900           Susie Meng  : 2000  MRN: [id number]  CSN: [id number]    History and Physical    Subjective  Randy Meng is a 23 y.o.  who is 31w2d,  known htn/preeclampsia, readmitted for  monitoring until delivery. Pt denied any cns symptoms. Reports good mov,  no bleeding or leaking membranes.   Suspected iugr, , just completed urine showing 1600 mgs of protein, other labs  are stable    History reviewed. No pertinent past medical history.  History reviewed. No pertinent surgical history.  Social History    Tobacco Use      Smoking status: Never      Smokeless tobacco: Never      Current Facility-Administered Medications:     acetaminophen (TYLENOL) tablet 1,000 mg, 1,000 mg, Oral, Once, Raghu Mathis CRNA    famotidine (PEPCID) injection 20 mg, 20 mg, Intravenous, Once, Raghu Mathis CRNA    lactated ringers bolus 1,000 mL, 1,000 mL, Intravenous, Once, Raghu Mathis CRNA    lactated ringers infusion, 125 mL/hr, Intravenous, Continuous, Chon Granados MD, Last Rate: 125 mL/hr at 24 2344, Currently Infusing at 24 2344    lactated ringers infusion, 125 mL/hr, Intravenous, Continuous, Chon Granados MD    lidocaine (LIDODERM) 5 % 1 patch, 1 patch, Transdermal, Q24H, Chon Granados MD, 1 patch at 24 0027    metoclopramide (REGLAN) injection 10 mg, 10 mg, Intravenous, Once, Raghu Mathis CRNA    NIFEdipine XL (PROCARDIA XL) 24 hr tablet 30 mg, 30 mg, Oral, BID, Chon Granados MD, 30 mg at 24 1938    ondansetron (ZOFRAN) injection 4 mg, 4 mg, Intravenous, Once, Raghu Mathis CRNA    Sod Citrate-Citric Acid (BICITRA) oral solution 30 mL, 30 mL, Oral, Once, Raghu Mathis, CRNA    sodium chloride 0.9 % flush 10 mL, 10 mL, Intravenous, Q12H, Chon Granados,  "MD    sodium chloride 0.9 % flush 10 mL, 10 mL, Intravenous, PRN, Chon Granados MD    sodium chloride 0.9 % flush 10 mL, 10 mL, Intravenous, Q12H, Raghu Mathis CRNA    sodium chloride 0.9 % flush 10 mL, 10 mL, Intravenous, PRN, Raghu Mathis CRNA    sodium chloride 0.9 % infusion 40 mL, 40 mL, Intravenous, PRN, Raghu Mathis CRNA    Facility-Administered Medications Ordered in Other Encounters:     bupivacaine PF (MARCAINE) 0.75 % injection, , Intrathecal, PRN, Raghu Mathis CRNA, 1.2 mg at 01/17/24 2353    fentaNYL citrate (PF) (SUBLIMAZE) injection, , Intrathecal, PRN, Raghu Mathis CRNA, 85 mcg at 01/18/24 0016    HYDROmorphone (DILAUDID) injection, , Intrathecal, PRN, Raghu Mathis CRNA, 0.9 mg at 01/18/24 0018    ketorolac (TORADOL) injection, , Intravenous, PRN, Raghu Mathis CRNA, 30 mg at 01/18/24 0009    ondansetron (ZOFRAN) injection, , Intravenous, PRN, Raghu Mathis CRNA, 8 mg at 01/17/24 2347    oxytocin (PITOCIN) injection, , Intravenous, PRN, Raghu Mathis CRNA, 30 Units at 01/18/24 0003    No Known Allergies    Review of Systems      Objective  /81   Pulse 82   Temp 98 °F (36.7 °C) (Oral)   Resp 16   Ht 149.9 cm (59\")   Wt 60.8 kg (134 lb)   Breastfeeding Yes   BMI 27.06 kg/m²   General: well developed; well nourished  no acute distress   Heart: regular rate and rhythm, S1, S2 normal, no murmur, click, rub or gallop   Lungs: breathing is unlabored   Abdomen: soft, non-tender; no masses  no umbilical or inguinal hernias are present  no hepato-splenomegaly   Pelvis:: Clinical staff was present for exam, ft  Efw 1200 grams    Labs  Lab Results   Component Value Date     01/17/2024    HGB 14.0 01/17/2024    HCT 41.6 01/17/2024    WBC 14.20 (H) 01/17/2024     01/17/2024    K 4.0 01/17/2024     01/17/2024    CO2 23.0 01/17/2024    BUN 9 01/17/2024    CREATININE 0.57 01/17/2024    GLUCOSE 120 (H) 01/17/2024 "    ALBUMIN 4.2 2024    CALCIUM 9.7 2024    AST 38 (H) 2024    ALT 39 (H) 2024    BILITOT 0.2 2024        Assessment  31w2d   Preeclampsia  Possible iugr    The risks, benefits, and alternatives of the procedure; along with the risks of anesthesia was discussed in full with the patient and family and all questions were answered.       Plan  Observe, hospital care  until delivery    Chon Granados MD  2024  00:33 CST            Electronically signed by Chon Granados MD at 24 0035          Operative/Procedure Notes (last 48 hours)        Chon Granados MD at 24 8238           Section Procedure Note    Indications: non-reassuring fetal status and chronic htn  with superimposed preeclampsia    Pre-operative Diagnosis: 31 week 2 day pregnancy.    Post-operative Diagnosis: same    Surgeon: Chon Granados MD     Assistants: n/a    Anesthesia: Spinal anesthesia    ASA Class: 1    Procedure Details   The patient was seen in the Holding Room. The risks, benefits, complications, treatment options, and expected outcomes were discussed with the patient.  The patient concurred with the proposed plan, giving informed consent.  The site of surgery properly noted/marked. The patient was taken to Operating Room # 2, identified as Randy Meng and the procedure verified as  Delivery. A Time Out was held and the above information confirmed.    After induction of anesthesia, the patient was draped and prepped in the usual sterile manner. A Pfannenstiel incision was made and carried down through the subcutaneous tissue to the fascia. Fascial incision was made and extended transversely. The fascia was  from the underlying rectus tissue superiorly and inferiorly. The peritoneum was identified and entered. Peritoneal incision was extended longitudinally. The utero-vesical peritoneal reflection was incised transversely and the bladder flap was bluntly freed  from the lower uterine segment. A low transverse uterine incision was made. Delivered from vertex presentation was a female  1220 grams  with Apgar scores of unknown at one minute and unknown at five minutes. After the umbilical cord was clamped and cut cord blood was obtained for evaluation. The placenta was removed intact and appeared normal. The uterine outline, tubes and ovaries appeared normal. The uterine incision was closed with running locked sutures of 0 Vicryl. Hemostasis was observed. Lavage was carried out until clear. The fascia was then reapproximated with running sutures of 0 Vicryl. The skin was reapproximated with staples.    Instrument, sponge, and needle counts were correct prior the abdominal closure and at the conclusion of the case.     Findings:  Female infant, apgar undetermined , placenta intact 3 vessels, normal tubes and ovaries     Estimated Blood Loss:   600 mls           Drains: vaughn            Total IV Fluids: n/aml           Specimens: placenta            Implants: none           Complications:  None; patient tolerated the procedure well.           Disposition: PACU - hemodynamically stable.           Condition: stable    Attending Attestation: I performed the procedure.              Electronically signed by Chon Granados MD at 24 0038

## 2024-01-18 NOTE — PLAN OF CARE
Problem: Adult Inpatient Plan of Care  Goal: Plan of Care Review  Outcome: Ongoing, Progressing  Flowsheets (Taken 2024 1446)  Progress: improving  Plan of Care Reviewed With: patient  Outcome Evaluation: s/p primary  section and magnesium.  CMP and CBC repeated at 1200.  Magnesium sulfate infusion stoped at 1300 and urethral catheter discontinued.  To be transfered to .  Pumping every 2-3 hours.  Scant bleeding.  U/-1 firm without massage.  continues ERAS, has not requested any additional pain  medication.  Goal: Patient-Specific Goal (Individualized)  Outcome: Ongoing, Progressing  Flowsheets (Taken 2024 1446)  Anxieties, Fears or Concerns: anxiety r/t NICU admission for infant.  Goal: Absence of Hospital-Acquired Illness or Injury  Outcome: Ongoing, Progressing  Intervention: Identify and Manage Fall Risk  Recent Flowsheet Documentation  Taken 2024 0730 by Roula Vyas RN  Safety Promotion/Fall Prevention: safety round/check completed  Intervention: Prevent Skin Injury  Recent Flowsheet Documentation  Taken 2024 0730 by Roula Vyas RN  Body Position:   left   side-lying  Intervention: Prevent and Manage VTE (Venous Thromboembolism) Risk  Recent Flowsheet Documentation  Taken 2024 1045 by Roula Vyas RN  Activity Management: up in chair  Taken 2024 1040 by Roula Vyas RN  Activity Management: ambulated to bathroom  Taken 2024 1030 by Roula Vyas RN  VTE Prevention/Management:   bilateral   sequential compression devices on  Taken 2024 0830 by Roula Vyas RN  VTE Prevention/Management:   bilateral   sequential compression devices on  Taken 2024 0730 by Roula Vyas RN  Activity Management: bedrest  Goal: Optimal Comfort and Wellbeing  Outcome: Ongoing, Progressing  Intervention: Provide Person-Centered Care  Recent Flowsheet Documentation  Taken 2024 0730 by Roula Vyas RN  Trust Relationship/Rapport:   care  explained   choices provided   reassurance provided   questions answered   questions encouraged  Goal: Readiness for Transition of Care  Outcome: Ongoing, Progressing     Problem: Hypertensive Disorders in Pregnancy  Goal: Maternal-Fetal Stabilization  Outcome: Ongoing, Progressing     Problem: Adjustment to Role Transition (Postpartum  Delivery)  Goal: Successful Maternal Role Transition  Outcome: Ongoing, Progressing     Problem: Bleeding (Postpartum  Delivery)  Goal: Hemostasis  Outcome: Ongoing, Progressing     Problem: Infection (Postpartum  Delivery)  Goal: Absence of Infection Signs and Symptoms  Outcome: Ongoing, Progressing     Problem: Pain (Postpartum  Delivery)  Goal: Acceptable Pain Control  Outcome: Ongoing, Progressing     Problem: Postoperative Nausea and Vomiting (Postpartum  Delivery)  Goal: Nausea and Vomiting Relief  Outcome: Ongoing, Progressing     Problem: Postoperative Urinary Retention (Postpartum  Delivery)  Goal: Effective Urinary Elimination  Outcome: Ongoing, Progressing   Goal Outcome Evaluation:  Plan of Care Reviewed With: patient        Progress: improving  Outcome Evaluation: s/p primary  section and magnesium.  CMP and CBC repeated at 1200.  Magnesium sulfate infusion stoped at 1300 and urethral catheter discontinued.  To be transfered to .  Pumping every 2-3 hours.  Scant bleeding.  U/-1 firm without massage.  continues ERAS, has not requested any additional pain  medication.

## 2024-01-18 NOTE — ANESTHESIA PREPROCEDURE EVALUATION
Anesthesia Evaluation     NPO Solid Status: Waived due to emergency  NPO Liquid Status: Waived due to emergency           Airway   Mallampati: II  TM distance: >3 FB  Neck ROM: full  No difficulty expected  Dental - normal exam     Pulmonary - negative pulmonary ROS   Cardiovascular   Exercise tolerance: good (4-7 METS)    (+) hypertension      Neuro/Psych- negative ROS  GI/Hepatic/Renal/Endo - negative ROS     Musculoskeletal     Abdominal    Substance History - negative use     OB/GYN    (+) Pregnant, Preeclampsia, pregnancy induced hypertension        Other - negative ROS                   Anesthesia Plan    ASA 2 - emergent     spinal     (Lab Results       Component                Value               Date                       WBC                      14.20 (H)           01/17/2024                 HGB                      14.0                01/17/2024                 HCT                      41.6                01/17/2024                 MCV                      85.6                01/17/2024                 PLT                      186                 01/17/2024            R and b of spinal explained to pt including bleeding, infection, nerve damage, back pain, pdph and poss GA. Pt verbalizes understanding and wishes to proceed.  Surgeon aware of npo violation and wishes to proceed emergently with spinal )  intravenous induction     Anesthetic plan, risks, benefits, and alternatives have been provided, discussed and informed consent has been obtained with: patient.    Use of blood products discussed with patient .      CODE STATUS:    Level Of Support Discussed With: Patient  Code Status (Patient has no pulse and is not breathing): CPR (Attempt to Resuscitate)  Medical Interventions (Patient has pulse or is breathing): Full Support

## 2024-01-19 LAB
CYTO UR: NORMAL
HCT VFR BLD AUTO: 30.8 % (ref 34–46.6)
HGB BLD-MCNC: 10 G/DL (ref 12–15.9)
LAB AP CASE REPORT: NORMAL
Lab: NORMAL
PATH REPORT.FINAL DX SPEC: NORMAL
PATH REPORT.GROSS SPEC: NORMAL

## 2024-01-19 PROCEDURE — 85014 HEMATOCRIT: CPT | Performed by: OBSTETRICS & GYNECOLOGY

## 2024-01-19 PROCEDURE — 25010000002 KETOROLAC TROMETHAMINE PER 15 MG: Performed by: OBSTETRICS & GYNECOLOGY

## 2024-01-19 PROCEDURE — 85018 HEMOGLOBIN: CPT | Performed by: OBSTETRICS & GYNECOLOGY

## 2024-01-19 RX ADMIN — IBUPROFEN 600 MG: 600 TABLET, FILM COATED ORAL at 09:40

## 2024-01-19 RX ADMIN — ACETAMINOPHEN TAB 500 MG 1000 MG: 500 TAB at 00:24

## 2024-01-19 RX ADMIN — IBUPROFEN 600 MG: 600 TABLET, FILM COATED ORAL at 22:08

## 2024-01-19 RX ADMIN — LIDOCAINE PATCH 5% 1 PATCH: 700 PATCH TOPICAL at 09:40

## 2024-01-19 RX ADMIN — ACETAMINOPHEN 650 MG: 325 TABLET, FILM COATED ORAL at 13:07

## 2024-01-19 RX ADMIN — NIFEDIPINE 30 MG: 30 TABLET, FILM COATED, EXTENDED RELEASE ORAL at 08:01

## 2024-01-19 RX ADMIN — IBUPROFEN 600 MG: 600 TABLET, FILM COATED ORAL at 15:54

## 2024-01-19 RX ADMIN — ACETAMINOPHEN 650 MG: 325 TABLET, FILM COATED ORAL at 19:20

## 2024-01-19 RX ADMIN — NIFEDIPINE 30 MG: 30 TABLET, FILM COATED, EXTENDED RELEASE ORAL at 22:08

## 2024-01-19 RX ADMIN — ACETAMINOPHEN 650 MG: 325 TABLET, FILM COATED ORAL at 06:02

## 2024-01-19 RX ADMIN — KETOROLAC TROMETHAMINE 30 MG: 30 INJECTION, SOLUTION INTRAMUSCULAR; INTRAVENOUS at 04:17

## 2024-01-19 NOTE — LACTATION NOTE
This note was copied from a baby's chart.  Name: Nima Smith  Day: 0  Dx: prematurity, low birth weight  Birth Gestation: 31w3d  Adjusted Gestation:   Birth weight: 2-11 (1220g)  Last weight:              % of weight loss:    Feeding Orders: NPO  Maternal Hx: , Preeclampsia, Non-Reassuring Fetal Status during induction resulting in c/section.  Prenatal Medications: PNV, Procardia XL  Pump available: Rx faxed to [facility]  Pumping history in the last 24 hours: Pump provided with instruction 8 hours after delivery    F/U with mom.  Mom just got a shower.  She stated she had been pumping, but not obtaining anything.  Reiterated normalacy of this.  Offered assistance with pumping or hand expressing if needed.  Mom stated she had all supplies needed at this time.  Gave pt lanolin for breast care.       0030  Mom able to pump and collect 3.9 ml colostrum at this time.

## 2024-01-19 NOTE — LACTATION NOTE
This note was copied from a baby's chart.  Name: Nima Smith   Mother's cell: ([phone])  Day: 1  Dx: prematurity, low birth weight  Birth Gestation: 31w3d  Adjusted Gestation: 31w4d  Birth weight: 2-11 (1220g)  Last weight: 2-8.9 (1160g)             % of weight loss:-4.92%    Feeding Orders: 1 ml MBM/DBM   Maternal Hx: , Preeclampsia, Non-Reassuring Fetal Status during induction resulting in c/section.  Prenatal Medications: PNV, Procardia XL  Pump available: Rx faxed to [facility]  Pumping history in the last 24 hours: Pumping approximately 5 ml from each breast every 3 hours    Mother states pumping is going well and she is collecting 5 ml form each breast with her last pump session. She is pumping during the consult and reports flanges are comfortable fit. Reviewed pumping plan, cleaning, and steaming pump parts. Offered assistance and support as needed.

## 2024-01-19 NOTE — PLAN OF CARE
Goal Outcome Evaluation:              Outcome Evaluation: VSS.  Fundus and lochia WDL.  ALT incision with steristrips, dried blood noted on steristrips.  Ambulating and voiding without difficulty.  Denies HA or visual changes.  Pumping and visiting baby in NICU often.  Pain at level of tolerance with ERAs protocol per pt.

## 2024-01-19 NOTE — PROGRESS NOTES
S/p c/s day 2,  Denies any problems,   Ros neg  Exam vitals and labs wnl  A: stable  Plan to keep until tomorrow for bp observation

## 2024-01-19 NOTE — PLAN OF CARE
Goal Outcome Evaluation:  Plan of Care Reviewed With: patient        Progress: improving  Outcome Evaluation: VSS. FFMLU1 scant lochia. ALT w/ steri strips, dried blood noted on steri strips, scant drainage noted. Intact. Ambulating and voiding w/o difficulty. Denies HA, visual changes and epigastric pain. DTR 1+ and no clonus. Procardia given as ordered. Passing flatus. Pumping and visiting infant in NICU.Pain controlled w/ ERAS.

## 2024-01-20 VITALS
OXYGEN SATURATION: 97 % | TEMPERATURE: 98.9 F | BODY MASS INDEX: 27.01 KG/M2 | RESPIRATION RATE: 17 BRPM | HEIGHT: 59 IN | SYSTOLIC BLOOD PRESSURE: 116 MMHG | WEIGHT: 134 LBS | DIASTOLIC BLOOD PRESSURE: 74 MMHG | HEART RATE: 92 BPM

## 2024-01-20 PROBLEM — Z34.90 PREGNANT: Status: RESOLVED | Noted: 2024-01-17 | Resolved: 2024-01-20

## 2024-01-20 RX ORDER — IBUPROFEN 800 MG/1
800 TABLET ORAL EVERY 8 HOURS PRN
Qty: 20 TABLET | Refills: 0 | Status: SHIPPED | OUTPATIENT
Start: 2024-01-20

## 2024-01-20 RX ORDER — NIFEDIPINE 30 MG
30 TABLET, EXTENDED RELEASE ORAL DAILY
Qty: 30 TABLET | Refills: 0 | Status: SHIPPED | OUTPATIENT
Start: 2024-01-20 | End: 2024-02-19

## 2024-01-20 RX ORDER — OXYCODONE HYDROCHLORIDE AND ACETAMINOPHEN 5; 325 MG/1; MG/1
1 TABLET ORAL EVERY 6 HOURS PRN
Qty: 20 TABLET | Refills: 0 | Status: SHIPPED | OUTPATIENT
Start: 2024-01-20

## 2024-01-20 RX ADMIN — ACETAMINOPHEN 650 MG: 325 TABLET, FILM COATED ORAL at 00:59

## 2024-01-20 RX ADMIN — ACETAMINOPHEN 650 MG: 325 TABLET, FILM COATED ORAL at 06:41

## 2024-01-20 RX ADMIN — IBUPROFEN 600 MG: 600 TABLET, FILM COATED ORAL at 10:20

## 2024-01-20 RX ADMIN — NIFEDIPINE 30 MG: 30 TABLET, FILM COATED, EXTENDED RELEASE ORAL at 06:41

## 2024-01-20 RX ADMIN — LIDOCAINE PATCH 5% 1 PATCH: 700 PATCH TOPICAL at 08:50

## 2024-01-20 NOTE — PLAN OF CARE
Goal Outcome Evaluation:  Plan of Care Reviewed With: patient           Outcome Evaluation: VSS. Fundus and lochia WDL. Incision with steristrips, dried blood noted, no additional bleeding. Ambulating. Voiding. Denies headache, epigastric pain, or visual changes. Pumping. Pain controlled effectively with ERAS protocol. Visits baby in NICU frequently

## 2024-01-20 NOTE — PROGRESS NOTES
Post op day 2-3  Pt reports feeling well, ambulating and tolerating po fluids  Vitals noted  Exam unremarkable  A: stable   Plan discharge pt home, instructions given  Procardia xl 30 mgs only once a day  Bp surveillance instructions given  Pain medication discussed  Follow up discussed

## 2024-01-21 NOTE — PAYOR COMM NOTE
"DC HOME 1-20-24    Randy Meng (23 y.o. Female)       Date of Birth   2000    Social Security Number       Address   [address] [place] [address]    Home Phone   [phone]    MRN   [id number]       Episcopal   Gnosticism    Marital Status   Single                            Admission Date   1/17/24    Admission Type   Elective    Admitting Provider   Chon Granados MD    Attending Provider       Department, Room/Bed   [facility] TELMA MOTHER BABY 2A, M236/1       Discharge Date   1/20/2024    Discharge Disposition   Home or Self Care    Discharge Destination                                 Attending Provider: (none)   Allergies: No Known Allergies    Isolation: None   Infection: None   Code Status: Prior    Ht: 149.9 cm (59\")   Wt: 60.8 kg (134 lb)    Admission Cmt: None   Principal Problem: Pregnant [Z34.90]                   Active Insurance as of 1/17/2024       Primary Coverage       Payor Plan Insurance Group Employer/Plan Group    [employer]HE[employer] BLUE CROSS ANTHEM BLUE CROSS BLUE SHIELD PPO [id number]       Payor Plan Address Payor Plan Phone Number Payor Plan Fax Number Effective Dates    [address] [phone]  1/1/2022 - None Entered    [facility] [id number]         Subscriber Name Subscriber Birth Date Member ID       CANDY MENG [birth year]/19/1979 [id number]               Secondary Coverage       Payor Plan Insurance Group Employer/Plan Group    [employer] [place] [employer] MEDICAID        Payor Plan Address Payor Plan Phone Number Payor Plan Fax Number Effective Dates    [address] [address] [phone]  1/16/2024 - None Entered    [facility] [address]         Subscriber Name Subscriber Birth Date Member ID       RANDY MENG 2000 [id number]                     Emergency Contacts        (Rel.) Home Phone Work Phone Mobile Phone    candy meng (Mother) -- -- [phone]              Discharge Summary    No notes of this type exist for this encounter.       "

## 2024-01-22 NOTE — DISCHARGE SUMMARY
Discharge Summary     Susie Meng  : 2000  MRN: [id number]  [facility]: [id number]    Date of Admission: 2024   Date of Discharge:  2024   Delivering Physician: Chon Granados MD       Admission Diagnosis: Pregnant [Z34.90]   Discharge Diagnosis: Pregnancy at 31w3d - delivered, preeclampsia       Procedures:  (LTCS)     Hospital Course  See the completed operative report for details regarding antepartum course and delivery.    Her post-operative course was unremarkable.  On POD # 3 she felt like she ready ready for D/C.  She was evaluated by Dr. granados  who agreed she was able to be discharged to home.  She had no febrile morbidity. She had normal bowel and bladder function and was hemodynamically stable.  Her wound was healing well without obvious signs of infections.    Infant  female  fetus weighing 1220 g (2 lb 11 oz)   Apgars -  5 @ 1 minute /  9 @ 5 minutes.    Discharge labs  Lab Results   Component Value Date    WBC 27.04 (H) 2024    HGB 10.0 (L) 2024    HCT 30.8 (L) 2024     2024       Discharge Medications     Discharge Medications        New Medications        Instructions Start Date   ibuprofen 800 MG tablet  Commonly known as: ADVIL,MOTRIN   800 mg, Oral, Every 8 Hours PRN      oxyCODONE-acetaminophen 5-325 MG per tablet  Commonly known as: Percocet   1 tablet, Oral, Every 6 Hours PRN             Changes to Medications        Instructions Start Date   NIFEdipine CC 30 MG 24 hr tablet  Commonly known as: ADALAT CC  What changed:   medication strength  when to take this   30 mg, Oral, Daily             Continue These Medications        Instructions Start Date   prenatal (CLASSIC) vitamin 28-0.8 MG tablet tablet  Generic drug: prenatal vitamin   Oral, Daily               Discharge Disposition Home or Self Care   Condition on Discharge: good   Follow-up: 1 week with  feng Granados MD  2024

## 2024-01-29 ENCOUNTER — MATERNAL SCREENING (OUTPATIENT)
Dept: CALL CENTER | Facility: HOSPITAL | Age: 24
End: 2024-01-29
Payer: COMMERCIAL

## 2024-01-29 NOTE — OUTREACH NOTE
Maternal Screening Survey      Flowsheet Row Responses   Facility patient discharged from? [facility]   Attempt successful? No   Unsuccessful attempts Attempt 2   Revoke Other  [Spoke with Randy's mother who reports her daughter is doing well. Her mother is at work and Randy is presently not with her. No other number to reach patient.]              Moustapha BYRNE - Registered Nurse

## 2024-01-29 NOTE — OUTREACH NOTE
Maternal Screening Survey      Flowsheet Row Responses   Facility patient discharged from? [facility]   Attempt successful? No   Unsuccessful attempts Attempt 1   Revoke --  [mailbox full]              Moustapha BYRNE - Registered Nurse

## 2024-03-23 ENCOUNTER — OFFICE VISIT (OUTPATIENT)
Age: 24
End: 2024-03-23
Payer: MEDICAID

## 2024-03-23 DIAGNOSIS — N30.01 ACUTE CYSTITIS WITH HEMATURIA: Primary | ICD-10-CM

## 2024-03-23 DIAGNOSIS — R30.0 DYSURIA: ICD-10-CM

## 2024-03-23 DIAGNOSIS — N89.8 VAGINAL IRRITATION: ICD-10-CM

## 2024-03-23 LAB
APPEARANCE FLUID: CLEAR
BILIRUBIN, POC: ABNORMAL
BLOOD URINE, POC: ABNORMAL
CLARITY, POC: CLEAR
COLOR, POC: YELLOW
CONTROL: PRESENT
GLUCOSE URINE, POC: ABNORMAL
KETONES, POC: ABNORMAL
LEUKOCYTE EST, POC: ABNORMAL
NITRITE, POC: POSITIVE
PH, POC: 5.5
PREGNANCY TEST URINE, POC: NEGATIVE
PROTEIN, POC: ABNORMAL
SPECIFIC GRAVITY, POC: 1.03
UROBILINOGEN, POC: 0.2

## 2024-03-23 PROCEDURE — G8419 CALC BMI OUT NRM PARAM NOF/U: HCPCS

## 2024-03-23 PROCEDURE — 81002 URINALYSIS NONAUTO W/O SCOPE: CPT

## 2024-03-23 PROCEDURE — 1036F TOBACCO NON-USER: CPT

## 2024-03-23 PROCEDURE — 99214 OFFICE O/P EST MOD 30 MIN: CPT

## 2024-03-23 PROCEDURE — 81025 URINE PREGNANCY TEST: CPT

## 2024-03-23 PROCEDURE — G8484 FLU IMMUNIZE NO ADMIN: HCPCS

## 2024-03-23 PROCEDURE — G8427 DOCREV CUR MEDS BY ELIG CLIN: HCPCS

## 2024-03-23 RX ORDER — CEFDINIR 300 MG/1
300 CAPSULE ORAL 2 TIMES DAILY
Qty: 14 CAPSULE | Refills: 0 | Status: SHIPPED | OUTPATIENT
Start: 2024-03-23 | End: 2024-03-30

## 2024-03-23 ASSESSMENT — ENCOUNTER SYMPTOMS
DIARRHEA: 0
WHEEZING: 0
VOMITING: 0
COUGH: 0
EYE DISCHARGE: 0
SINUS PAIN: 0
EYE PAIN: 0
TROUBLE SWALLOWING: 0
ABDOMINAL PAIN: 0
SHORTNESS OF BREATH: 0
NAUSEA: 0
SINUS PRESSURE: 0
COLOR CHANGE: 0
FACIAL SWELLING: 0
SORE THROAT: 0
ABDOMINAL DISTENTION: 0
CONSTIPATION: 0
EYE REDNESS: 0
CHEST TIGHTNESS: 0
CHOKING: 0
APNEA: 0

## 2024-03-23 NOTE — PROGRESS NOTES
[facility]  [facility]  [address] [place] [address] DRIVE  [place] [address]  Dept: [phone]  Dept Fax: [phone]  Loc: [phone]    Hien Chery is a 23 y.o. female who presents today for her medical conditions/complaints as noted below.  Hien Chery is complaining of Dysuria, Urinary Frequency, and Chills (Started today)        HPI:   Patient present to the clinic today with complaints of burning with urination, vaginal irritation, urinary frequency, urinary urgency, chills.  Denies fever, body aches, blood in urine, flank pain.  Symptoms started today.  Denies history of pyelonephritis, hospitalization due to UTI, other kidney problems.  Patient recently gave birth in January and is currently breast-feeding.  No alleviating factors reported for current symptoms.  Symptoms are moderate.  Patient is stable.    Dysuria   Associated symptoms include chills, frequency and urgency. Pertinent negatives include no flank pain, hematuria, nausea or vomiting.   Urinary Frequency   Associated symptoms include chills, frequency and urgency. Pertinent negatives include no flank pain, hematuria, nausea or vomiting.       Past Medical History:   Diagnosis Date    Genital herpes        Past Surgical History:   Procedure Laterality Date    EXTERNAL EAR SURGERY Left        Family History   Problem Relation Age of Onset    Other Mother         Irregular menses    Diabetes Maternal Grandmother     Cancer Maternal Grandfather        Social History     Tobacco Use    Smoking status: Never    Smokeless tobacco: Never   Substance Use Topics    Alcohol use: No     Alcohol/week: 0.0 standard drinks of alcohol        Current Outpatient Medications   Medication Sig Dispense Refill    cefdinir (OMNICEF) 300 MG capsule Take 1 capsule by mouth 2 times daily for 7 days 14 capsule 0    NIFEdipine (PROCARDIA XL) 30 MG extended release tablet Take 1 tablet by mouth daily 30 tablet 1    Prenatal

## 2024-03-23 NOTE — PATIENT INSTRUCTIONS
[facility] testing sent to lab; will call with results    Urine culture sent to lab.  Will call with results.    Urine pregnancy test was negative today.    Cefdinir prescribed.  Take full course of antibiotics.  Further treatment pending lab results    Abstain from sexual activity until labs return and treatment is complete.     Increase water intake    Avoid baths or hot tubs    The patient is to follow up with PCP/GYN or return to clinic if symptoms worsen/fail to improve.    Any condition can change, despite proper treatment. Therefore, if symptoms still persist or worsen after treatment plan intitated today, either go to the nearest ER, or call PCP, or return to UC for further evaluation.    Urgent Care evaluation today is not a substitute for PCP visit. Follow up care is your responsibility to discuss and review this UC visit.

## 2024-03-24 VITALS
OXYGEN SATURATION: 99 % | TEMPERATURE: 97.6 F | WEIGHT: 131 LBS | HEART RATE: 100 BPM | SYSTOLIC BLOOD PRESSURE: 110 MMHG | DIASTOLIC BLOOD PRESSURE: 60 MMHG | BODY MASS INDEX: 26.46 KG/M2 | RESPIRATION RATE: 20 BRPM

## 2024-03-26 ENCOUNTER — TELEPHONE (OUTPATIENT)
Age: 24
End: 2024-03-26

## 2024-03-26 DIAGNOSIS — N76.0 ACUTE VAGINITIS: Primary | ICD-10-CM

## 2024-03-26 LAB
BACTERIA UR CULT: ABNORMAL
BACTERIA UR CULT: ABNORMAL
ORGANISM: ABNORMAL

## 2024-03-26 RX ORDER — CLINDAMYCIN HYDROCHLORIDE 300 MG/1
300 CAPSULE ORAL 2 TIMES DAILY
Qty: 14 CAPSULE | Refills: 0 | Status: SHIPPED | OUTPATIENT
Start: 2024-03-26 | End: 2024-04-02

## 2024-03-26 NOTE — TELEPHONE ENCOUNTER
[facility] send out was positive for atopobium vaginae (acute vaginitis). Will send in antibiotic twice a day x 7 days. Antibiotic is safe to take while breast feeding. Complete entire therapy. Prescription sent to [facility]. Follow up with PCP or OB/GYN if symptoms do not improve.    Urine culture still pending.

## 2024-04-22 DIAGNOSIS — R21 RASH: ICD-10-CM

## 2024-04-22 DIAGNOSIS — N90.89 VULVAR LESION: ICD-10-CM

## 2024-04-22 RX ORDER — VALACYCLOVIR HYDROCHLORIDE 1 G/1
1000 TABLET, FILM COATED ORAL 2 TIMES DAILY
Qty: 20 TABLET | Refills: 0 | Status: SHIPPED | OUTPATIENT
Start: 2024-04-22 | End: 2024-05-02

## 2024-08-12 NOTE — TELEPHONE ENCOUNTER
Ct Sinclair is a 44 year old female.  Chief Complaint   Patient presents with    Physical    Ear Problem     HPI:   Ct Sinclair presented to the clinic for annual physical examination. No acute concerns. No changes in family/personal history. Normal Sleep. Normal appetite. Balanced diet. Normal BM/Urination. Physically active.  Sexually active. One partner.  LMP: , 8/10/24, regular cycles. Follows with OB. Has appointment on Thursday for pap smear.     Current Outpatient Medications   Medication Sig Dispense Refill    montelukast 10 MG Oral Tab Take 1 tablet (10 mg total) by mouth nightly. (Patient not taking: Reported on 8/12/2024) 30 tablet 3    loratadine 10 MG Oral Tab Take 1 tablet (10 mg total) by mouth daily. (Patient not taking: Reported on 8/12/2024) 30 tablet 3    azelastine 0.1 % Nasal Solution 2 sprays by Nasal route 2 (two) times daily. (Patient not taking: Reported on 8/12/2024) 30 mL 3    fluticasone propionate 50 MCG/ACT Nasal Suspension 1 spray by Each Nare route daily. (Patient not taking: Reported on 8/12/2024) 16 g 0    Multiple Vitamins-Minerals ([facility] OR) Take by mouth.        Past Medical History:    Gestational diabetes (HCC)    GDM    Human papilloma virus infection      Past Surgical History:   Procedure Laterality Date    Colposcopy, cervix w/upper adjacent vagina; w/biopsy(s), cervix      Leep        Social History:  Social History     Socioeconomic History    Marital status: Single   Occupational History    Occupation:    Tobacco Use    Smoking status: Never    Smokeless tobacco: Never   Vaping Use    Vaping status: Never Used   Substance and Sexual Activity    Alcohol use: Not Currently    Drug use: Yes     Types: Other-see comments     Comment: hookah    Sexual activity: Yes     Partners: Female   Other Topics Concern    Blood Transfusions No   Social History Narrative    Live with daughter     Has long term partner Angelina Narvaez      Family History   Problem Relation  Unable to leave voicemail. Age of Onset    No Known Problems Father     High Cholesterol Mother     Cancer Maternal Grandmother         unknown primary    No Known Problems Maternal Grandfather     Dementia Paternal Grandmother 80    Breast Cancer Neg     Colon Cancer Neg     Ovarian Cancer Neg       Allergies   Allergen Reactions    Fish-Derived Products UNKNOWN        REVIEW OF SYSTEMS:   Review of Systems   Constitutional: Negative.  Negative for activity change.   HENT:  Positive for tinnitus (swooshing in the left ear).    Eyes: Negative.    Respiratory: Negative.  Negative for chest tightness and shortness of breath.    Cardiovascular: Negative.  Negative for chest pain.   Gastrointestinal:  Negative for abdominal distention, abdominal pain, constipation, diarrhea and nausea.   Genitourinary: Negative.  Negative for difficulty urinating, menstrual problem and vaginal discharge.   Musculoskeletal: Negative.    Skin: Negative.    Neurological: Negative.    Psychiatric/Behavioral: Negative.        Wt Readings from Last 5 Encounters:   08/12/24 165 lb (74.8 kg)   11/10/23 165 lb (74.8 kg)   07/19/23 165 lb (74.8 kg)   07/03/23 163 lb (73.9 kg)   07/08/21 155 lb (70.3 kg)     Body mass index is 26.63 kg/m².      EXAM:   /90   Pulse 101   Temp 98.1 °F (36.7 °C)   Ht 5' 6\" (1.676 m)   Wt 165 lb (74.8 kg)   LMP 08/09/2024 (Approximate)   BMI 26.63 kg/m²   Physical Exam  Vitals reviewed.   Constitutional:       Appearance: Normal appearance.   HENT:      Head: Normocephalic and atraumatic.      Right Ear: Tympanic membrane normal.      Left Ear: Tympanic membrane normal.      Mouth/Throat:      Mouth: Mucous membranes are moist.      Pharynx: Oropharynx is clear.   Eyes:      Pupils: Pupils are equal, round, and reactive to light.   Cardiovascular:      Rate and Rhythm: Normal rate and regular rhythm.      Pulses: Normal pulses.      Heart sounds: Normal heart sounds.   Pulmonary:      Effort: Pulmonary effort is normal.      Breath  sounds: Normal breath sounds.   Chest:   Breasts:     Right: Normal.      Left: Normal.   Abdominal:      General: Abdomen is flat. There is no distension.      Palpations: Abdomen is soft. There is no mass.      Tenderness: There is no abdominal tenderness. There is no guarding or rebound.      Hernia: No hernia is present.   Musculoskeletal:         General: Normal range of motion.      Cervical back: Normal range of motion.   Lymphadenopathy:      Upper Body:      Right upper body: No axillary adenopathy.      Left upper body: No axillary adenopathy.   Skin:     General: Skin is warm.   Neurological:      General: No focal deficit present.      Mental Status: She is alert and oriented to person, place, and time.   Psychiatric:         Mood and Affect: Mood normal.         Behavior: Behavior normal.          ASSESSMENT AND PLAN:   (Z01.419) Well woman exam with routine gynecological exam  (primary encounter diagnosis)  Plan: TSH W Reflex To Free T4 [E], Lipid Panel [E],         Basic Metabolic Panel (8) [E]        - Immunizations UTD   - tobacco, alcohol, illicit drug use discouraged  - safe sexual practices advised  - Reinforced healthy diet, lifestyle, and exercise.  - Past Medical/Social/Family histories reviewed  - Regular dental visits recommended   - Regular eye exams recommended     Health Maintenance   Topic Date Due    Pap Smear  07/08/2024     Health Maintenance   Topic Date Due    Mammogram  08/16/2024        (Z12.4) Cervical cancer screening  Plan: has appointment on Thursday with OB for PAP smear.     (Z12.31) Encounter for screening mammogram for malignant neoplasm of breast  Plan: [facility] MAIK 2D+3D SCREENING BILAT         (CPT=77067/03348)        Order placed for mammogram. Advised to schedule.     (Z11.3) Screening examination for STD (sexually transmitted disease)  Plan: HIV AG AB Combo [E], T Pallidum Screening         Cascade TREP [E], Chlamydia/Gc Amplification         [E]        STD screening  completed today. Further management pending results.     (R03.0) Blood pressure elevated without history of HTN  Plan: BP elevated in office. Improved during visit. Asymptomatic. Checked manually. Advised dash diet. Regular activity. Will continue to monitor.     (H91.8X3) Other specified hearing loss of both ears  Plan: patient with hearing loss of both ears. Swooshing to the left ear x 1 year. Has followed with ENT. Reviewed note. Was recommended singular, loratadine, and azelastine. Did not take medication due to concern for side effects. Advised side effects. Advised to follow up with ENT to discuss further.     Follow up in 1 year or sooner if needed      The patient indicates understanding of these issues and agrees to the plan.  Chaperone offered to the patient prior to examination    This note was prepared using Dragon Medical voice recognition dictation software. As a result errors may occur. When identified these errors have been corrected. While every attempt is made to correct errors during dictation discrepancies may still exist.

## 2024-09-10 ENCOUNTER — OFFICE VISIT (OUTPATIENT)
Age: 24
End: 2024-09-10
Payer: COMMERCIAL

## 2024-09-10 VITALS
WEIGHT: 135.2 LBS | DIASTOLIC BLOOD PRESSURE: 70 MMHG | TEMPERATURE: 97.8 F | HEIGHT: 59 IN | RESPIRATION RATE: 20 BRPM | OXYGEN SATURATION: 98 % | HEART RATE: 85 BPM | BODY MASS INDEX: 27.26 KG/M2 | SYSTOLIC BLOOD PRESSURE: 120 MMHG

## 2024-09-10 DIAGNOSIS — R30.0 DYSURIA: ICD-10-CM

## 2024-09-10 DIAGNOSIS — N89.8 VAGINAL IRRITATION: Primary | ICD-10-CM

## 2024-09-10 LAB
APPEARANCE FLUID: CLEAR
BILIRUBIN, POC: NORMAL
BLOOD URINE, POC: NORMAL
CLARITY, POC: CLEAR
COLOR, POC: YELLOW
GLUCOSE URINE, POC: NORMAL MG/DL
KETONES, POC: NORMAL MG/DL
LEUKOCYTE EST, POC: NORMAL
NITRITE, POC: NORMAL
PH, POC: 6
PROTEIN, POC: NORMAL MG/DL
SPECIFIC GRAVITY, POC: 1.02
UROBILINOGEN, POC: NORMAL MG/DL

## 2024-09-10 PROCEDURE — G8419 CALC BMI OUT NRM PARAM NOF/U: HCPCS | Performed by: NURSE PRACTITIONER

## 2024-09-10 PROCEDURE — G8427 DOCREV CUR MEDS BY ELIG CLIN: HCPCS | Performed by: NURSE PRACTITIONER

## 2024-09-10 PROCEDURE — 81002 URINALYSIS NONAUTO W/O SCOPE: CPT | Performed by: NURSE PRACTITIONER

## 2024-09-10 PROCEDURE — 99213 OFFICE O/P EST LOW 20 MIN: CPT | Performed by: NURSE PRACTITIONER

## 2024-09-10 PROCEDURE — 1036F TOBACCO NON-USER: CPT | Performed by: NURSE PRACTITIONER

## 2024-09-10 ASSESSMENT — ENCOUNTER SYMPTOMS
CHEST TIGHTNESS: 0
EYE PAIN: 0
EYE DISCHARGE: 0
TROUBLE SWALLOWING: 0
COLOR CHANGE: 0
STRIDOR: 0
SORE THROAT: 0
ABDOMINAL DISTENTION: 0
SINUS PRESSURE: 0
SHORTNESS OF BREATH: 0
COUGH: 0
WHEEZING: 0
ABDOMINAL PAIN: 0

## 2024-09-11 DIAGNOSIS — B96.89 BV (BACTERIAL VAGINOSIS): Primary | ICD-10-CM

## 2024-09-11 DIAGNOSIS — N76.0 BV (BACTERIAL VAGINOSIS): Primary | ICD-10-CM

## 2024-09-11 RX ORDER — METRONIDAZOLE 500 MG/1
500 TABLET ORAL 2 TIMES DAILY
Qty: 14 TABLET | Refills: 0 | Status: SHIPPED | OUTPATIENT
Start: 2024-09-11 | End: 2024-09-18

## 2024-09-11 RX ORDER — DOXYCYCLINE HYCLATE 100 MG
100 TABLET ORAL 2 TIMES DAILY
Qty: 20 TABLET | Refills: 0 | Status: SHIPPED | OUTPATIENT
Start: 2024-09-11 | End: 2024-09-21

## 2024-09-12 LAB — BACTERIA UR CULT: NORMAL

## 2024-11-13 ENCOUNTER — APPOINTMENT (OUTPATIENT)
Dept: CT IMAGING | Age: 24
End: 2024-11-13
Payer: COMMERCIAL

## 2024-11-13 ENCOUNTER — HOSPITAL ENCOUNTER (EMERGENCY)
Age: 24
Discharge: HOME | End: 2024-11-13
Attending: EMERGENCY MEDICINE
Payer: COMMERCIAL

## 2024-11-13 ENCOUNTER — APPOINTMENT (OUTPATIENT)
Age: 24
End: 2024-11-13
Payer: COMMERCIAL

## 2024-11-13 VITALS
WEIGHT: 136 LBS | OXYGEN SATURATION: 100 % | HEART RATE: 86 BPM | TEMPERATURE: 98.9 F | BODY MASS INDEX: 27.42 KG/M2 | DIASTOLIC BLOOD PRESSURE: 90 MMHG | RESPIRATION RATE: 17 BRPM | HEIGHT: 59 IN | SYSTOLIC BLOOD PRESSURE: 100 MMHG

## 2024-11-13 DIAGNOSIS — N39.0 URINARY TRACT INFECTION WITHOUT HEMATURIA, SITE UNSPECIFIED: ICD-10-CM

## 2024-11-13 DIAGNOSIS — R00.2 PALPITATIONS: ICD-10-CM

## 2024-11-13 DIAGNOSIS — R10.9 RIGHT FLANK PAIN: Primary | ICD-10-CM

## 2024-11-13 LAB
ALBUMIN SERPL-MCNC: 4.3 G/DL (ref 3.5–5.2)
ALP SERPL-CCNC: 67 U/L (ref 35–104)
ALT SERPL-CCNC: 14 U/L (ref 5–33)
ANION GAP SERPL CALCULATED.3IONS-SCNC: 13 MMOL/L (ref 7–19)
AST SERPL-CCNC: 19 U/L (ref 5–32)
BACTERIA #/AREA URNS HPF: ABNORMAL /HPF
BASOPHILS # BLD: 0 K/UL (ref 0–0.2)
BASOPHILS NFR BLD: 0.3 % (ref 0–1)
BILIRUB SERPL-MCNC: 0.4 MG/DL (ref 0.2–1.2)
BILIRUB UR QL STRIP: NEGATIVE
BUN SERPL-MCNC: 10 MG/DL (ref 6–20)
CALCIUM SERPL-MCNC: 9.2 MG/DL (ref 8.6–10)
CHLORIDE SERPL-SCNC: 101 MMOL/L (ref 98–111)
CLARITY UR: ABNORMAL
CO2 SERPL-SCNC: 23 MMOL/L (ref 22–29)
COLOR UR: ABNORMAL
CREAT SERPL-MCNC: 0.8 MG/DL (ref 0.5–0.9)
ECHO BSA: 1.6 M2
EOSINOPHIL # BLD: 0 K/UL (ref 0–0.6)
EOSINOPHIL NFR BLD: 0.2 % (ref 0–5)
ERYTHROCYTE [DISTWIDTH] IN BLOOD BY AUTOMATED COUNT: 12.1 % (ref 11.5–14.5)
GLUCOSE SERPL-MCNC: 98 MG/DL (ref 70–99)
GLUCOSE UR STRIP.AUTO-MCNC: NEGATIVE MG/DL
HCG UR QL: NEGATIVE
HCT VFR BLD AUTO: 49.9 % (ref 37–47)
HGB BLD-MCNC: 16.4 G/DL (ref 12–16)
HGB UR STRIP.AUTO-MCNC: NEGATIVE MG/L
IMM GRANULOCYTES # BLD: 0 K/UL
KETONES UR STRIP.AUTO-MCNC: 15 MG/DL
LEUKOCYTE ESTERASE UR QL STRIP.AUTO: ABNORMAL
LIPASE SERPL-CCNC: 11 U/L (ref 13–60)
LYMPHOCYTES # BLD: 3.4 K/UL (ref 1.1–4.5)
LYMPHOCYTES NFR BLD: 26.9 % (ref 20–40)
MAGNESIUM SERPL-MCNC: 2.1 MG/DL (ref 1.6–2.6)
MCH RBC QN AUTO: 28.1 PG (ref 27–31)
MCHC RBC AUTO-ENTMCNC: 32.9 G/DL (ref 33–37)
MCV RBC AUTO: 85.4 FL (ref 81–99)
MONOCYTES # BLD: 0.8 K/UL (ref 0–0.9)
MONOCYTES NFR BLD: 6.6 % (ref 0–10)
NEUTROPHILS # BLD: 8.2 K/UL (ref 1.5–7.5)
NEUTS SEG NFR BLD: 65.8 % (ref 50–65)
NITRITE UR QL STRIP.AUTO: NEGATIVE
PH UR STRIP.AUTO: 5.5 [PH] (ref 5–8)
PLATELET # BLD AUTO: 287 K/UL (ref 130–400)
PMV BLD AUTO: 10.5 FL (ref 9.4–12.3)
POTASSIUM SERPL-SCNC: 4 MMOL/L (ref 3.5–5)
PROT SERPL-MCNC: 7.1 G/DL (ref 6.4–8.3)
PROT UR STRIP.AUTO-MCNC: 30 MG/DL
RBC # BLD AUTO: 5.84 M/UL (ref 4.2–5.4)
RBC #/AREA URNS HPF: ABNORMAL /HPF (ref 0–2)
SODIUM SERPL-SCNC: 137 MMOL/L (ref 136–145)
SP GR UR STRIP.AUTO: 1.03 (ref 1–1.03)
SQUAMOUS #/AREA URNS HPF: ABNORMAL /HPF
TROPONIN, HIGH SENSITIVITY: 20 NG/L (ref 0–14)
TROPONIN, HIGH SENSITIVITY: 24 NG/L (ref 0–14)
UROBILINOGEN UR STRIP.AUTO-MCNC: 1 E.U./DL
WBC # BLD AUTO: 12.5 K/UL (ref 4.8–10.8)
WBC #/AREA URNS HPF: ABNORMAL /HPF (ref 0–5)

## 2024-11-13 PROCEDURE — 36415 COLL VENOUS BLD VENIPUNCTURE: CPT

## 2024-11-13 PROCEDURE — 96374 THER/PROPH/DIAG INJ IV PUSH: CPT

## 2024-11-13 PROCEDURE — 93005 ELECTROCARDIOGRAM TRACING: CPT | Performed by: EMERGENCY MEDICINE

## 2024-11-13 PROCEDURE — 83690 ASSAY OF LIPASE: CPT

## 2024-11-13 PROCEDURE — 93246 EXT ECG>7D<15D RECORDING: CPT

## 2024-11-13 PROCEDURE — 74176 CT ABD & PELVIS W/O CONTRAST: CPT

## 2024-11-13 PROCEDURE — 85025 COMPLETE CBC W/AUTO DIFF WBC: CPT

## 2024-11-13 PROCEDURE — 99284 EMERGENCY DEPT VISIT MOD MDM: CPT

## 2024-11-13 PROCEDURE — 6360000002 HC RX W HCPCS: Performed by: EMERGENCY MEDICINE

## 2024-11-13 PROCEDURE — 2580000003 HC RX 258: Performed by: EMERGENCY MEDICINE

## 2024-11-13 PROCEDURE — 84484 ASSAY OF TROPONIN QUANT: CPT

## 2024-11-13 PROCEDURE — 81001 URINALYSIS AUTO W/SCOPE: CPT

## 2024-11-13 PROCEDURE — 96375 TX/PRO/DX INJ NEW DRUG ADDON: CPT

## 2024-11-13 PROCEDURE — 84703 CHORIONIC GONADOTROPIN ASSAY: CPT

## 2024-11-13 PROCEDURE — 87086 URINE CULTURE/COLONY COUNT: CPT

## 2024-11-13 PROCEDURE — 80053 COMPREHEN METABOLIC PANEL: CPT

## 2024-11-13 PROCEDURE — 83735 ASSAY OF MAGNESIUM: CPT

## 2024-11-13 RX ORDER — KETOROLAC TROMETHAMINE 30 MG/ML
30 INJECTION, SOLUTION INTRAMUSCULAR; INTRAVENOUS ONCE
Status: COMPLETED | OUTPATIENT
Start: 2024-11-13 | End: 2024-11-13

## 2024-11-13 RX ORDER — 0.9 % SODIUM CHLORIDE 0.9 %
1000 INTRAVENOUS SOLUTION INTRAVENOUS ONCE
Status: COMPLETED | OUTPATIENT
Start: 2024-11-13 | End: 2024-11-13

## 2024-11-13 RX ORDER — CEPHALEXIN 500 MG/1
500 CAPSULE ORAL 4 TIMES DAILY
Qty: 40 CAPSULE | Refills: 0 | Status: SHIPPED | OUTPATIENT
Start: 2024-11-13 | End: 2024-11-23

## 2024-11-13 RX ADMIN — WATER 1000 MG: 1 INJECTION INTRAMUSCULAR; INTRAVENOUS; SUBCUTANEOUS at 12:12

## 2024-11-13 RX ADMIN — KETOROLAC TROMETHAMINE 30 MG: 30 INJECTION, SOLUTION INTRAMUSCULAR at 11:41

## 2024-11-13 RX ADMIN — SODIUM CHLORIDE 1000 ML: 9 INJECTION, SOLUTION INTRAVENOUS at 11:01

## 2024-11-13 ASSESSMENT — PAIN DESCRIPTION - LOCATION: LOCATION: FLANK

## 2024-11-13 ASSESSMENT — PAIN DESCRIPTION - ORIENTATION: ORIENTATION: RIGHT

## 2024-11-13 ASSESSMENT — PAIN - FUNCTIONAL ASSESSMENT: PAIN_FUNCTIONAL_ASSESSMENT: 0-10

## 2024-11-13 ASSESSMENT — PAIN DESCRIPTION - PAIN TYPE: TYPE: ACUTE PAIN

## 2024-11-13 ASSESSMENT — PAIN DESCRIPTION - DESCRIPTORS: DESCRIPTORS: SHARP

## 2024-11-13 NOTE — ED PROVIDER NOTES
reviewed    General:  Awake, alert, NAD.   HEENT: Normocephalic, atraumatic. EOMI. Mucous membranes are moist. No visible drainge from ears or nose.  Neck:  Normal ROM.  No meningismus.   Cardiovascular:  Regular rate and regular rhythm.  No appreciable murmurs. Radial pulses are 2+. No cyanosis.   Lungs/Chest: No respiratory distress. There are no audible wheezes. No appreciable rales, rhonchi or wheezes. No stridor. Speaking in full sentences.  Equal chest rise with inspiration. No accessory muscle usage.   Abdomen: Soft, mild right upper lateral abdominal TTP, non distended.  No rebound, gurading, rigidity.  Back: No CVA tenderness. No midline bony tenderness.   Extremities: Normal ROM, no edema. No calf tenderness. No appreciable joint swelling.  Skin:  Warm, dry. No visible rashes. No cyanosis, erythema or pallor.   Neurologic:  Awake, alert, oriented to person, place, time, situation. Speech is clear  Psychiatric:  Normal interaction and behavior.      DIAGNOSTIC RESULTS     EKG: All EKG's are interpreted by the Emergency Department Physician who either signs or Co-signs this chart in the absence of a cardiologist.    EKG: interpreted by me 10:10 AM normal sinus rhythm rate of 95 bpm, normal axis, QTc 444, no STEMI.  No prior EKG available for comparison.     Cardiac monitor: observed by me for 10 seconds during patient encounter, normal sinus rhythm rate in the 70s, no ectopy. Reason for cardiac monitoring: Palpitations to assess for arrhythmia.     RADIOLOGY:        CT ABDOMEN PELVIS WO CONTRAST Additional Contrast? None   Final Result   1.  Nonobstructive right-sided nephrolithiasis.   2.  No urinary or bowel obstruction and normal appendix   3.  No acute inflammatory process identified within the abdomen or pelvis within the limitation of a noncontrast enhanced examination.        All CT scans are performed using dose optimization techniques as appropriate to the performed exam and include    at least one

## 2024-11-14 ENCOUNTER — CARE COORDINATION (OUTPATIENT)
Dept: OTHER | Facility: CLINIC | Age: 24
End: 2024-11-14

## 2024-11-14 LAB
EKG P AXIS: 45 DEGREES
EKG P-R INTERVAL: 144 MS
EKG Q-T INTERVAL: 356 MS
EKG QRS DURATION: 76 MS
EKG QTC CALCULATION (BAZETT): 415 MS
EKG T AXIS: 37 DEGREES

## 2024-11-14 PROCEDURE — 93010 ELECTROCARDIOGRAM REPORT: CPT | Performed by: INTERNAL MEDICINE

## 2024-11-14 NOTE — CARE COORDINATION
Ambulatory Care Coordination Note     11/14/2024 10:03 AM     Patient outreach attempt by this ACM today to offer care management services. ACM was unable to reach the patient by telephone today;   left voice message requesting a return phone call to this ACM.     ACM: Evita Ashley RN     Care Summary Note: ED visit 11/13/24    PCP/Specialist follow up:   Future Appointments         Provider Specialty Dept Phone    11/18/2024 2:30 PM Renata Goodman MD Obstetrics and Gynecology [phone]            Follow Up:   Plan for next ACM outreach in approximately 1-2 days  to complete:  - outreach attempt to offer care management services.

## 2024-11-15 ENCOUNTER — CARE COORDINATION (OUTPATIENT)
Dept: OTHER | Facility: CLINIC | Age: 24
End: 2024-11-15

## 2024-11-15 LAB — BACTERIA UR CULT: NORMAL

## 2024-11-15 NOTE — CARE COORDINATION
Ambulatory Care Coordination Note     11/15/2024 1:58 PM     Patient outreach attempt by this ACM today to offer care management services. ACM was unable to reach the patient by telephone today;   left voice message requesting a return phone call to this ACM.  Netatmot message sent requesting patient to contact this ACM.     ACM: Evita Ashley RN     Care Summary Note: Unable to Reach letter sent via [employer].    PCP/Specialist follow up:   Future Appointments         Provider Specialty Dept Phone    11/18/2024 2:30 PM Renata Goodman MD Obstetrics and Gynecology [phone]            Follow Up:   Plan for next ACM outreach in approximately 1 week to complete:  - outreach attempt to offer care management services.

## 2024-11-18 ENCOUNTER — TELEPHONE (OUTPATIENT)
Dept: OBGYN CLINIC | Age: 24
End: 2024-11-18

## 2024-11-18 ENCOUNTER — PREP FOR PROCEDURE (OUTPATIENT)
Dept: OBGYN CLINIC | Age: 24
End: 2024-11-18

## 2024-11-22 ENCOUNTER — OFFICE VISIT (OUTPATIENT)
Age: 24
End: 2024-11-22
Payer: COMMERCIAL

## 2024-11-22 VITALS
DIASTOLIC BLOOD PRESSURE: 78 MMHG | HEART RATE: 81 BPM | RESPIRATION RATE: 18 BRPM | WEIGHT: 132.8 LBS | BODY MASS INDEX: 26.82 KG/M2 | TEMPERATURE: 98.5 F | OXYGEN SATURATION: 98 % | SYSTOLIC BLOOD PRESSURE: 124 MMHG

## 2024-11-22 DIAGNOSIS — N89.8 VAGINAL IRRITATION: ICD-10-CM

## 2024-11-22 DIAGNOSIS — Z75.8 DOES NOT HAVE PRIMARY CARE PROVIDER: ICD-10-CM

## 2024-11-22 DIAGNOSIS — N89.8 VAGINAL ITCHING: ICD-10-CM

## 2024-11-22 DIAGNOSIS — N89.8 VAGINAL DISCHARGE: Primary | ICD-10-CM

## 2024-11-22 LAB
CONTROL: NORMAL
PREGNANCY TEST URINE, POC: 2

## 2024-11-22 PROCEDURE — 81025 URINE PREGNANCY TEST: CPT

## 2024-11-22 PROCEDURE — G8484 FLU IMMUNIZE NO ADMIN: HCPCS

## 2024-11-22 PROCEDURE — 99213 OFFICE O/P EST LOW 20 MIN: CPT

## 2024-11-22 PROCEDURE — 1036F TOBACCO NON-USER: CPT

## 2024-11-22 PROCEDURE — G8419 CALC BMI OUT NRM PARAM NOF/U: HCPCS

## 2024-11-22 PROCEDURE — G8427 DOCREV CUR MEDS BY ELIG CLIN: HCPCS

## 2024-11-22 RX ORDER — FLUCONAZOLE 150 MG/1
150 TABLET ORAL
Qty: 3 TABLET | Refills: 0 | Status: SHIPPED | OUTPATIENT
Start: 2024-11-22 | End: 2024-12-01

## 2024-11-22 ASSESSMENT — ENCOUNTER SYMPTOMS
SHORTNESS OF BREATH: 0
APNEA: 0
SINUS PAIN: 0
EYE DISCHARGE: 0
COLOR CHANGE: 0
CHOKING: 0
STRIDOR: 0
WHEEZING: 0
NAUSEA: 0
EYE PAIN: 0
EYE REDNESS: 0
CHEST TIGHTNESS: 0
SORE THROAT: 0
CONSTIPATION: 0
VOMITING: 0
COUGH: 0
ABDOMINAL DISTENTION: 0
ABDOMINAL PAIN: 0
DIARRHEA: 0
TROUBLE SWALLOWING: 0
SINUS PRESSURE: 0
FACIAL SWELLING: 0

## 2024-11-22 NOTE — PROGRESS NOTES
[facility]  [facility]  [address] [place] [id number]  Dept: [phone]  Dept Fax: [phone]  Loc: [phone]    Hien Chery is a 24 y.o. female who presents today for her medical conditions/complaints as noted below.  Hien Chery is complaining of Vaginal Itching (Patient states she has been on an antibiotic and has vaginal itch now. )        HPI:   Hien Chery presents to the clinic today with complaints of vaginal itching, irritation, and discharge.  Symptoms started about 3 days ago.  Denies chance of pregnancy or STDs.  Reports history of frequent BV infections.  She says she is currently taking Keflex for treatment of a UTI and is concerned about having a possible yeast infection.  No alleviating factors are reported for this.  Symptoms are moderate.  Patient stable.    Vaginal Itching  The patient's primary symptoms include vaginal discharge. The patient's pertinent negatives include no pelvic pain. Pertinent negatives include no abdominal pain, chills, constipation, diarrhea, dysuria, fever, flank pain, frequency, headaches, hematuria, nausea, rash, sore throat, urgency or vomiting.       Past Medical History:   Diagnosis Date    Genital herpes        Past Surgical History:   Procedure Laterality Date    EXTERNAL EAR SURGERY Left        Family History   Problem Relation Age of Onset    Other Mother         Irregular menses    Diabetes Maternal Grandmother     Cancer Maternal Grandfather        Social History     Tobacco Use    Smoking status: Never    Smokeless tobacco: Never   Substance Use Topics    Alcohol use: No     Alcohol/week: 0.0 standard drinks of alcohol        Current Outpatient Medications   Medication Sig Dispense Refill    fluconazole (DIFLUCAN) 150 MG tablet Take 1 tablet by mouth every 72 hours for 9 days 3 tablet 0    cephALEXin (KEFLEX) 500 MG capsule Take 1 capsule by mouth 4 times daily for 10 days 40 capsule 0

## 2024-11-22 NOTE — PATIENT INSTRUCTIONS
Urine pregnancy test is negative    [facility] testing sent to lab; will call with results    Diflucan prescribed.  Take as directed.    Further treatment pending lab results    Abstain from sexual activity until labs return and treatment is complete.     Increase water intake    Avoid baths or hot tubs    The patient is to follow up with PCP/GYN or return to clinic if symptoms worsen/fail to improve.    Any condition can change, despite proper treatment. Therefore, if symptoms still persist or worsen after treatment plan intitated today, either go to the nearest ER, or call PCP, or return to UC for further evaluation.    Urgent Care evaluation today is not a substitute for PCP visit. Follow up care is your responsibility to discuss and review this UC visit.

## 2024-11-25 ENCOUNTER — CARE COORDINATION (OUTPATIENT)
Dept: OTHER | Facility: CLINIC | Age: 24
End: 2024-11-25

## 2024-11-25 RX ORDER — METRONIDAZOLE 500 MG/1
500 TABLET ORAL 2 TIMES DAILY
Qty: 14 TABLET | Refills: 0 | Status: SHIPPED | OUTPATIENT
Start: 2024-11-25 | End: 2024-12-02

## 2024-11-25 RX ORDER — DOXYCYCLINE HYCLATE 100 MG
100 TABLET ORAL 2 TIMES DAILY
Qty: 20 TABLET | Refills: 0 | Status: SHIPPED | OUTPATIENT
Start: 2024-11-25 | End: 2024-12-05

## 2024-11-25 NOTE — CARE COORDINATION
Ambulatory Care Coordination Note     11/25/2024 2:14 PM     patient outreach attempt by this ACM today to offer care management services. ACM was unable to reach the patient by telephone today;   left voice message requesting a return phone call to this ACM.  mychart message sent requesting patient to contact this ACM.     Patient closed (unable to reach patient) from the High Risk Care Management program on 11/25/2024.  No further Ambulatory Care Manager follow up scheduled.

## 2024-12-03 ENCOUNTER — TELEPHONE (OUTPATIENT)
Dept: PRIMARY CARE CLINIC | Age: 24
End: 2024-12-03

## 2024-12-03 ENCOUNTER — OFFICE VISIT (OUTPATIENT)
Dept: PRIMARY CARE CLINIC | Age: 24
End: 2024-12-03
Payer: COMMERCIAL

## 2024-12-03 VITALS
OXYGEN SATURATION: 99 % | BODY MASS INDEX: 26.57 KG/M2 | TEMPERATURE: 96.9 F | SYSTOLIC BLOOD PRESSURE: 116 MMHG | WEIGHT: 131.8 LBS | HEIGHT: 59 IN | HEART RATE: 64 BPM | DIASTOLIC BLOOD PRESSURE: 68 MMHG

## 2024-12-03 DIAGNOSIS — Z00.00 ENCOUNTER FOR MEDICAL EXAMINATION TO ESTABLISH CARE: Primary | ICD-10-CM

## 2024-12-03 DIAGNOSIS — N76.0 BACTERIAL VAGINOSIS: ICD-10-CM

## 2024-12-03 DIAGNOSIS — B96.89 BACTERIAL VAGINOSIS: ICD-10-CM

## 2024-12-03 PROCEDURE — 99385 PREV VISIT NEW AGE 18-39: CPT | Performed by: NURSE PRACTITIONER

## 2024-12-03 PROCEDURE — G8484 FLU IMMUNIZE NO ADMIN: HCPCS | Performed by: NURSE PRACTITIONER

## 2024-12-03 SDOH — HEALTH STABILITY: PHYSICAL HEALTH: ON AVERAGE, HOW MANY DAYS PER WEEK DO YOU ENGAGE IN MODERATE TO STRENUOUS EXERCISE (LIKE A BRISK WALK)?: 4 DAYS

## 2024-12-03 NOTE — TELEPHONE ENCOUNTER
lvm to schedule follow up appointment per check out notes.    Return in about 6 months (around 6/3/2025).

## 2024-12-03 NOTE — PROGRESS NOTES
Ms.Elayah Chery is a 24 y.o. female who presents today for  Chief Complaint   Patient presents with    Establish Care       HPI:  ***    Review of Systems    Past Medical History:   Diagnosis Date    Genital herpes        Current Outpatient Medications   Medication Sig Dispense Refill    doxycycline hyclate (VIBRA-TABS) 100 MG tablet Take 1 tablet by mouth 2 times daily for 10 days 20 tablet 0    ondansetron (ZOFRAN-ODT) 4 MG disintegrating tablet Take 1 tablet by mouth 3 times daily as needed for Nausea or Vomiting 21 tablet 0     No current facility-administered medications for this visit.       No Known Allergies    Past Surgical History:   Procedure Laterality Date    EXTERNAL EAR SURGERY Left        Social History     Tobacco Use    Smoking status: Never    Smokeless tobacco: Never   Vaping Use    Vaping status: Former   Substance Use Topics    Alcohol use: No     Alcohol/week: 0.0 standard drinks of alcohol    Drug use: No       Family History   Problem Relation Age of Onset    Other Mother         Irregular menses    Diabetes Maternal Grandmother     Cancer Maternal Grandfather        /68 (Site: Left Upper Arm, Position: Sitting, Cuff Size: Medium Adult)   Pulse 64   Temp 96.9 °F (36.1 °C) (Temporal)   Ht 1.499 m (4' 11\")   Wt 59.8 kg (131 lb 12.8 oz)   LMP 11/25/2024   SpO2 99%   BMI 26.62 kg/m²     Physical Exam    ASSESSMENT/PLAN:  There are no diagnoses linked to this encounter.       No follow-ups on file.    Patient offered educational handouts and has had all questions answered.  Patient voices understanding and agrees to plans along with risks and benefits of plan. Patient is instructed to continue prior meds, diet, and exercise plans as instructed. Patient agrees to follow up as instructed and sooner if needed.  Patient agrees to go to ER if condition becomes emergent.      EMR Dragon/transcription disclaimer: Some of this encounter note is an electronic transcription/translation of 
Cancer Maternal Grandfather        /68 (Site: Left Upper Arm, Position: Sitting, Cuff Size: Medium Adult)   Pulse 64   Temp 96.9 °F (36.1 °C) (Temporal)   Ht 1.499 m (4' 11\")   Wt 59.8 kg (131 lb 12.8 oz)   LMP 11/25/2024   SpO2 99%   BMI 26.62 kg/m²     Physical Exam  Constitutional:       Appearance: Normal appearance.   HENT:      Head: Normocephalic.      Right Ear: Tympanic membrane normal.      Left Ear: Tympanic membrane normal.      Nose: Nose normal.      Mouth/Throat:      Mouth: Mucous membranes are moist.      Pharynx: Oropharynx is clear.   Eyes:      Extraocular Movements: Extraocular movements intact.      Pupils: Pupils are equal, round, and reactive to light.   Cardiovascular:      Rate and Rhythm: Normal rate and regular rhythm.      Pulses: Normal pulses.      Heart sounds: Normal heart sounds.   Pulmonary:      Effort: Pulmonary effort is normal.      Breath sounds: Normal breath sounds.   Abdominal:      General: Bowel sounds are normal.      Palpations: Abdomen is soft.   Musculoskeletal:         General: Normal range of motion.      Cervical back: Normal range of motion.   Skin:     General: Skin is warm and dry.   Neurological:      Mental Status: She is alert and oriented to person, place, and time.   Psychiatric:         Mood and Affect: Mood normal.         Behavior: Behavior normal.         ASSESSMENT/PLAN:  1. Encounter for medical examination to establish care  Physical exam without any abnormal findings.  Do not feel that labs are indicated at this time.    2. Bacterial vaginosis  Patient to continue to take and complete the prescribed oral antibiotics.  Advised over-the-counter boric acid as well as a daily probiotic.  Patient to call back or return to clinic with any worsening symptoms or if not improved.         Return in about 6 months (around 6/3/2025).    Patient offered educational handouts and has had all questions answered.  Patient voices understanding and agrees

## 2024-12-06 ASSESSMENT — ENCOUNTER SYMPTOMS
NAUSEA: 0
VOMITING: 0
COUGH: 0
SHORTNESS OF BREATH: 0
SORE THROAT: 0
ABDOMINAL PAIN: 0
DIARRHEA: 0
COLOR CHANGE: 0
CHEST TIGHTNESS: 0

## 2024-12-10 ENCOUNTER — TELEPHONE (OUTPATIENT)
Dept: OBGYN CLINIC | Age: 24
End: 2024-12-10

## 2024-12-10 NOTE — TELEPHONE ENCOUNTER
Pt no showed for appt on 12/10/24, called pt to reschedule and lvm. Will send MyChart message as well.

## 2024-12-12 LAB — ECHO BSA: 1.6 M2

## 2024-12-30 ENCOUNTER — TELEPHONE (OUTPATIENT)
Dept: OBGYN CLINIC | Age: 24
End: 2024-12-30

## 2025-01-08 ENCOUNTER — TELEPHONE (OUTPATIENT)
Dept: OBGYN CLINIC | Age: 25
End: 2025-01-08

## 2025-01-08 NOTE — TELEPHONE ENCOUNTER
Patient called and requested an appointment before Friday for pregnancy confirmation due to weather. Please return patient's call.

## 2025-01-14 ENCOUNTER — OFFICE VISIT (OUTPATIENT)
Age: 25
End: 2025-01-14
Payer: COMMERCIAL

## 2025-01-14 ENCOUNTER — TELEPHONE (OUTPATIENT)
Dept: OBGYN CLINIC | Age: 25
End: 2025-01-14

## 2025-01-14 VITALS
BODY MASS INDEX: 25.4 KG/M2 | WEIGHT: 126 LBS | HEART RATE: 96 BPM | DIASTOLIC BLOOD PRESSURE: 60 MMHG | TEMPERATURE: 97.8 F | RESPIRATION RATE: 20 BRPM | SYSTOLIC BLOOD PRESSURE: 106 MMHG | OXYGEN SATURATION: 99 % | HEIGHT: 59 IN

## 2025-01-14 DIAGNOSIS — Z32.01 PREGNANCY CONFIRMED BY POSITIVE URINE TEST: ICD-10-CM

## 2025-01-14 DIAGNOSIS — Z32.00 ENCOUNTER FOR PREGNANCY TEST, RESULT UNKNOWN: Primary | ICD-10-CM

## 2025-01-14 LAB
CONTROL: PRESENT
GONADOTROPIN, CHORIONIC (HCG) QUANT: ABNORMAL MIU/ML (ref 0–5.3)
PREGNANCY TEST URINE, POC: ABNORMAL

## 2025-01-14 PROCEDURE — 1036F TOBACCO NON-USER: CPT

## 2025-01-14 PROCEDURE — 99213 OFFICE O/P EST LOW 20 MIN: CPT

## 2025-01-14 PROCEDURE — G8427 DOCREV CUR MEDS BY ELIG CLIN: HCPCS

## 2025-01-14 PROCEDURE — 81025 URINE PREGNANCY TEST: CPT

## 2025-01-14 PROCEDURE — G8419 CALC BMI OUT NRM PARAM NOF/U: HCPCS

## 2025-01-14 RX ORDER — MULTIVITAMIN WITH IRON
1 TABLET ORAL DAILY
COMMUNITY

## 2025-01-14 ASSESSMENT — ENCOUNTER SYMPTOMS
RHINORRHEA: 0
ABDOMINAL DISTENTION: 0
EYE DISCHARGE: 0
SORE THROAT: 0
NAUSEA: 0
CONSTIPATION: 0
COLOR CHANGE: 0
VOMITING: 0
SHORTNESS OF BREATH: 0
ABDOMINAL PAIN: 0
WHEEZING: 0
COUGH: 0
EYE PAIN: 0
APNEA: 0
SINUS PRESSURE: 0
TROUBLE SWALLOWING: 0
SINUS PAIN: 0
CHEST TIGHTNESS: 0
DIARRHEA: 0
EYE ITCHING: 0

## 2025-01-14 NOTE — PROGRESS NOTES
[facility]  [facility]  [address] [place] [address] [place] [address]  Dept: [phone]  Dept Fax: [phone]  Loc: [phone]    Hien Chery is a 24 y.o. female who presents today for her medical conditions/complaints as noted below.  Hien Chery is complaining of Amenorrhea (Pt reports positive at home test, pt reports period is 2 weeks late. )      HPI:   HPI    Patient presents requesting a urine pregnancy test. She reports she had a positive pregnancy test at home 5 days ago. Reports her menstrual period is 2 weeks late. Has not started prenatals yet. Is established with an OBGYN. Denies any symptoms at this time. Would like hCG quantitative lab.     Past Medical History:   Diagnosis Date    Genital herpes        Past Surgical History:   Procedure Laterality Date    EXTERNAL EAR SURGERY Left        Family History   Problem Relation Age of Onset    Other Mother         Irregular menses    Diabetes Maternal Grandmother     Cancer Maternal Grandfather        Social History     Tobacco Use    Smoking status: Never    Smokeless tobacco: Never   Substance Use Topics    Alcohol use: Yes     Comment: Occ        Current Outpatient Medications   Medication Sig Dispense Refill    Multiple Vitamin (MULTIVITAMIN) TABS tablet Take 1 tablet by mouth daily      ondansetron (ZOFRAN-ODT) 4 MG disintegrating tablet Take 1 tablet by mouth 3 times daily as needed for Nausea or Vomiting (Patient not taking: Reported on 1/14/2025) 21 tablet 0     No current facility-administered medications for this visit.       No Known Allergies    Health Maintenance   Topic Date Due    Chlamydia/GC screen  07/31/2024    Flu vaccine (1) 08/01/2024    COVID-19 Vaccine (1 - 2023-24 season) Never done    Depression Screen  11/18/2025    Pap smear  03/27/2026    DTaP/Tdap/Td vaccine (8 - Td or Tdap) 02/26/2029    Respiratory Syncytial Virus (RSV) Pregnant or age 60 yrs+ (1 - 1-dose 75+ series)

## 2025-01-14 NOTE — TELEPHONE ENCOUNTER
Pt called to inform office that she already had pregnancy confirmed. Pt is still wanting to keep appt for Friday. Appt need to be changed to prenatal visit. Please advise.

## 2025-01-14 NOTE — PATIENT INSTRUCTIONS
Urine pregnancy test positive.   hCG quantitative lab ordered. We will call with these results.   Schedule appointment with OBGYN.   Start prenatals.

## 2025-01-15 ENCOUNTER — TELEPHONE (OUTPATIENT)
Dept: OBGYN CLINIC | Age: 25
End: 2025-01-15

## 2025-01-15 DIAGNOSIS — Z36.89 CONFIRM FETAL CARDIAC ACTIVITY USING ULTRASOUND: Primary | ICD-10-CM

## 2025-01-15 NOTE — TELEPHONE ENCOUNTER
Patient was already scheduled for a pregnancy confirmation on 1/10/2025. Patient called and stated she was confirmed at Urgent Care on 1/14/2025. Informed patient that we will have to get her scheduled for an u/s. But she still can come to her appointment on Friday to see malik for pregnancy confirmation.Changed visit type to new ob.

## 2025-01-15 NOTE — TELEPHONE ENCOUNTER
Patient calling the office back she had her Pregnancy Confirmation on 1-14-25 at Urgent care at [facility].      Thank you

## 2025-01-15 NOTE — TELEPHONE ENCOUNTER
Patient said she was confirmed at a facility I called and lv for to call back so I can put where she was confirmed and change the appointment.

## 2025-01-17 ENCOUNTER — INITIAL PRENATAL (OUTPATIENT)
Dept: OBGYN CLINIC | Age: 25
End: 2025-01-17

## 2025-01-17 VITALS
SYSTOLIC BLOOD PRESSURE: 123 MMHG | BODY MASS INDEX: 25.04 KG/M2 | HEART RATE: 69 BPM | DIASTOLIC BLOOD PRESSURE: 77 MMHG | WEIGHT: 124 LBS

## 2025-01-17 DIAGNOSIS — Z98.891 HISTORY OF CESAREAN DELIVERY: ICD-10-CM

## 2025-01-17 DIAGNOSIS — Z34.81 ENCOUNTER FOR SUPERVISION OF OTHER NORMAL PREGNANCY IN FIRST TRIMESTER: Primary | ICD-10-CM

## 2025-01-17 DIAGNOSIS — Z3A.01 7 WEEKS GESTATION OF PREGNANCY: ICD-10-CM

## 2025-01-17 DIAGNOSIS — Z87.59 HISTORY OF PRE-ECLAMPSIA: ICD-10-CM

## 2025-01-17 DIAGNOSIS — R11.0 NAUSEA: ICD-10-CM

## 2025-01-17 DIAGNOSIS — Z34.81 ENCOUNTER FOR SUPERVISION OF OTHER NORMAL PREGNANCY IN FIRST TRIMESTER: ICD-10-CM

## 2025-01-17 PROBLEM — R03.0 BLOOD PRESSURE ELEVATED WITHOUT HISTORY OF HTN: Status: RESOLVED | Noted: 2023-12-11 | Resolved: 2025-01-17

## 2025-01-17 PROBLEM — Z34.00 SUPERVISION OF NORMAL FIRST PREGNANCY, ANTEPARTUM: Status: RESOLVED | Noted: 2023-09-11 | Resolved: 2025-01-17

## 2025-01-17 PROBLEM — O13.2 GESTATIONAL HYPERTENSION, SECOND TRIMESTER: Status: RESOLVED | Noted: 2023-12-30 | Resolved: 2025-01-17

## 2025-01-17 PROBLEM — Z3A.31 31 WEEKS GESTATION OF PREGNANCY: Status: RESOLVED | Noted: 2024-01-16 | Resolved: 2025-01-17

## 2025-01-17 LAB
ABO/RH: NORMAL
ANTIBODY SCREEN: NORMAL
HCV AB SERPL QL IA: NORMAL

## 2025-01-17 RX ORDER — HYDROXYZINE PAMOATE 25 MG/1
25 CAPSULE ORAL 3 TIMES DAILY PRN
Qty: 90 CAPSULE | Refills: 0 | Status: SHIPPED | OUTPATIENT
Start: 2025-01-17 | End: 2025-02-16

## 2025-01-17 RX ORDER — PNV NO.95/FERROUS FUM/FOLIC AC 28MG-0.8MG
1 TABLET ORAL DAILY
Qty: 30 TABLET | Refills: 11 | Status: SHIPPED | OUTPATIENT
Start: 2025-01-17

## 2025-01-17 RX ORDER — ONDANSETRON 4 MG/1
4 TABLET, ORALLY DISINTEGRATING ORAL 3 TIMES DAILY PRN
Qty: 21 TABLET | Refills: 3 | Status: SHIPPED | OUTPATIENT
Start: 2025-01-17

## 2025-01-17 RX ORDER — ASPIRIN 81 MG/1
81 TABLET, CHEWABLE ORAL DAILY
Qty: 30 TABLET | Refills: 11 | Status: SHIPPED | OUTPATIENT
Start: 2025-01-17

## 2025-01-17 NOTE — PATIENT INSTRUCTIONS
Patient Education        Weeks 6 to 10 of Your Pregnancy: Care Instructions  During these weeks of pregnancy, your body goes through many changes. You may start to feel different, both in your body and your emotions. Each pregnancy is different, so there's no \"right\" way to feel. These early weeks are a time to make healthy choices for you and your pregnancy.    Take a daily prenatal vitamin. Choose one with folic acid in it.   Avoid alcohol, tobacco, and drugs (including marijuana). If you need help quitting, talk to your doctor.     Drink plenty of liquids.  Be sure to drink enough water. And limit sodas, other sweetened drinks, and caffeine.     Choose foods that are good sources of calcium, iron, and folate.  You can try dairy products, dark leafy greens, fortified orange juice and cereals, almonds, broccoli, dried fruit, and beans.     Avoid foods that may be harmful.  Don't eat raw meat, deli meat, raw seafood, or raw eggs. Avoid soft cheese and unpasteurized dairy, like Brie and blue cheese. And don't eat fish that contains a lot of mercury, like shark and swordfish.     Don't touch obdulio litter or cat poop.  They can cause an infection that could be harmful during pregnancy.     Avoid things that can make your body too hot.  For example, avoid hot tubs and saunas.     Soothe morning sickness.  Try eating 5 or 6 small meals a day, getting some fresh air, or using michael to control symptoms.     Ask your doctor about flu and COVID-19 shots.  Getting them can help protect against infection.   Follow-up care is a key part of your treatment and safety. Be sure to make and go to all appointments, and call your doctor if you are having problems. It's also a good idea to know your test results and keep a list of the medicines you take.  Where can you learn more?  Go to https://www.healthwise.net/patientEd and enter G112 to learn more about \"Weeks 6 to 10 of Your Pregnancy: Care Instructions.\"  Current as of: April

## 2025-01-17 NOTE — PROGRESS NOTES
Pt presents today for routine prenatal visit. Pt denies vaginal bleeding, cramping, or leaking of fluid. She was seen at Urgent Care on 1/14/2025 and her quant was 03481.0. Their is already and order for an us in chart. She is wanting to see Dr Whitney for delivery.     
tablet     Sig: Take 1 tablet by mouth 3 times daily as needed for Nausea or Vomiting     Dispense:  21 tablet     Refill:  3    aspirin (ASPIRIN CHILDRENS) 81 MG chewable tablet     Sig: Take 1 tablet by mouth daily     Dispense:  30 tablet     Refill:  11    hydrOXYzine pamoate (VISTARIL) 25 MG capsule     Sig: Take 1 capsule by mouth 3 times daily as needed for Anxiety     Dispense:  90 capsule     Refill:  0       ORDERS:  Orders Placed This Encounter   Procedures    Culture, Urine    Chlamydia/N. Gonorrhoeae/T.Vaginalis    Varicella Zoster Antibody, IgG    HIV Obstetric Panel    Hepatitis C Antibody

## 2025-01-19 LAB — BACTERIA UR CULT: NORMAL

## 2025-01-20 LAB
BASOPHILS # BLD: 0 K/UL (ref 0–0.2)
BASOPHILS NFR BLD: 0.4 % (ref 0–1)
EOSINOPHIL # BLD: 0 K/UL (ref 0–0.6)
EOSINOPHIL NFR BLD: 0.4 % (ref 0–5)
ERYTHROCYTE [DISTWIDTH] IN BLOOD BY AUTOMATED COUNT: 12.5 % (ref 11.5–14.5)
HBV SURFACE AG SERPL QL IA: ABNORMAL
HCT VFR BLD AUTO: 39.6 % (ref 37–47)
HGB BLD-MCNC: 13 G/DL (ref 12–16)
HIV-1 P24 AG: ABNORMAL
HIV1+2 AB SERPLBLD QL IA.RAPID: ABNORMAL
IMM GRANULOCYTES # BLD: 0 K/UL
LYMPHOCYTES # BLD: 2.9 K/UL (ref 1.1–4.5)
LYMPHOCYTES NFR BLD: 28 % (ref 20–40)
MCH RBC QN AUTO: 28.3 PG (ref 27–31)
MCHC RBC AUTO-ENTMCNC: 32.8 G/DL (ref 33–37)
MCV RBC AUTO: 86.3 FL (ref 81–99)
MONOCYTES # BLD: 0.6 K/UL (ref 0–0.9)
MONOCYTES NFR BLD: 5.5 % (ref 0–10)
NEUTROPHILS # BLD: 6.9 K/UL (ref 1.5–7.5)
NEUTS SEG NFR BLD: 65.4 % (ref 50–65)
PLATELET # BLD AUTO: 272 K/UL (ref 130–400)
PMV BLD AUTO: 11.1 FL (ref 9.4–12.3)
RBC # BLD AUTO: 4.59 M/UL (ref 4.2–5.4)
RPR SER QL: ABNORMAL
RUBV IGG SER-ACNC: REACTIVE [IU]/ML
VZV IGG SER IA-ACNC: 23 S/CO
WBC # BLD AUTO: 10.5 K/UL (ref 4.8–10.8)

## 2025-01-21 ENCOUNTER — OFFICE VISIT (OUTPATIENT)
Dept: PRIMARY CARE CLINIC | Age: 25
End: 2025-01-21
Payer: MEDICAID

## 2025-01-21 VITALS
OXYGEN SATURATION: 99 % | HEIGHT: 59 IN | HEART RATE: 69 BPM | WEIGHT: 125.8 LBS | DIASTOLIC BLOOD PRESSURE: 78 MMHG | TEMPERATURE: 97.1 F | BODY MASS INDEX: 25.36 KG/M2 | SYSTOLIC BLOOD PRESSURE: 124 MMHG

## 2025-01-21 DIAGNOSIS — N89.8 VAGINAL DISCHARGE: Primary | ICD-10-CM

## 2025-01-21 PROCEDURE — G8427 DOCREV CUR MEDS BY ELIG CLIN: HCPCS | Performed by: NURSE PRACTITIONER

## 2025-01-21 PROCEDURE — G8419 CALC BMI OUT NRM PARAM NOF/U: HCPCS | Performed by: NURSE PRACTITIONER

## 2025-01-21 PROCEDURE — 1036F TOBACCO NON-USER: CPT | Performed by: NURSE PRACTITIONER

## 2025-01-21 PROCEDURE — 99213 OFFICE O/P EST LOW 20 MIN: CPT | Performed by: NURSE PRACTITIONER

## 2025-01-21 SDOH — ECONOMIC STABILITY: FOOD INSECURITY: WITHIN THE PAST 12 MONTHS, THE FOOD YOU BOUGHT JUST DIDN'T LAST AND YOU DIDN'T HAVE MONEY TO GET MORE.: NEVER TRUE

## 2025-01-21 SDOH — ECONOMIC STABILITY: FOOD INSECURITY: WITHIN THE PAST 12 MONTHS, YOU WORRIED THAT YOUR FOOD WOULD RUN OUT BEFORE YOU GOT MONEY TO BUY MORE.: NEVER TRUE

## 2025-01-21 ASSESSMENT — PATIENT HEALTH QUESTIONNAIRE - PHQ9
2. FEELING DOWN, DEPRESSED OR HOPELESS: NOT AT ALL
SUM OF ALL RESPONSES TO PHQ9 QUESTIONS 1 & 2: 0
SUM OF ALL RESPONSES TO PHQ QUESTIONS 1-9: 0
SUM OF ALL RESPONSES TO PHQ QUESTIONS 1-9: 0
1. LITTLE INTEREST OR PLEASURE IN DOING THINGS: NOT AT ALL
SUM OF ALL RESPONSES TO PHQ QUESTIONS 1-9: 0
SUM OF ALL RESPONSES TO PHQ QUESTIONS 1-9: 0

## 2025-01-21 ASSESSMENT — ENCOUNTER SYMPTOMS
NAUSEA: 0
CHEST TIGHTNESS: 0
DIARRHEA: 0
COLOR CHANGE: 0
VOMITING: 0
COUGH: 0
ABDOMINAL PAIN: 0
SHORTNESS OF BREATH: 0
SORE THROAT: 0

## 2025-01-21 NOTE — PROGRESS NOTES
Ms.Elayah Chery is a 24 y.o. female who presents today for  Chief Complaint   Patient presents with    Vaginal Odor    Vaginal Discharge       HPI:  Patient here today with concerns of vaginal odor and increased cloudy discharge for the past week. She denies any associated abdominal pain or urinary symptoms. She does admit that she is currently 8 weeks pregnant.     Review of Systems   Constitutional:  Negative for activity change and fever.   HENT:  Negative for congestion, ear pain and sore throat.    Respiratory:  Negative for cough, chest tightness and shortness of breath.    Cardiovascular:  Negative for chest pain.   Gastrointestinal:  Negative for abdominal pain, diarrhea, nausea and vomiting.   Genitourinary:  Positive for vaginal discharge. Negative for frequency and urgency.   Musculoskeletal:  Negative for arthralgias and myalgias.   Skin:  Negative for color change.   Neurological:  Negative for dizziness, weakness and numbness.   Psychiatric/Behavioral:  Negative for agitation. The patient is not nervous/anxious.        Past Medical History:   Diagnosis Date    Genital herpes        Current Outpatient Medications   Medication Sig Dispense Refill    Prenatal Vit-Fe Fumarate-FA (PRENATAL VITAMIN) 27-0.8 MG TABS Take 1 tablet by mouth daily 30 tablet 11    ondansetron (ZOFRAN-ODT) 4 MG disintegrating tablet Take 1 tablet by mouth 3 times daily as needed for Nausea or Vomiting 21 tablet 3    aspirin (ASPIRIN CHILDRENS) 81 MG chewable tablet Take 1 tablet by mouth daily 30 tablet 11    hydrOXYzine pamoate (VISTARIL) 25 MG capsule Take 1 capsule by mouth 3 times daily as needed for Anxiety 90 capsule 0    Multiple Vitamin (MULTIVITAMIN) TABS tablet Take 1 tablet by mouth daily      ondansetron (ZOFRAN-ODT) 4 MG disintegrating tablet Take 1 tablet by mouth 3 times daily as needed for Nausea or Vomiting 21 tablet 0     No current facility-administered medications for this visit.       No Known

## 2025-01-22 DIAGNOSIS — N76.0 BACTERIAL VAGINOSIS: Primary | ICD-10-CM

## 2025-01-22 DIAGNOSIS — B96.89 BACTERIAL VAGINOSIS: Primary | ICD-10-CM

## 2025-01-22 RX ORDER — AZITHROMYCIN 250 MG/1
TABLET, FILM COATED ORAL
Qty: 6 TABLET | Refills: 0 | Status: SHIPPED | OUTPATIENT
Start: 2025-01-22 | End: 2025-02-01

## 2025-01-22 RX ORDER — METRONIDAZOLE 500 MG/1
500 TABLET ORAL 2 TIMES DAILY
Qty: 14 TABLET | Refills: 0 | Status: SHIPPED | OUTPATIENT
Start: 2025-01-22 | End: 2025-01-29

## 2025-01-28 ENCOUNTER — TELEPHONE (OUTPATIENT)
Dept: OBGYN CLINIC | Age: 25
End: 2025-01-28

## 2025-01-28 RX ORDER — METRONIDAZOLE 7.5 MG/G
GEL VAGINAL
Qty: 70 G | Refills: 0 | Status: SHIPPED | OUTPATIENT
Start: 2025-01-28 | End: 2025-02-04

## 2025-02-05 ENCOUNTER — HOSPITAL ENCOUNTER (OUTPATIENT)
Dept: ULTRASOUND IMAGING | Age: 25
Discharge: HOME | End: 2025-02-05
Payer: MEDICAID

## 2025-02-05 DIAGNOSIS — Z36.89 CONFIRM FETAL CARDIAC ACTIVITY USING ULTRASOUND: ICD-10-CM

## 2025-02-05 PROCEDURE — 76801 OB US < 14 WKS SINGLE FETUS: CPT

## 2025-02-12 ENCOUNTER — PATIENT MESSAGE (OUTPATIENT)
Dept: OBGYN CLINIC | Age: 25
End: 2025-02-12

## 2025-02-16 ENCOUNTER — APPOINTMENT (OUTPATIENT)
Dept: ULTRASOUND IMAGING | Facility: HOSPITAL | Age: 25
End: 2025-02-16
Payer: COMMERCIAL

## 2025-02-16 ENCOUNTER — HOSPITAL ENCOUNTER (EMERGENCY)
Facility: HOSPITAL | Age: 25
Discharge: HOME | End: 2025-02-16
Attending: EMERGENCY MEDICINE | Admitting: EMERGENCY MEDICINE
Payer: COMMERCIAL

## 2025-02-16 VITALS
HEIGHT: 59 IN | DIASTOLIC BLOOD PRESSURE: 85 MMHG | RESPIRATION RATE: 16 BRPM | BODY MASS INDEX: 25 KG/M2 | TEMPERATURE: 98.1 F | SYSTOLIC BLOOD PRESSURE: 128 MMHG | WEIGHT: 124 LBS | HEART RATE: 95 BPM | OXYGEN SATURATION: 99 %

## 2025-02-16 DIAGNOSIS — O03.9 EARLY PREGNANCY LOSS: Primary | ICD-10-CM

## 2025-02-16 DIAGNOSIS — N39.0 ACUTE UTI (URINARY TRACT INFECTION): ICD-10-CM

## 2025-02-16 DIAGNOSIS — R10.32 BILATERAL LOWER ABDOMINAL CRAMPING: ICD-10-CM

## 2025-02-16 DIAGNOSIS — R10.31 BILATERAL LOWER ABDOMINAL CRAMPING: ICD-10-CM

## 2025-02-16 LAB
ABO GROUP BLD: NORMAL
ALBUMIN SERPL-MCNC: 4.1 G/DL (ref 3.5–5.2)
ALBUMIN/GLOB SERPL: 1.3 G/DL
ALP SERPL-CCNC: 81 U/L (ref 39–117)
ALT SERPL W P-5'-P-CCNC: 7 U/L (ref 1–33)
ANION GAP SERPL CALCULATED.3IONS-SCNC: 12 MMOL/L (ref 5–15)
AST SERPL-CCNC: 14 U/L (ref 1–32)
BACTERIA UR QL AUTO: ABNORMAL /HPF
BASOPHILS # BLD AUTO: 0.05 10*3/MM3 (ref 0–0.2)
BASOPHILS NFR BLD AUTO: 0.2 % (ref 0–1.5)
BILIRUB SERPL-MCNC: 0.3 MG/DL (ref 0–1.2)
BILIRUB UR QL STRIP: ABNORMAL
BLD GP AB SCN SERPL QL: NEGATIVE
BUN SERPL-MCNC: 4 MG/DL (ref 6–20)
BUN/CREAT SERPL: 7.8 (ref 7–25)
CALCIUM SPEC-SCNC: 9 MG/DL (ref 8.6–10.5)
CHLORIDE SERPL-SCNC: 97 MMOL/L (ref 98–107)
CLARITY UR: ABNORMAL
CLUE CELLS SPEC QL WET PREP: ABNORMAL
CO2 SERPL-SCNC: 24 MMOL/L (ref 22–29)
COLOR UR: ABNORMAL
CREAT SERPL-MCNC: 0.51 MG/DL (ref 0.57–1)
D-LACTATE SERPL-SCNC: 1.1 MMOL/L (ref 0.5–2)
DEPRECATED RDW RBC AUTO: 36.7 FL (ref 37–54)
EGFRCR SERPLBLD CKD-EPI 2021: 134.7 ML/MIN/1.73
EOSINOPHIL # BLD AUTO: 0.04 10*3/MM3 (ref 0–0.4)
EOSINOPHIL NFR BLD AUTO: 0.2 % (ref 0.3–6.2)
ERYTHROCYTE [DISTWIDTH] IN BLOOD BY AUTOMATED COUNT: 12.1 % (ref 12.3–15.4)
GLOBULIN UR ELPH-MCNC: 3.2 GM/DL
GLUCOSE SERPL-MCNC: 117 MG/DL (ref 65–99)
GLUCOSE UR STRIP-MCNC: ABNORMAL MG/DL
HCG INTACT+B SERPL-ACNC: NORMAL MIU/ML
HCT VFR BLD AUTO: 38.2 % (ref 34–46.6)
HGB BLD-MCNC: 12.6 G/DL (ref 12–15.9)
HGB UR QL STRIP.AUTO: ABNORMAL
HYALINE CASTS UR QL AUTO: ABNORMAL /LPF
HYDATID CYST SPEC WET PREP: ABNORMAL
IMM GRANULOCYTES # BLD AUTO: 0.1 10*3/MM3 (ref 0–0.05)
IMM GRANULOCYTES NFR BLD AUTO: 0.5 % (ref 0–0.5)
KETONES UR QL STRIP: ABNORMAL
LEUKOCYTE ESTERASE UR QL STRIP.AUTO: ABNORMAL
LYMPHOCYTES # BLD AUTO: 2.38 10*3/MM3 (ref 0.7–3.1)
LYMPHOCYTES NFR BLD AUTO: 11.6 % (ref 19.6–45.3)
MCH RBC QN AUTO: 27.2 PG (ref 26.6–33)
MCHC RBC AUTO-ENTMCNC: 33 G/DL (ref 31.5–35.7)
MCV RBC AUTO: 82.5 FL (ref 79–97)
MONOCYTES # BLD AUTO: 0.8 10*3/MM3 (ref 0.1–0.9)
MONOCYTES NFR BLD AUTO: 3.9 % (ref 5–12)
NEUTROPHILS NFR BLD AUTO: 17.13 10*3/MM3 (ref 1.7–7)
NEUTROPHILS NFR BLD AUTO: 83.6 % (ref 42.7–76)
NITRITE UR QL STRIP: POSITIVE
NRBC BLD AUTO-RTO: 0 /100 WBC (ref 0–0.2)
NUMBER OF DOSES: NORMAL
PH UR STRIP.AUTO: 8.5 [PH] (ref 5–8)
PLATELET # BLD AUTO: 265 10*3/MM3 (ref 140–450)
PMV BLD AUTO: 10.3 FL (ref 6–12)
POTASSIUM SERPL-SCNC: 3.7 MMOL/L (ref 3.5–5.2)
PROT SERPL-MCNC: 7.3 G/DL (ref 6–8.5)
PROT UR QL STRIP: ABNORMAL
RBC # BLD AUTO: 4.63 10*6/MM3 (ref 3.77–5.28)
RBC # UR STRIP: ABNORMAL /HPF
REF LAB TEST METHOD: ABNORMAL
RH BLD: POSITIVE
SODIUM SERPL-SCNC: 133 MMOL/L (ref 136–145)
SP GR UR STRIP: 1.01 (ref 1–1.03)
SQUAMOUS #/AREA URNS HPF: ABNORMAL /HPF
T VAGINALIS SPEC QL WET PREP: ABNORMAL
UROBILINOGEN UR QL STRIP: ABNORMAL
WBC # UR STRIP: ABNORMAL /HPF
WBC NRBC COR # BLD AUTO: 20.5 10*3/MM3 (ref 3.4–10.8)
WBC SPEC QL WET PREP: ABNORMAL
YEAST GENITAL QL WET PREP: ABNORMAL

## 2025-02-16 PROCEDURE — 86900 BLOOD TYPING SEROLOGIC ABO: CPT | Performed by: NURSE PRACTITIONER

## 2025-02-16 PROCEDURE — 76801 OB US < 14 WKS SINGLE FETUS: CPT

## 2025-02-16 PROCEDURE — 87210 SMEAR WET MOUNT SALINE/INK: CPT | Performed by: NURSE PRACTITIONER

## 2025-02-16 PROCEDURE — 83605 ASSAY OF LACTIC ACID: CPT | Performed by: NURSE PRACTITIONER

## 2025-02-16 PROCEDURE — 96372 THER/PROPH/DIAG INJ SC/IM: CPT

## 2025-02-16 PROCEDURE — 87040 BLOOD CULTURE FOR BACTERIA: CPT | Performed by: NURSE PRACTITIONER

## 2025-02-16 PROCEDURE — 85025 COMPLETE CBC W/AUTO DIFF WBC: CPT | Performed by: NURSE PRACTITIONER

## 2025-02-16 PROCEDURE — 25010000002 CEFTRIAXONE PER 250 MG: Performed by: NURSE PRACTITIONER

## 2025-02-16 PROCEDURE — 96365 THER/PROPH/DIAG IV INF INIT: CPT

## 2025-02-16 PROCEDURE — 36415 COLL VENOUS BLD VENIPUNCTURE: CPT

## 2025-02-16 PROCEDURE — 25810000003 SODIUM CHLORIDE 0.9 % SOLUTION: Performed by: NURSE PRACTITIONER

## 2025-02-16 PROCEDURE — 87086 URINE CULTURE/COLONY COUNT: CPT | Performed by: NURSE PRACTITIONER

## 2025-02-16 PROCEDURE — 84702 CHORIONIC GONADOTROPIN TEST: CPT | Performed by: NURSE PRACTITIONER

## 2025-02-16 PROCEDURE — 99284 EMERGENCY DEPT VISIT MOD MDM: CPT

## 2025-02-16 PROCEDURE — 86901 BLOOD TYPING SEROLOGIC RH(D): CPT | Performed by: NURSE PRACTITIONER

## 2025-02-16 PROCEDURE — 86850 RBC ANTIBODY SCREEN: CPT | Performed by: NURSE PRACTITIONER

## 2025-02-16 PROCEDURE — 81001 URINALYSIS AUTO W/SCOPE: CPT | Performed by: NURSE PRACTITIONER

## 2025-02-16 PROCEDURE — 88305 TISSUE EXAM BY PATHOLOGIST: CPT | Performed by: NURSE PRACTITIONER

## 2025-02-16 PROCEDURE — 80053 COMPREHEN METABOLIC PANEL: CPT | Performed by: NURSE PRACTITIONER

## 2025-02-16 PROCEDURE — 25010000002 METHYLERGONOVINE MALEATE PER 0.2 MG: Performed by: EMERGENCY MEDICINE

## 2025-02-16 RX ORDER — SODIUM CHLORIDE 0.9 % (FLUSH) 0.9 %
10 SYRINGE (ML) INJECTION AS NEEDED
Status: DISCONTINUED | OUTPATIENT
Start: 2025-02-16 | End: 2025-02-16 | Stop reason: HOSPADM

## 2025-02-16 RX ORDER — ACETAMINOPHEN 500 MG
1000 TABLET ORAL ONCE
Status: COMPLETED | OUTPATIENT
Start: 2025-02-16 | End: 2025-02-16

## 2025-02-16 RX ORDER — MISOPROSTOL 200 UG/1
600 TABLET ORAL EVERY 6 HOURS
Qty: 12 TABLET | Refills: 0 | Status: SHIPPED | OUTPATIENT
Start: 2025-02-16 | End: 2025-02-17

## 2025-02-16 RX ORDER — METHYLERGONOVINE MALEATE 0.2 MG/ML
200 INJECTION INTRAVENOUS ONCE
Status: COMPLETED | OUTPATIENT
Start: 2025-02-16 | End: 2025-02-16

## 2025-02-16 RX ORDER — CEFDINIR 300 MG/1
300 CAPSULE ORAL 2 TIMES DAILY
Qty: 14 CAPSULE | Refills: 0 | Status: SHIPPED | OUTPATIENT
Start: 2025-02-16 | End: 2025-02-23

## 2025-02-16 RX ORDER — HYDROCODONE BITARTRATE AND ACETAMINOPHEN 7.5; 325 MG/1; MG/1
1 TABLET ORAL EVERY 6 HOURS PRN
Qty: 12 TABLET | Refills: 0 | Status: SHIPPED | OUTPATIENT
Start: 2025-02-16

## 2025-02-16 RX ADMIN — SODIUM CHLORIDE 1000 MG: 900 INJECTION INTRAVENOUS at 12:13

## 2025-02-16 RX ADMIN — SODIUM CHLORIDE 1000 ML: 9 INJECTION, SOLUTION INTRAVENOUS at 09:47

## 2025-02-16 RX ADMIN — METHYLERGONOVINE MALEATE 200 MCG: 0.2 INJECTION INTRAVENOUS at 12:16

## 2025-02-16 RX ADMIN — ACETAMINOPHEN 1000 MG: 500 TABLET, FILM COATED ORAL at 09:47

## 2025-02-16 NOTE — Clinical Note
[facility] EMERGENCY DEPARTMENT  [address]  [facility] [address]  Phone: [phone]    Randy Meng was seen and treated in our emergency department on 2/16/2025.  She may return to work on 02/20/2025.         Thank you for choosing [facility].    Adalgisa Minor APRN

## 2025-02-16 NOTE — ED PROVIDER NOTES
"Subjective   History of Present Illness  24-year-old female patient presents the ED accompanied by mother with complaints of vaginal bleeding in pregnancy.  Patient is followed by Dr. Maverick Vyas at [facility].  She is 11 weeks pregnant.  G2, P1.   She states she began \"spotting\" yesterday, and today she began \"heavy bleeding\" with \"big clots\" and lower abdominal \"cramping\" \"every now and then\".  She reports nausea; denies vomiting and diarrhea.  She denies fever and chills.  She states that she was \"dizzy\" earlier today, but none now.  She denies any chest pain or shortness of breath.  No head or neck pain.  She had a confirmed IUP on  with a heart rate of 156 and normal-appearing ovaries and cervix.  She denies any injury or falls.  Denies any recent illness.  No dysuria or vaginal discharge.  She denies any abdominal surgical history other than a .  Current medications include as needed hydroxyzine and prenatal vitamins.     ED Triage Vitals [25 0922]   Temp Heart Rate Resp BP SpO2   98.4 °F (36.9 °C) 103 18 109/67 97 %      Temp src Heart Rate Source Patient Position BP Location FiO2 (%)   Oral Monitor Sitting -- --         Review of Systems   Gastrointestinal:  Positive for abdominal pain and nausea.   Genitourinary:  Positive for vaginal bleeding.       History reviewed. No pertinent past medical history.    No Known Allergies    Past Surgical History:   Procedure Laterality Date     SECTION N/A 2024    Procedure:  SECTION PRIMARY;  Surgeon: Chon Granados MD;  Location: [facility] LABOR DELIVERY;  Service: Obstetrics/Gynecology;  Laterality: N/A;       History reviewed. No pertinent family history.    Social History     Socioeconomic History    Marital status: Single   Tobacco Use    Smoking status: Never     Passive exposure: Never    Smokeless tobacco: Never   Vaping Use    Vaping status: Never Used   Substance and Sexual Activity    Alcohol use: " Not Currently    Drug use: Not Currently    Sexual activity: Yes     Partners: Male           Objective   Physical Exam  Vitals and nursing note reviewed. Exam conducted with a chaperone present.   Constitutional:       General: She is awake. She is not in acute distress.     Appearance: She is not ill-appearing, toxic-appearing or diaphoretic.   HENT:      Head: Normocephalic and atraumatic.      Right Ear: External ear normal.      Left Ear: External ear normal.      Nose: Nose normal.      Mouth/Throat:      Mouth: Mucous membranes are moist.   Eyes:      Extraocular Movements: Extraocular movements intact.      Conjunctiva/sclera: Conjunctivae normal.   Cardiovascular:      Rate and Rhythm: Regular rhythm. Tachycardia present.      Pulses: Normal pulses.      Heart sounds: Normal heart sounds. No murmur heard.  Pulmonary:      Effort: Pulmonary effort is normal. No respiratory distress.      Breath sounds: Normal breath sounds. No stridor.   Chest:      Chest wall: No tenderness.   Abdominal:      General: Bowel sounds are normal. There is no distension.      Palpations: Abdomen is soft.      Tenderness: There is no abdominal tenderness. There is no right CVA tenderness, left CVA tenderness or guarding.   Genitourinary:     Exam position: Supine and lithotomy position.      Adnexa:         Right: No tenderness.          Left: No tenderness.        Comments:  exam performed following Ultrasound. Normal external anatomy. Unable to visualize any intravaginal anatomy due to bleeding. Patient passed a large clot and probable products of conception with insertion of speculum.   Musculoskeletal:      Cervical back: Normal range of motion. No rigidity.      Comments: Moves all extremities independently and equally.    Skin:     General: Skin is warm and dry.      Capillary Refill: Capillary refill takes less than 2 seconds.   Neurological:      General: No focal deficit present.      Mental Status: She is alert and  oriented to person, place, and time.   Psychiatric:         Mood and Affect: Mood normal.         Behavior: Behavior normal. Behavior is cooperative.         Procedures       Lab Results (last 24 hours)       Procedure Component Value Units Date/Time    CBC & Differential [[id number]]  (Abnormal) Collected: 02/16/25 0938    Specimen: Blood from Arm, Left Updated: 02/16/25 0950    Narrative:      The following orders were created for panel order CBC & Differential.  Procedure                               Abnormality         Status                     ---------                               -----------         ------                     CBC Auto Differential[[id number]]        Abnormal            Final result                 Please view results for these tests on the individual orders.    Comprehensive Metabolic Panel [[id number]]  (Abnormal) Collected: 02/16/25 0938    Specimen: Blood from Arm, Left Updated: 02/16/25 1006     Glucose 117 mg/dL      BUN 4 mg/dL      Creatinine 0.51 mg/dL      Sodium 133 mmol/L      Potassium 3.7 mmol/L      Chloride 97 mmol/L      CO2 24.0 mmol/L      Calcium 9.0 mg/dL      Total Protein 7.3 g/dL      Albumin 4.1 g/dL      ALT (SGPT) 7 U/L      AST (SGOT) 14 U/L      Alkaline Phosphatase 81 U/L      Total Bilirubin 0.3 mg/dL      Globulin 3.2 gm/dL      A/G Ratio 1.3 g/dL      BUN/Creatinine Ratio 7.8     Anion Gap 12.0 mmol/L      eGFR 134.7 mL/min/1.73     Narrative:      GFR Categories in Chronic Kidney Disease (CKD)      GFR Category          GFR (mL/min/1.73)    Interpretation  G1                     90 or greater         Normal or high (1)  G2                      60-89                Mild decrease (1)  G3a                   45-59                Mild to moderate decrease  G3b                   30-44                Moderate to severe decrease  G4                    15-29                Severe decrease  G5                    14 or less           Kidney failure          (1)In the  absence of evidence of kidney disease, neither GFR category G1 or G2 fulfill the criteria for CKD.    eGFR calculation 2021 CKD-EPI creatinine equation, which does not include race as a factor    hCG, Quantitative, Pregnancy [[id number]] Collected: 02/16/25 0938    Specimen: Blood from Arm, Left Updated: 02/16/25 1034     HCG Quantitative 72,833.00 mIU/mL     Narrative:      HCG Ranges by Gestational Age    Females - non-pregnant premenopausal   </= 1mIU/mL HCG  Females - postmenopausal               </= 7mIU/mL HCG    3 Weeks         5.8 -    71.2 mIU/mL  4 Weeks         9.5 -     750 mIU/mL  5 Weeks         217 -   7,138 mIU/mL  6 Weeks         158 -  31,795 mIU/mL  7 Weeks       3,697 - 163,563 mIU/mL  8 Weeks      32,065 - 149,571 mIU/mL  9 Weeks      63,803 - 151,410 mIU/mL  10 Weeks     46,509 - 186,977 mIU/mL  12 Weeks     27,832 - 210,612 mIU/mL  14 Weeks     13,950 -  62,530 mIU/mL  15 Weeks     12,039 -  70,971 mIU/mL  16 Weeks      9,040 -  56,451 mIU/mL  17 Weeks      8,175 -  55,868 mIU/mL  18 Weeks      8,099 -  58,176 mIU/mL    CBC Auto Differential [[id number]]  (Abnormal) Collected: 02/16/25 0938    Specimen: Blood from Arm, Left Updated: 02/16/25 0950     WBC 20.50 10*3/mm3      RBC 4.63 10*6/mm3      Hemoglobin 12.6 g/dL      Hematocrit 38.2 %      MCV 82.5 fL      MCH 27.2 pg      MCHC 33.0 g/dL      RDW 12.1 %      RDW-SD 36.7 fl      MPV 10.3 fL      Platelets 265 10*3/mm3      Neutrophil % 83.6 %      Lymphocyte % 11.6 %      Monocyte % 3.9 %      Eosinophil % 0.2 %      Basophil % 0.2 %      Immature Grans % 0.5 %      Neutrophils, Absolute 17.13 10*3/mm3      Lymphocytes, Absolute 2.38 10*3/mm3      Monocytes, Absolute 0.80 10*3/mm3      Eosinophils, Absolute 0.04 10*3/mm3      Basophils, Absolute 0.05 10*3/mm3      Immature Grans, Absolute 0.10 10*3/mm3      nRBC 0.0 /100 WBC     Urinalysis With Culture If Indicated - Urine, Clean Catch [[id number]]  (Abnormal) Collected: 02/16/25 0949     Specimen: Urine, Clean Catch Updated: 02/16/25 1002     Color, UA Red     Appearance, UA Turbid     pH, UA 8.5     Specific Gravity, UA 1.010     Glucose,  mg/dL (Trace)     Ketones, UA 40 mg/dL (2+)     Bilirubin, UA Large (3+)     Blood, UA Large (3+)     Protein, UA >=300 mg/dL (3+)     Leuk Esterase, UA Large (3+)     Nitrite, UA Positive     Urobilinogen, UA >=8.0 E.U./dL    Narrative:      Results may be affected by urine specimen color.  In absence of clinical symptoms, the presence of pyuria, bacteria, and/or nitrites on the urinalysis result does not correlate with infection.    Urine Culture - Urine, Urine, Clean Catch [[id number]] Collected: 02/16/25 0949    Specimen: Urine, Clean Catch Updated: 02/16/25 1000    Urinalysis, Microscopic Only - Urine, Clean Catch [[id number]]  (Abnormal) Collected: 02/16/25 0949    Specimen: Urine, Clean Catch Updated: 02/16/25 1006     RBC, UA Too Numerous to Count /HPF      WBC, UA 0-2 /HPF      Bacteria, UA None Seen /HPF      Squamous Epithelial Cells, UA 0-2 /HPF      Hyaline Casts, UA None Seen /LPF      Methodology Manual Light Microscopy    Blood Culture - Blood, Arm, Right [[id number]] Collected: 02/16/25 1144    Specimen: Blood from Arm, Right Updated: 02/16/25 1200    Blood Culture - Blood, Arm, Left [[id number]] Collected: 02/16/25 1205    Specimen: Blood from Arm, Left Updated: 02/16/25 1245    Lactic Acid, Plasma [[id number]]  (Normal) Collected: 02/16/25 1205    Specimen: Blood Updated: 02/16/25 1326     Lactate 1.1 mmol/L     Wet Prep, Genital - Swab, Vagina [[id number]]  (Abnormal) Collected: 02/16/25 1400    Specimen: Swab from Vagina Updated: 02/16/25 1410     YEAST No yeast seen     HYPHAL ELEMENTS No Hyphal elements seen     WBC'S 2+ WBC's seen     Clue Cells, Wet Prep No Clue cells seen     Trichomonas, Wet Prep No Trichomonas seen                ED Course  ED Course as of 02/17/25 0214   Sun Feb 16, 2025   0944 IUP confirmed via US on 02/05/25. ED  attending consulted. Plan: OB US ordered.  [TD]   1120 Patient is  2 para 1 this is her second pregnancy came into the ED with vaginal bleeding her sonogram is negative for intrauterine pregnancy white Sugai and 20,000 and she is having lower abdominal pain there is no clinical history of trauma to the lower abdomen.  No obvious evidence of UTI but has nitrite positive will call OB/GYN over here her OB/GYN is at [facility]. [TS]   1120 Urinalysis With Culture If Indicated - Urine, Clean Catch(!)  3+ blood, TNTC RBCs, 3+ leukocytes, positive nitrite, 0-2 WBCs, no bacteria seen. Rocephin ordered IV.  [TD]   1120 CBC & Differential(!)  20.50, elevated. Neutrophilia noted. RBC, H/H normal. Platelet count normal. BC x2 ordered, lactic ordered.  [TD]   1120 Comprehensive Metabolic Panel(!)  Glucose 117, mildly elevated. BUN 4, Cr 0.51, . Sodium 133, mildly low. Chloride 97, low.  [TD]   1121 Pelvic exam at bedside. Unable to visualize any anatomical structures due to bleeding, even with swabs. Patient passed a large clot and larger than a golf ball, less than a tennis ball probable products of conception.  [TD]   1121 US Ob < 14 Weeks Single or First Gestation  IMPRESSION:  1. No intrauterine gestational sac or evidence of IUP. The endometrium  is thickened up to 3.3 cm. This is probably related to blood products.  2. There is an amorphous 3.3 x 5.3 cm hyperechoic mass within the upper  vagina, which may represent blood products associated with spontaneous   in progress.   [TD]   1122 Patient is tachycardic and borderline soft blood pressure.  Pelvic exam is going performed by the nurse practitioner will call gynecology to come and have a look at her. [TS]   1131 Discussed with OB Dr. Crisostomo will watch the patient in the ED and give her some Methergine.  Pelvic examination revealed that the patient has a past prior to conception. [TS]   1134 ED attending (Teresa) consulted. Plan: Methergine has been  "ordered. Will monitor patient in the ED, repeat exam, and contact on-call OB/GYN for ED if acutely bleeding. Otherwise, patient's established OB/GYN at [facility].  [TD]   1137 Will the patient in the ED watch her and see how she does.  And reassess. [TS]   1243 RN states that she received a proper specimen cup to send tissue passed on  exam to lab. Initially placed in a sterile urine cup. Call to lab, tissue pathology exam ordered with CC to patient's OB/GYN (Maverick Vyas).  [TD]   1338 To bedside, patient states that she is still passing blood clots, but her bleeding has decreased.  She states that she just completed treatment for BV and would like to know if this has resolved. Advised will attempt to collect specimen on repeat examination. She denies any other needs or complaints. RN aware of need for repeat pelvic exam.  States will notify when ready. [TD]   1402 Repeat pelvic examination. Patient still bleeding, but mild to moderately. Can see some vaginal tissue. Pink, moist. ED  notified of need to consult Dr. Maverick Vyas with [facility].  [TD]   1420 Consult with Dr. Granados (on-call OB/GYN for Dr. Maverick Vyas). Plan: Patient to call the office in the morning and will need a repeat sonogram tomorrow.  Discharge patient home with Cytotec 600 mcg orally every 6 hours x 4 doses. [TD]      ED Course User Index  [TD] Adalgisa Minor APRN  [TS] Dave Moore MD                                                       Medical Decision Making  24-year-old female patient presents the ED accompanied by mother with complaints of vaginal bleeding in pregnancy.  Patient is followed by Dr. Maverick Vyas at [facility].  She is 11 weeks pregnant.  G2, P1.   She states she began \"spotting\" yesterday, and today she began \"heavy bleeding\" with \"big clots\" and lower abdominal \"cramping\" \"every now and then\".  She reports nausea; denies vomiting and diarrhea.  She denies fever and chills.  She states that she was \"dizzy\" " earlier today, but none now.  She denies any chest pain or shortness of breath.  No head or neck pain.  She had a confirmed IUP on  with a heart rate of 156 and normal-appearing ovaries and cervix.  She denies any injury or falls.  Denies any recent illness.  No dysuria or vaginal discharge.  She denies any abdominal surgical history other than a .  Current medications include as needed hydroxyzine and prenatal vitamins.   Based on the patient's symptoms and examination findings, potential differential diagnoses include but are not limited to: threatened miscarriage, complete miscarriage, incomplete miscarriage, vaginal bleeding during pregnancy.        Course of treatment in the ED:  Labs Reviewed  WET PREP, GENITAL - Abnormal; Notable for the following components:     WBC'S                           (*)               All other components within normal limits  COMPREHENSIVE METABOLIC PANEL - Abnormal; Notable for the following components:     Glucose                       117 (*)                BUN                           4 (*)                  Creatinine                    0.51 (*)               Sodium                        133 (*)                Chloride                      97 (*)              All other components within normal limits         Narrative: GFR Categories in Chronic Kidney Disease (CKD)                                      GFR Category          GFR (mL/min/1.73)    Interpretation                  G1                     90 or greater         Normal or high (1)                  G2                      60-89                Mild decrease (1)                  G3a                   45-59                Mild to moderate decrease                  G3b                   30-44                Moderate to severe decrease                  G4                    15-29                Severe decrease                  G5                    14 or less           Kidney failure                                           (1)In the absence of evidence of kidney disease, neither GFR category G1 or G2 fulfill the criteria for CKD.                                    eGFR calculation 2021 CKD-EPI creatinine equation, which does not include race as a factor  URINALYSIS W/ CULTURE IF INDICATED - Abnormal; Notable for the following components:     Color, UA                     Red (*)                Appearance, UA                Turbid (*)               pH, UA                        8.5 (*)                Glucose, UA                     (*)                  Ketones, UA                     (*)                  Bilirubin, UA                 Large (3+) (*)               Blood, UA                     Large (3+) (*)               Protein, UA                     (*)                  Leuk Esterase, UA             Large (3+) (*)               Nitrite, UA                   Positive (*)               Urobilinogen, UA                (*)               All other components within normal limits         Narrative: Results may be affected by urine specimen color.                  In absence of clinical symptoms, the presence of pyuria, bacteria, and/or nitrites on the urinalysis result does not correlate with infection.  CBC WITH AUTO DIFFERENTIAL - Abnormal; Notable for the following components:     WBC                           20.50 (*)               RDW                           12.1 (*)               RDW-SD                        36.7 (*)               Neutrophil %                  83.6 (*)               Lymphocyte %                  11.6 (*)               Monocyte %                    3.9 (*)                Eosinophil %                  0.2 (*)                Neutrophils, Absolute         17.13 (*)               Immature Grans, Absolute      0.10 (*)            All other components within normal limits  URINALYSIS, MICROSCOPIC ONLY - Abnormal; Notable for the following components:     RBC, UA                         (*)                All other components within normal limits  LACTIC ACID, PLASMA - Normal  URINE CULTURE  BLOOD CULTURE  BLOOD CULTURE  HCG, QUANTITATIVE, PREGNANCY         Narrative: HCG Ranges by Gestational Age                                    Females - non-pregnant premenopausal   </= 1mIU/mL HCG                  Females - postmenopausal               </= 7mIU/mL HCG                                    3 Weeks         5.8 -    71.2 mIU/mL                  4 Weeks         9.5 -     750 mIU/mL                  5 Weeks         217 -   7,138 mIU/mL                  6 Weeks         158 -  31,795 mIU/mL                  7 Weeks       3,697 - 163,563 mIU/mL                  8 Weeks      32,065 - 149,571 mIU/mL                  9 Weeks      63,803 - 151,410 mIU/mL                  10 Weeks     46,509 - 186,977 mIU/mL                  12 Weeks     27,832 - 210,612 mIU/mL                  14 Weeks     13,950 -  62,530 mIU/mL                  15 Weeks     12,039 -  70,971 mIU/mL                  16 Weeks      9,040 -  56,451 mIU/mL                  17 Weeks      8,175 -  55,868 mIU/mL                  18 Weeks      8,099 -  58,176 mIU/mL   RHIG EVALUATION  DOSES OF RH IMMUNE GLOBULIN  TISSUE PATHOLOGY EXAM  CBC AND DIFFERENTIAL      Medications  sodium chloride 0.9 % bolus 1,000 mL (0 mL Intravenous Stopped 25 1100)  acetaminophen (TYLENOL) tablet 1,000 mg (1,000 mg Oral Given 25 0947)  cefTRIAXone (ROCEPHIN) 1,000 mg in sodium chloride 0.9 % 100 mL MBP (0 mg Intravenous Stopped 25 1246)  methylergonovine (METHERGINE) injection 200 mcg (200 mcg Intramuscular Given 25 1216)      US Ob < 14 Weeks Single or First Gestation   Final Result    1. No intrauterine gestational sac or evidence of IUP. The endometrium    is thickened up to 3.3 cm. This is probably related to blood products.    2. There is an amorphous 3.3 x 5.3 cm hyperechoic mass within the upper    vagina, which may represent blood products  associated with spontaneous     in progress.         This report was signed and finalized on 2025 11:11 AM by Dr. Mukund Mccoy MD.           The patient is stable for discharge following treatment for early pregnancy loss and an acute urinary tract infection. She received fluid resuscitation, pain management, and antibiotic therapy during her stay. Cramping has improved, and there are no signs of excessive bleeding or infection. She has been educated on the expected course of recovery, including possible mild to moderate cramping, continued vaginal bleeding, and the importance of monitoring for signs of complications such as heavy bleeding, severe pain, fever, or foul-smelling discharge. She has been prescribed antibiotics to complete treatment for her UTI, pain medication for symptom management, and misoprostol to assist with uterine evacuation. She was also provided naloxone for safety in case of opioid-related respiratory depression. The patient has been advised complete pelvic rest - to avoid intercourse, tampon use, and strenuous activity - until cleared by her OB/GYN physician. She is instructed to follow up with her OB/GYN tomorrow for a planned repeat sonogram. She agrees to return to the emergency department if she experiences worsening pain, excessive bleeding (soaking more than two pads per hour), dizziness, fever, or any other concerning symptoms.    Problems Addressed:  Acute UTI (urinary tract infection): acute illness or injury  Bilateral lower abdominal cramping: acute illness or injury  Early pregnancy loss: complicated acute illness or injury    Amount and/or Complexity of Data Reviewed  External Data Reviewed: labs, radiology and notes.  Labs: ordered. Decision-making details documented in ED Course.  Radiology: ordered. Decision-making details documented in ED Course.    Risk  OTC drugs.  Prescription drug management.        Final diagnoses:   Early pregnancy loss   Acute UTI  (urinary tract infection)   Bilateral lower abdominal cramping       ED Disposition  ED Disposition       ED Disposition   Discharge    Condition   Stable    Comment   --               Silva Ruffin MD  [address]  [place] [address] [place] [address] [place] [id number] [phone]    Schedule an appointment as soon as possible for a visit on 2/17/2025      [facility] EMERGENCY DEPARTMENT  [address] [place] [id number] [phone]    As needed, If not improving and sooner if worsening    A medication reconciliation was completed based on the patient's report of medications that have either been discontinued or completed.       Medication List        New Prescriptions      cefdinir 300 MG capsule  Commonly known as: OMNICEF  Take 1 capsule by mouth 2 (Two) Times a Day for 7 days.     HYDROcodone-acetaminophen 7.5-325 MG per tablet  Commonly known as: NORCO  Take 1 tablet by mouth Every 6 (Six) Hours As Needed for Moderate Pain or Severe Pain.     miSOPROStol 200 MCG tablet  Commonly known as: CYTOTEC  Take 3 tablets by mouth Every 6 (Six) Hours for 4 doses.     naloxone 4 MG/0.1ML nasal spray  Commonly known as: NARCAN  Call 911. Don't prime. [place] in 1 nostril for overdose. Repeat in 2-3 minutes in other nostril if no or minimal breathing/responsiveness.            Stop      ibuprofen 800 MG tablet  Commonly known as: ADVIL,MOTRIN     NIFEdipine CC 30 MG 24 hr tablet  Commonly known as: ADALAT CC     oxyCODONE-acetaminophen 5-325 MG per tablet  Commonly known as: Percocet               Where to Get Your Medications        These medications were sent to [id number] - [place], [place] - [address] - [phone]  - [phone] FX  [address], [facility] [address]      Phone: [phone]   cefdinir 300 MG capsule  HYDROcodone-acetaminophen 7.5-325 MG per tablet  miSOPROStol 200 MCG tablet  naloxone 4 MG/0.1ML nasal spray             Adalgisa Minor, APRN  02/17/25 0214

## 2025-02-16 NOTE — DISCHARGE INSTRUCTIONS
It was very nice to meet you. Thank you for allowing us to take care of you today at [facility].     Home to rest.  Activity as tolerated.  Stay well-hydrated with plenty of noncaffeinated fluids.  Continue any current medications as prescribed.  You will continue to have vaginal bleeding; however, if your bleeding is severe or you are passing large clots and if you have severe abdominal, fever, or other worrisome symptoms, return to the ER for repeat evaluation. Otherwise, complete pelvic rest.  Do not insert anything into the vaginal cavity.  No sexual intercourse. Call Dr. Maverick Vyas's  office in the morning.  Advise whomever answers the telephone know that you were in the ER today, Dr. Granados was consulted, and you need a repeat ultrasound tomorrow.  Dr. Granados wants you to take misoprostol (Cytotec) every 6 hours for 4 doses starting today.  Please  this medication from the pharmacy and begin taking when you get home.  An antibiotic (Cefidnir) was also prescribed to treat the urinary tract infection.  Hydrocodone-acetaminophen has been prescribed for pain.  Do not drive or operate machinery while taking this medication as it may cause dizziness or slow your response time.  Return to the ER as needed with any worsening or worrisome symptoms.    Today you were seen in the emergency department for your symptoms. Please understand that an ER evaluation is just the start of your evaluation. We do the best we can, but we are often unable to fully find what is causing your symptoms from this single evaluation.  Because of this, the goal is to determine whether you need to be admitted to the hospital or if it is safe for you to go home and follow-up with your other health care providers such as your primary care provider physicians or a specialist on an outpatient basis.      Like we discussed, I strongly urge that you follow up with your primary care provider. Please call their office to set  up an appointment as soon as possible so that you can be re-evaluated for your symptoms or for any other questions.  I have provided the information needed, including phone number, to call to set up an appointment in these discharge papers.      Educational material has also been provided in the following pages. Please take the time to read through this information for important and helpful information.      Please return to the emergency room within 12-48 hours if you experience symptoms such as the following:   Fever, chills, chest pain or shortness of breath, pain with inspiration/expiration, pain that travels to your arms, neck or back, nausea, vomiting, severe headache, tearing pain in your chest, dizziness, feel as though you are about to pass out, OR if you have any worsening symptoms, or any other concerns.

## 2025-02-17 ENCOUNTER — HOSPITAL ENCOUNTER (OUTPATIENT)
Dept: ULTRASOUND IMAGING | Age: 25
Discharge: HOME | End: 2025-02-17
Payer: MEDICAID

## 2025-02-17 ENCOUNTER — TELEPHONE (OUTPATIENT)
Dept: OBGYN CLINIC | Age: 25
End: 2025-02-17

## 2025-02-17 DIAGNOSIS — O03.9 SAB (SPONTANEOUS ABORTION): ICD-10-CM

## 2025-02-17 DIAGNOSIS — O03.9 SAB (SPONTANEOUS ABORTION): Primary | ICD-10-CM

## 2025-02-17 LAB — BACTERIA SPEC AEROBE CULT: NO GROWTH

## 2025-02-17 PROCEDURE — 76830 TRANSVAGINAL US NON-OB: CPT

## 2025-02-17 NOTE — TELEPHONE ENCOUNTER
Called and spoke with pt at this time regarding US follow up. Pt states that at this time she does not want to do a follow up. Strongly encouraged by provider that some kind of follow up is needed so that we can monitor this issue. Pt states that she is still bleeding but did take her last dose of Cytotec orally and that she has pain medicine in hand. Pt US results explained to her and she voiced understanding that she still has thickening on her US so we need to ensure this is followed up on incase this needs surgery. Pt agreeable. Pt is aware that a HCG order has been placed at this time and can be done in the next 1-2 days along with another US will be scheduled before her apt on Friday. Pt voiced understanding and will receive MyChart once US has been scheduled.

## 2025-02-18 ENCOUNTER — PATIENT MESSAGE (OUTPATIENT)
Dept: OBGYN CLINIC | Age: 25
End: 2025-02-18

## 2025-02-18 LAB
CYTO UR: NORMAL
LAB AP CASE REPORT: NORMAL
Lab: NORMAL
PATH REPORT.FINAL DX SPEC: NORMAL
PATH REPORT.GROSS SPEC: NORMAL

## 2025-02-19 ENCOUNTER — TELEPHONE (OUTPATIENT)
Dept: OBGYN CLINIC | Age: 25
End: 2025-02-19

## 2025-02-20 ENCOUNTER — TELEPHONE (OUTPATIENT)
Dept: OBGYN CLINIC | Age: 25
End: 2025-02-20

## 2025-02-20 NOTE — TELEPHONE ENCOUNTER
Called pt x 2, message says \"unable to receive calls\".  I will respond to her my chart message.

## 2025-02-21 ENCOUNTER — HOSPITAL ENCOUNTER (OUTPATIENT)
Dept: ULTRASOUND IMAGING | Age: 25
Discharge: HOME | End: 2025-02-21
Payer: MEDICAID

## 2025-02-21 ENCOUNTER — OFFICE VISIT (OUTPATIENT)
Dept: OBGYN CLINIC | Age: 25
End: 2025-02-21
Payer: MEDICAID

## 2025-02-21 VITALS
SYSTOLIC BLOOD PRESSURE: 132 MMHG | WEIGHT: 120 LBS | HEART RATE: 73 BPM | DIASTOLIC BLOOD PRESSURE: 83 MMHG | BODY MASS INDEX: 24.24 KG/M2

## 2025-02-21 DIAGNOSIS — Z71.2 ENCOUNTER TO DISCUSS TEST RESULTS: ICD-10-CM

## 2025-02-21 DIAGNOSIS — O03.9 SAB (SPONTANEOUS ABORTION): Primary | ICD-10-CM

## 2025-02-21 DIAGNOSIS — O03.9 SAB (SPONTANEOUS ABORTION): ICD-10-CM

## 2025-02-21 DIAGNOSIS — N93.9 VAGINAL BLEEDING: ICD-10-CM

## 2025-02-21 LAB
BACTERIA SPEC AEROBE CULT: NORMAL
BACTERIA SPEC AEROBE CULT: NORMAL

## 2025-02-21 PROCEDURE — 99213 OFFICE O/P EST LOW 20 MIN: CPT | Performed by: NURSE PRACTITIONER

## 2025-02-21 PROCEDURE — G8427 DOCREV CUR MEDS BY ELIG CLIN: HCPCS | Performed by: NURSE PRACTITIONER

## 2025-02-21 PROCEDURE — 1036F TOBACCO NON-USER: CPT | Performed by: NURSE PRACTITIONER

## 2025-02-21 PROCEDURE — 76830 TRANSVAGINAL US NON-OB: CPT

## 2025-02-21 PROCEDURE — G8420 CALC BMI NORM PARAMETERS: HCPCS | Performed by: NURSE PRACTITIONER

## 2025-02-21 ASSESSMENT — ENCOUNTER SYMPTOMS
EYES NEGATIVE: 1
GASTROINTESTINAL NEGATIVE: 1
ALLERGIC/IMMUNOLOGIC NEGATIVE: 1
RESPIRATORY NEGATIVE: 1

## 2025-02-21 NOTE — PROGRESS NOTES
Fabricio Spivey discharge to home/self care  Hien Chery is a 24 y.o. female who presents today for her medical conditions/ complaints as noted below. Hien Chery is c/o of Follow-up (Pt is here f/u [facility], she is having some bleeding that started around . )        HPI  Pt here to follow up on sab. Bleeding was a little heavy this am, but now more like spotting. US prelim: no YS, IUP or FP. No ovarian cysts, endometrium 1cm.   She is doing ok mentally. Wasn't planning on being pregnant, but still emotional going through this.     Patient's last menstrual period was 2024.      Past Medical History:   Diagnosis Date    Genital herpes      Past Surgical History:   Procedure Laterality Date    EXTERNAL EAR SURGERY Left      Family History   Problem Relation Age of Onset    Other Mother         Irregular menses    Diabetes Maternal Grandmother     Cancer Maternal Grandfather      Social History     Tobacco Use    Smoking status: Never    Smokeless tobacco: Never   Substance Use Topics    Alcohol use: Yes     Comment: Occ       Current Outpatient Medications   Medication Sig Dispense Refill    Prenatal Vit-Fe Fumarate-FA (PRENATAL VITAMIN) 27-0.8 MG TABS Take 1 tablet by mouth daily 30 tablet 11    ondansetron (ZOFRAN-ODT) 4 MG disintegrating tablet Take 1 tablet by mouth 3 times daily as needed for Nausea or Vomiting 21 tablet 3    aspirin (ASPIRIN CHILDRENS) 81 MG chewable tablet Take 1 tablet by mouth daily 30 tablet 11    Multiple Vitamin (MULTIVITAMIN) TABS tablet Take 1 tablet by mouth daily      ondansetron (ZOFRAN-ODT) 4 MG disintegrating tablet Take 1 tablet by mouth 3 times daily as needed for Nausea or Vomiting 21 tablet 0     No current facility-administered medications for this visit.     No Known Allergies  Vitals:    25 1316   BP: 132/83   Pulse: 73     Body mass index is 24.24 kg/m².    Review of Systems   Constitutional: Negative.    HENT: Negative.     Eyes: Negative.    Respiratory: Negative.     Cardiovascular:

## 2025-02-27 ENCOUNTER — PATIENT MESSAGE (OUTPATIENT)
Dept: PRIMARY CARE CLINIC | Age: 25
End: 2025-02-27

## 2025-02-27 DIAGNOSIS — B37.31 VAGINAL YEAST INFECTION: Primary | ICD-10-CM

## 2025-02-28 DIAGNOSIS — B37.31 VAGINAL YEAST INFECTION: Primary | ICD-10-CM

## 2025-02-28 RX ORDER — FLUCONAZOLE 150 MG/1
150 TABLET ORAL DAILY
Qty: 3 TABLET | Refills: 0 | Status: SHIPPED | OUTPATIENT
Start: 2025-02-28 | End: 2025-03-03

## 2025-03-25 ENCOUNTER — TELEPHONE (OUTPATIENT)
Dept: PRIMARY CARE CLINIC | Age: 25
End: 2025-03-25

## 2025-03-25 ENCOUNTER — RESULTS FOLLOW-UP (OUTPATIENT)
Dept: OBGYN CLINIC | Age: 25
End: 2025-03-25

## 2025-03-25 NOTE — TELEPHONE ENCOUNTER
Patient had appointment today and called 45 minutes prior to cancel, states she was having transportation issue. Did inform patient they usually ask for a 3 hour notice for cancellations if able in the future. Patient did reschedule for tommorow 3/26/2025

## 2025-03-26 ENCOUNTER — OFFICE VISIT (OUTPATIENT)
Dept: PRIMARY CARE CLINIC | Age: 25
End: 2025-03-26

## 2025-03-26 VITALS
TEMPERATURE: 97.6 F | BODY MASS INDEX: 24.82 KG/M2 | OXYGEN SATURATION: 98 % | WEIGHT: 123.1 LBS | HEART RATE: 60 BPM | HEIGHT: 59 IN | SYSTOLIC BLOOD PRESSURE: 110 MMHG | DIASTOLIC BLOOD PRESSURE: 70 MMHG

## 2025-03-26 DIAGNOSIS — N89.8 VAGINAL IRRITATION: Primary | ICD-10-CM

## 2025-03-28 ENCOUNTER — RESULTS FOLLOW-UP (OUTPATIENT)
Dept: PRIMARY CARE CLINIC | Age: 25
End: 2025-03-28

## 2025-03-28 DIAGNOSIS — B37.31 VAGINAL YEAST INFECTION: ICD-10-CM

## 2025-03-28 DIAGNOSIS — B96.89 BACTERIAL VAGINITIS: Primary | ICD-10-CM

## 2025-03-28 DIAGNOSIS — N76.0 BACTERIAL VAGINITIS: Primary | ICD-10-CM

## 2025-03-28 DIAGNOSIS — A59.9 TRICHOMONAL INFECTION: ICD-10-CM

## 2025-03-28 RX ORDER — METRONIDAZOLE 500 MG/1
500 TABLET ORAL 2 TIMES DAILY
Qty: 14 TABLET | Refills: 0 | Status: SHIPPED | OUTPATIENT
Start: 2025-03-28 | End: 2025-04-04

## 2025-03-28 RX ORDER — DOXYCYCLINE HYCLATE 100 MG
100 TABLET ORAL 2 TIMES DAILY
Qty: 20 TABLET | Refills: 0 | Status: SHIPPED | OUTPATIENT
Start: 2025-03-28 | End: 2025-04-07

## 2025-03-28 RX ORDER — FLUCONAZOLE 150 MG/1
150 TABLET ORAL DAILY
Qty: 3 TABLET | Refills: 0 | Status: SHIPPED | OUTPATIENT
Start: 2025-03-28 | End: 2025-03-31

## 2025-03-28 ASSESSMENT — ENCOUNTER SYMPTOMS
COLOR CHANGE: 0
CHEST TIGHTNESS: 0
DIARRHEA: 0
SORE THROAT: 0
VOMITING: 0
COUGH: 0
SHORTNESS OF BREATH: 0
ABDOMINAL PAIN: 0
NAUSEA: 0

## 2025-03-28 NOTE — PROGRESS NOTES
Ms.Elayah Chery is a 24 y.o. female who presents today for  Chief Complaint   Patient presents with    Vaginal Itching       HPI:  History of Present Illness  The patient presents for evaluation of vaginal irritation.    Intermittent mild vaginal itching is reported, accompanied by an increase in watery, clear discharge. A history of bacterial vaginosis (BV) is noted, although current symptoms are not believed to be indicative of a sexually transmitted infection, as she has been in a monogamous relationship with the same partner. Preference for topical treatment over oral medication is expressed due to previous adverse reactions to oral medications during pregnancy. No urinary frequency or urgency is experienced. A single episode of right-sided abdominal pain occurred, which has since resolved.       Results         Review of Systems   Constitutional:  Negative for activity change and fever.   HENT:  Negative for congestion, ear pain and sore throat.    Respiratory:  Negative for cough, chest tightness and shortness of breath.    Cardiovascular:  Negative for chest pain.   Gastrointestinal:  Negative for abdominal pain, diarrhea, nausea and vomiting.   Genitourinary:  Positive for vaginal discharge. Negative for frequency and urgency.   Musculoskeletal:  Negative for arthralgias and myalgias.   Skin:  Negative for color change.   Neurological:  Negative for dizziness, weakness and numbness.   Psychiatric/Behavioral:  Negative for agitation. The patient is not nervous/anxious.        Past Medical History:   Diagnosis Date    Genital herpes        Current Outpatient Medications   Medication Sig Dispense Refill    Prenatal Vit-Fe Fumarate-FA (PRENATAL VITAMIN) 27-0.8 MG TABS Take 1 tablet by mouth daily 30 tablet 11    ondansetron (ZOFRAN-ODT) 4 MG disintegrating tablet Take 1 tablet by mouth 3 times daily as needed for Nausea or Vomiting (Patient not taking: Reported on 3/26/2025) 21 tablet 3    aspirin (ASPIRIN

## 2025-05-28 ENCOUNTER — PATIENT MESSAGE (OUTPATIENT)
Dept: PRIMARY CARE CLINIC | Age: 25
End: 2025-05-28

## 2025-05-30 ENCOUNTER — TELEMEDICINE (OUTPATIENT)
Dept: PRIMARY CARE CLINIC | Age: 25
End: 2025-05-30

## 2025-05-30 DIAGNOSIS — B00.9 HSV (HERPES SIMPLEX VIRUS) INFECTION: Primary | ICD-10-CM

## 2025-05-30 RX ORDER — VALACYCLOVIR HYDROCHLORIDE 1 G/1
1000 TABLET, FILM COATED ORAL DAILY
Qty: 90 TABLET | Refills: 0 | Status: SHIPPED | OUTPATIENT
Start: 2025-05-30 | End: 2025-08-28

## 2025-05-30 ASSESSMENT — ENCOUNTER SYMPTOMS
DIARRHEA: 0
CHEST TIGHTNESS: 0
COLOR CHANGE: 0
SHORTNESS OF BREATH: 0
COUGH: 0
SORE THROAT: 0
ABDOMINAL PAIN: 0
VOMITING: 0
NAUSEA: 0

## 2025-05-30 NOTE — PROGRESS NOTES
[address] [place] [address] Drive   [address] [facility], [address]     Phone:  [phone]  Fax:  [phone]      2025    TELEHEALTH EVALUATION -- Audio/Visual (During COVID-19 public health emergency)    HPI:    No chief complaint on file.        Hien Chery (:  2000) has requested an audio/video evaluation for the following concern(s):    History of Present Illness  The patient presents via virtual visit for a medication refill.    She is seeking a refill of her valacyclovir prescription, which she uses as a suppressive therapy for herpes. Typically, she administers a 500 mg dose during outbreaks, although she has previously adhered to a daily regimen for a duration of 6 months. She reports that this treatment approach has been effective in managing her condition, with the exception of an outbreak that occurred approximately 3 to 4 days ago.       Review of Systems   Constitutional:  Negative for activity change and fever.   HENT:  Negative for congestion, ear pain and sore throat.    Respiratory:  Negative for cough, chest tightness and shortness of breath.    Cardiovascular:  Negative for chest pain.   Gastrointestinal:  Negative for abdominal pain, diarrhea, nausea and vomiting.   Genitourinary:  Positive for genital sores. Negative for frequency and urgency.   Musculoskeletal:  Negative for arthralgias and myalgias.   Skin:  Negative for color change.   Neurological:  Negative for dizziness, weakness and numbness.   Psychiatric/Behavioral:  Negative for agitation. The patient is not nervous/anxious.        Prior to Visit Medications    Medication Sig Taking? Authorizing Provider   valACYclovir (VALTREX) 1 g tablet Take 1 tablet by mouth daily Yes Yaneth Little, APRN - CNP   Prenatal Vit-Fe Fumarate-FA (PRENATAL VITAMIN) 27-0.8 MG TABS Take 1 tablet by mouth daily  Yris Mg APRN - CNP   ondansetron (ZOFRAN-ODT) 4 MG disintegrating tablet Take 1 tablet by mouth 3 times daily as needed for

## 2025-06-03 ENCOUNTER — TELEPHONE (OUTPATIENT)
Dept: PRIMARY CARE CLINIC | Age: 25
End: 2025-06-03

## 2025-09-05 ENCOUNTER — OFFICE VISIT (OUTPATIENT)
Dept: OBGYN CLINIC | Age: 25
End: 2025-09-05
Payer: MEDICAID

## 2025-09-05 VITALS
SYSTOLIC BLOOD PRESSURE: 133 MMHG | WEIGHT: 128 LBS | BODY MASS INDEX: 25.85 KG/M2 | DIASTOLIC BLOOD PRESSURE: 77 MMHG | HEART RATE: 103 BPM

## 2025-09-05 DIAGNOSIS — R30.0 DYSURIA: ICD-10-CM

## 2025-09-05 DIAGNOSIS — N90.89 VULVAR IRRITATION: Primary | ICD-10-CM

## 2025-09-05 PROCEDURE — 1036F TOBACCO NON-USER: CPT | Performed by: NURSE PRACTITIONER

## 2025-09-05 PROCEDURE — G8427 DOCREV CUR MEDS BY ELIG CLIN: HCPCS | Performed by: NURSE PRACTITIONER

## 2025-09-05 PROCEDURE — 99213 OFFICE O/P EST LOW 20 MIN: CPT | Performed by: NURSE PRACTITIONER

## 2025-09-05 PROCEDURE — G8419 CALC BMI OUT NRM PARAM NOF/U: HCPCS | Performed by: NURSE PRACTITIONER

## 2025-09-05 ASSESSMENT — ENCOUNTER SYMPTOMS
RESPIRATORY NEGATIVE: 1
GASTROINTESTINAL NEGATIVE: 1
EYES NEGATIVE: 1
ALLERGIC/IMMUNOLOGIC NEGATIVE: 1

## (undated) DEVICE — Device

## (undated) DEVICE — SUT GUT CHRM 1 CTX 36IN 905H

## (undated) DEVICE — GOWN SURG PREVENTION PLUS IMPERV XL

## (undated) DEVICE — SUT VIC PLS 0 CTX 36IN UD VCP978H

## (undated) DEVICE — GLV SURG SENSICARE SLT PF LF 7.5 STRL

## (undated) DEVICE — BNDG CLSR STERISTRIP 1/2X4IN 6/PK

## (undated) DEVICE — STPLR SKIN SUBCUTICULAR INSORB 2030

## (undated) DEVICE — ADHS LIQ MASTISOL 2/3ML

## (undated) DEVICE — PK C/SECT 30

## (undated) DEVICE — APPL CHLORAPREP HI/LITE 26ML ORNG